# Patient Record
Sex: MALE | Race: WHITE | Employment: OTHER | URBAN - NONMETROPOLITAN AREA
[De-identification: names, ages, dates, MRNs, and addresses within clinical notes are randomized per-mention and may not be internally consistent; named-entity substitution may affect disease eponyms.]

---

## 2017-01-03 ENCOUNTER — TELEPHONE (OUTPATIENT)
Dept: NEUROLOGY | Age: 67
End: 2017-01-03

## 2017-01-05 ENCOUNTER — OFFICE VISIT (OUTPATIENT)
Dept: FAMILY MEDICINE CLINIC | Age: 67
End: 2017-01-05
Payer: COMMERCIAL

## 2017-01-05 VITALS
HEART RATE: 66 BPM | BODY MASS INDEX: 35.75 KG/M2 | OXYGEN SATURATION: 98 % | SYSTOLIC BLOOD PRESSURE: 134 MMHG | WEIGHT: 286 LBS | DIASTOLIC BLOOD PRESSURE: 80 MMHG | TEMPERATURE: 98.1 F

## 2017-01-05 DIAGNOSIS — G60.9 IDIOPATHIC PERIPHERAL NEUROPATHY: ICD-10-CM

## 2017-01-05 DIAGNOSIS — B35.3 TINEA PEDIS OF LEFT FOOT: ICD-10-CM

## 2017-01-05 DIAGNOSIS — M10.9 GOUT, UNSPECIFIED CAUSE, UNSPECIFIED CHRONICITY, UNSPECIFIED SITE: ICD-10-CM

## 2017-01-05 DIAGNOSIS — N40.0 BENIGN NODULAR PROSTATIC HYPERPLASIA WITHOUT LOWER URINARY TRACT SYMPTOMS: ICD-10-CM

## 2017-01-05 DIAGNOSIS — L97.512 CHRONIC ULCER OF RIGHT GREAT TOE, WITH FAT LAYER EXPOSED (HCC): ICD-10-CM

## 2017-01-05 DIAGNOSIS — L97.519: ICD-10-CM

## 2017-01-05 DIAGNOSIS — I73.9 PERIPHERAL VASCULAR DISEASE (HCC): ICD-10-CM

## 2017-01-05 DIAGNOSIS — B35.1 ONYCHOMYCOSIS: ICD-10-CM

## 2017-01-05 PROCEDURE — 99214 OFFICE O/P EST MOD 30 MIN: CPT | Performed by: FAMILY MEDICINE

## 2017-01-05 RX ORDER — MINOCYCLINE HYDROCHLORIDE 100 MG/1
100 TABLET ORAL 2 TIMES DAILY
Qty: 60 TABLET | Refills: 0 | Status: SHIPPED | OUTPATIENT
Start: 2017-01-05 | End: 2017-02-04

## 2017-01-05 ASSESSMENT — ENCOUNTER SYMPTOMS
WHEEZING: 0
SHORTNESS OF BREATH: 0
CONSTIPATION: 0
DIARRHEA: 0
ABDOMINAL PAIN: 0
BLOOD IN STOOL: 0
VOMITING: 0
NAUSEA: 0
CHEST TIGHTNESS: 0

## 2017-02-06 ENCOUNTER — OFFICE VISIT (OUTPATIENT)
Dept: FAMILY MEDICINE CLINIC | Age: 67
End: 2017-02-06
Payer: COMMERCIAL

## 2017-02-06 VITALS
TEMPERATURE: 98.6 F | RESPIRATION RATE: 20 BRPM | DIASTOLIC BLOOD PRESSURE: 86 MMHG | OXYGEN SATURATION: 98 % | SYSTOLIC BLOOD PRESSURE: 138 MMHG | WEIGHT: 288 LBS | HEART RATE: 72 BPM | HEIGHT: 75 IN | BODY MASS INDEX: 35.81 KG/M2

## 2017-02-06 DIAGNOSIS — M10.9 GOUT, UNSPECIFIED CAUSE, UNSPECIFIED CHRONICITY, UNSPECIFIED SITE: ICD-10-CM

## 2017-02-06 DIAGNOSIS — L97.512 CHRONIC ULCER OF RIGHT GREAT TOE, WITH FAT LAYER EXPOSED (HCC): ICD-10-CM

## 2017-02-06 DIAGNOSIS — B35.3 TINEA PEDIS OF LEFT FOOT: ICD-10-CM

## 2017-02-06 DIAGNOSIS — G60.9 IDIOPATHIC PERIPHERAL NEUROPATHY: ICD-10-CM

## 2017-02-06 DIAGNOSIS — G62.9 NEUROPATHY: ICD-10-CM

## 2017-02-06 DIAGNOSIS — I73.9 PERIPHERAL VASCULAR DISEASE (HCC): ICD-10-CM

## 2017-02-06 PROCEDURE — 99214 OFFICE O/P EST MOD 30 MIN: CPT | Performed by: FAMILY MEDICINE

## 2017-02-06 ASSESSMENT — ENCOUNTER SYMPTOMS
DIARRHEA: 0
CONSTIPATION: 0
EYES NEGATIVE: 1
ALLERGIC/IMMUNOLOGIC NEGATIVE: 1
VOMITING: 0
CHEST TIGHTNESS: 0
BLOOD IN STOOL: 0
NAUSEA: 0
SHORTNESS OF BREATH: 0
WHEEZING: 0

## 2017-03-30 ENCOUNTER — OFFICE VISIT (OUTPATIENT)
Dept: FAMILY MEDICINE CLINIC | Age: 67
End: 2017-03-30
Payer: COMMERCIAL

## 2017-03-30 VITALS
HEART RATE: 104 BPM | DIASTOLIC BLOOD PRESSURE: 74 MMHG | BODY MASS INDEX: 34.82 KG/M2 | RESPIRATION RATE: 16 BRPM | WEIGHT: 280 LBS | SYSTOLIC BLOOD PRESSURE: 130 MMHG | HEIGHT: 75 IN | TEMPERATURE: 98.1 F | OXYGEN SATURATION: 98 %

## 2017-03-30 DIAGNOSIS — M10.9 GOUT, UNSPECIFIED CAUSE, UNSPECIFIED CHRONICITY, UNSPECIFIED SITE: ICD-10-CM

## 2017-03-30 DIAGNOSIS — G60.9 IDIOPATHIC PERIPHERAL NEUROPATHY: ICD-10-CM

## 2017-03-30 DIAGNOSIS — B35.1 ONYCHOMYCOSIS: ICD-10-CM

## 2017-03-30 DIAGNOSIS — L03.031 CELLULITIS OF TOE OF RIGHT FOOT: ICD-10-CM

## 2017-03-30 DIAGNOSIS — G62.9 NEUROPATHY: ICD-10-CM

## 2017-03-30 DIAGNOSIS — I73.9 PERIPHERAL VASCULAR DISEASE (HCC): ICD-10-CM

## 2017-03-30 DIAGNOSIS — F41.9 ANXIETY: ICD-10-CM

## 2017-03-30 DIAGNOSIS — L97.512 CHRONIC ULCER OF RIGHT GREAT TOE, WITH FAT LAYER EXPOSED (HCC): ICD-10-CM

## 2017-03-30 DIAGNOSIS — E78.2 MIXED HYPERLIPIDEMIA: ICD-10-CM

## 2017-03-30 DIAGNOSIS — E29.1 ANDROGEN DEFICIENCY: ICD-10-CM

## 2017-03-30 PROCEDURE — 99214 OFFICE O/P EST MOD 30 MIN: CPT | Performed by: FAMILY MEDICINE

## 2017-03-30 PROCEDURE — 96372 THER/PROPH/DIAG INJ SC/IM: CPT | Performed by: FAMILY MEDICINE

## 2017-03-30 RX ORDER — DOXYCYCLINE HYCLATE 100 MG/1
100 CAPSULE ORAL 2 TIMES DAILY
Qty: 60 CAPSULE | Refills: 0 | Status: SHIPPED | OUTPATIENT
Start: 2017-03-30 | End: 2017-04-14

## 2017-03-30 RX ORDER — PENTOXIFYLLINE 400 MG/1
400 TABLET, EXTENDED RELEASE ORAL
Qty: 90 TABLET | Refills: 3 | Status: SHIPPED | OUTPATIENT
Start: 2017-03-30 | End: 2017-06-02 | Stop reason: SDUPTHER

## 2017-03-30 RX ORDER — SULFAMETHOXAZOLE AND TRIMETHOPRIM 800; 160 MG/1; MG/1
1 TABLET ORAL 2 TIMES DAILY
Qty: 60 TABLET | Refills: 0 | Status: SHIPPED | OUTPATIENT
Start: 2017-03-30 | End: 2017-04-14

## 2017-03-30 RX ORDER — CEFTRIAXONE 500 MG/1
1000 INJECTION, POWDER, FOR SOLUTION INTRAMUSCULAR; INTRAVENOUS ONCE
Status: COMPLETED | OUTPATIENT
Start: 2017-03-30 | End: 2017-03-30

## 2017-03-30 RX ADMIN — CEFTRIAXONE 1000 MG: 500 INJECTION, POWDER, FOR SOLUTION INTRAMUSCULAR; INTRAVENOUS at 11:33

## 2017-03-30 ASSESSMENT — ENCOUNTER SYMPTOMS
CHEST TIGHTNESS: 0
BLOOD IN STOOL: 0
DIARRHEA: 0
SHORTNESS OF BREATH: 0
NAUSEA: 0
CONSTIPATION: 0
VOMITING: 0
WHEEZING: 0

## 2017-04-17 DIAGNOSIS — F43.10 PTSD (POST-TRAUMATIC STRESS DISORDER): ICD-10-CM

## 2017-04-17 RX ORDER — DULOXETIN HYDROCHLORIDE 60 MG/1
60 CAPSULE, DELAYED RELEASE ORAL DAILY
Qty: 90 CAPSULE | Refills: 3 | Status: SHIPPED | OUTPATIENT
Start: 2017-04-17 | End: 2018-04-16 | Stop reason: SDUPTHER

## 2017-04-20 ENCOUNTER — OFFICE VISIT (OUTPATIENT)
Dept: FAMILY MEDICINE CLINIC | Age: 67
End: 2017-04-20
Payer: COMMERCIAL

## 2017-04-20 VITALS
HEIGHT: 75 IN | SYSTOLIC BLOOD PRESSURE: 122 MMHG | RESPIRATION RATE: 16 BRPM | BODY MASS INDEX: 34.32 KG/M2 | TEMPERATURE: 98.8 F | DIASTOLIC BLOOD PRESSURE: 80 MMHG | HEART RATE: 72 BPM | WEIGHT: 276 LBS | OXYGEN SATURATION: 98 %

## 2017-04-20 DIAGNOSIS — F41.9 ANXIETY: ICD-10-CM

## 2017-04-20 DIAGNOSIS — E78.2 MIXED HYPERLIPIDEMIA: ICD-10-CM

## 2017-04-20 DIAGNOSIS — I73.9 PERIPHERAL VASCULAR DISEASE (HCC): ICD-10-CM

## 2017-04-20 DIAGNOSIS — L03.031 CELLULITIS OF TOE OF RIGHT FOOT: ICD-10-CM

## 2017-04-20 DIAGNOSIS — G60.9 IDIOPATHIC PERIPHERAL NEUROPATHY: ICD-10-CM

## 2017-04-20 DIAGNOSIS — G62.9 NEUROPATHY: ICD-10-CM

## 2017-04-20 DIAGNOSIS — F32.89 OTHER DEPRESSION: ICD-10-CM

## 2017-04-20 DIAGNOSIS — B35.3 TINEA PEDIS OF LEFT FOOT: ICD-10-CM

## 2017-04-20 PROCEDURE — 99214 OFFICE O/P EST MOD 30 MIN: CPT | Performed by: FAMILY MEDICINE

## 2017-04-20 ASSESSMENT — ENCOUNTER SYMPTOMS
EYES NEGATIVE: 1
DIARRHEA: 0
BLOOD IN STOOL: 0
SHORTNESS OF BREATH: 0
VOMITING: 0
CONSTIPATION: 0
CHEST TIGHTNESS: 0
WHEEZING: 0
NAUSEA: 0
ALLERGIC/IMMUNOLOGIC NEGATIVE: 1

## 2017-06-02 RX ORDER — PENTOXIFYLLINE 400 MG/1
400 TABLET, EXTENDED RELEASE ORAL
Qty: 270 TABLET | Refills: 3 | Status: SHIPPED | OUTPATIENT
Start: 2017-06-02 | End: 2018-06-01 | Stop reason: SDUPTHER

## 2017-08-29 DIAGNOSIS — E34.9 TESTOSTERONE DEFICIENCY: ICD-10-CM

## 2017-08-31 RX ORDER — TESTOSTERONE CYPIONATE 200 MG/ML
INJECTION INTRAMUSCULAR
Qty: 10 ML | Refills: 0 | Status: SHIPPED | OUTPATIENT
Start: 2017-08-31 | End: 2019-02-21

## 2017-10-03 ENCOUNTER — OFFICE VISIT (OUTPATIENT)
Dept: FAMILY MEDICINE CLINIC | Age: 67
End: 2017-10-03
Payer: COMMERCIAL

## 2017-10-03 VITALS
WEIGHT: 275 LBS | SYSTOLIC BLOOD PRESSURE: 138 MMHG | HEART RATE: 87 BPM | OXYGEN SATURATION: 97 % | BODY MASS INDEX: 34.19 KG/M2 | DIASTOLIC BLOOD PRESSURE: 86 MMHG | HEIGHT: 75 IN | RESPIRATION RATE: 14 BRPM | TEMPERATURE: 99.1 F

## 2017-10-03 DIAGNOSIS — Z00.00 ANNUAL PHYSICAL EXAM: ICD-10-CM

## 2017-10-03 DIAGNOSIS — L97.519: ICD-10-CM

## 2017-10-03 DIAGNOSIS — I73.9 PERIPHERAL VASCULAR DISEASE (HCC): ICD-10-CM

## 2017-10-03 DIAGNOSIS — E34.9 TESTOSTERONE DEFICIENCY: ICD-10-CM

## 2017-10-03 DIAGNOSIS — E78.2 MIXED HYPERLIPIDEMIA: ICD-10-CM

## 2017-10-03 DIAGNOSIS — G60.9 IDIOPATHIC PERIPHERAL NEUROPATHY: ICD-10-CM

## 2017-10-03 DIAGNOSIS — Z97.4 HEARING AID WORN: ICD-10-CM

## 2017-10-03 LAB
ALBUMIN SERPL-MCNC: 4.4 G/DL (ref 3.5–5.2)
ALP BLD-CCNC: 63 U/L (ref 40–130)
ALT SERPL-CCNC: 22 U/L (ref 5–41)
ANION GAP SERPL CALCULATED.3IONS-SCNC: 12 MMOL/L (ref 7–19)
AST SERPL-CCNC: 16 U/L (ref 5–40)
BILIRUB SERPL-MCNC: 0.6 MG/DL (ref 0.2–1.2)
BILIRUBIN DIRECT: 0.1 MG/DL (ref 0–0.3)
BILIRUBIN, INDIRECT: 0.5 MG/DL (ref 0.1–1)
BUN BLDV-MCNC: 16 MG/DL (ref 8–23)
CALCIUM SERPL-MCNC: 9.4 MG/DL (ref 8.8–10.2)
CHLORIDE BLD-SCNC: 107 MMOL/L (ref 98–111)
CO2: 25 MMOL/L (ref 22–29)
CREAT SERPL-MCNC: 0.9 MG/DL (ref 0.5–1.2)
GFR NON-AFRICAN AMERICAN: >60
GLUCOSE BLD-MCNC: 115 MG/DL (ref 74–109)
HCT VFR BLD CALC: 40.9 % (ref 42–52)
HEMOGLOBIN: 14 G/DL (ref 14–18)
MCH RBC QN AUTO: 32.9 PG (ref 27–31)
MCHC RBC AUTO-ENTMCNC: 34.2 G/DL (ref 33–37)
MCV RBC AUTO: 96.2 FL (ref 80–94)
PDW BLD-RTO: 13.8 % (ref 11.5–14.5)
PLATELET # BLD: 181 K/UL (ref 130–400)
PMV BLD AUTO: 10.2 FL (ref 9.4–12.4)
POTASSIUM SERPL-SCNC: 4.4 MMOL/L (ref 3.5–5)
RBC # BLD: 4.25 M/UL (ref 4.7–6.1)
SODIUM BLD-SCNC: 144 MMOL/L (ref 136–145)
T4 FREE: 1 NG/DL (ref 0.9–1.7)
TOTAL PROTEIN: 7.3 G/DL (ref 6.6–8.7)
TSH SERPL DL<=0.05 MIU/L-ACNC: 2.06 UIU/ML (ref 0.27–4.2)
WBC # BLD: 7.3 K/UL (ref 4.8–10.8)

## 2017-10-03 PROCEDURE — 80305 DRUG TEST PRSMV DIR OPT OBS: CPT | Performed by: FAMILY MEDICINE

## 2017-10-03 PROCEDURE — G0439 PPPS, SUBSEQ VISIT: HCPCS | Performed by: FAMILY MEDICINE

## 2017-10-03 PROCEDURE — 90662 IIV NO PRSV INCREASED AG IM: CPT | Performed by: FAMILY MEDICINE

## 2017-10-03 PROCEDURE — 90471 IMMUNIZATION ADMIN: CPT | Performed by: FAMILY MEDICINE

## 2017-10-03 RX ORDER — GINKGO BILOBA 60 MG
TABLET ORAL
COMMUNITY
End: 2019-05-18

## 2017-10-03 ASSESSMENT — ENCOUNTER SYMPTOMS
EYES NEGATIVE: 1
VOMITING: 0
CHEST TIGHTNESS: 0
DIARRHEA: 0
SHORTNESS OF BREATH: 0
CONSTIPATION: 0
NAUSEA: 0
BLOOD IN STOOL: 0
ALLERGIC/IMMUNOLOGIC NEGATIVE: 1
WHEEZING: 0

## 2017-10-03 NOTE — PROGRESS NOTES
Subjective:      Patient ID: July Irbahim is a 69 y.o. male. Chief Complaint   Patient presents with    Annual Exam     papers from [facility]       HPIPatient is here today for his Annual Physical. Patient also has papers to be fill out for the [facility]. Patient is fasting today. 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 He is having a physical exam today and in addition we're filling out his [employer] for him. Currently, he is denying any new problems. He does have a history of testosterone deficiency and currently is on Depakote testosterone cypionate 200 mg/mL in utilizes 1 mL IM monthly. He has some depression issues and does take Cymbalta 60 mg by mouth daily which does remain helpful. When he has various arthritic issues, he does utilize ibuprofen 800 mg up to 3 times daily with food. Renzo did have a wound on his right foot that became quite infected in 2016 and required wound care treatment and being off of duty for a period of time. We did put him on nitrate L4 100 mg by mouth 3 times a day to improve circulation. The patient relates that the [place] was very helpful in promoting wound healing and also did help with some peripheral neuropathy that was developing as well. He states he now has no issues whatsoever. Past medical history: The patient does utilize a hearing aid in the left. He does have a history of having a left inguinal hernia repair over 20 years ago. He has had no problems since that time. The patient served his country in [facility]. He did contract malaria while doing his tour of duty there. He also sustained an injury to his left knee at which time he had shrapnel propelled into the leg. At the shrapnel remains in his lower extremity no other serious injuries or illnesses have been reported by the patient.     Social history: The patient quit smoking 25 years ago and has remained off of Abdominal: Soft. Normal appearance. Genitourinary: Rectum normal, prostate normal and penis normal. Rectal exam shows guaiac negative stool. No penile tenderness. Genitourinary Comments: Normal male genitalia visualized. No scrotal masses palpated no evidence of hernia on having the patient strained down. On visualization rectum was normal. Rectal sphincter tone is good. No rectal mucosal lesions are palpated on digital examination. Prostate is smooth and normal in consistency with no evidence of masses palpated. Hemoccult of stool sample was performed and this was negative. Musculoskeletal: Normal range of motion. Neurological: He is alert and oriented to person, place, and time. He has normal strength and normal reflexes. Skin: Skin is warm, dry and intact. Psychiatric: He has a normal mood and affect. His speech is normal and behavior is normal. Judgment and thought content normal. Cognition and memory are normal.   Nursing note and vitals reviewed. Assessment:      1. Annual physical exam  CBC    Basic Metabolic Panel    Lipoprotein NMR    Hepatic Function Panel    T4, Free    TSH without Reflex    Urinalysis    POCT Rapid Drug Screen   2. Peripheral vascular disease ([address]     3. Chronic ulcer of right great toe, with unspecified severity      Now healed, without issues. 4. Mixed hyperlipidemia      Fasting labs being obtained today. 5. Idiopathic peripheral neuropathy     6. Hearing aid worn      left   7. Testosterone deficiency             Plan:      I am having Mr. Cody Johnson maintain his :   Outpatient Prescriptions Marked as Taking for the 10/3/17 encounter (Office Visit) with Keren Bashir MD   Medication Sig Dispense Refill    Probiotic Product (PROBIOTIC DAILY PO) Take by mouth      Ginkgo 60 MG TABS Take by mouth     ,Patient states they are no longer utilizing these medication:   Medications Discontinued During This Encounter   Medication Reason    clotrimazole-betamethasone

## 2017-10-03 NOTE — MR AVS SNAPSHOT
After Visit Summary             Tremaine Queen   10/3/2017 7:00 AM   Office Visit    Description:  Male : 1950   Provider:  Myron Boyce MD   Department:  [address] and Future Appointments         Below is a list of your follow-up and future appointments. This may not be a complete list as you may have made appointments directly with providers that we are not aware of or your providers may have made some for you. Please call your providers to confirm appointments. It is important to keep your appointments. Please bring your current insurance card, photo ID, co-pay, and all medication bottles to your appointment. If self-pay, payment is expected at the time of service. Your To-Do List     Future Orders Complete By Expires    Basic Metabolic Panel [OTU61 Custom]  10/3/2017 10/3/2018    CBC [PHY619 Custom]  10/3/2017 10/3/2018    Hepatic Function Panel [LAB20 Custom]  10/3/2017 10/3/2018    Lipoprotein NMR [TFF9947 Custom]  10/3/2017 10/3/2018    T4, Free [JAK915 Custom]  10/3/2017 10/3/2018    TSH without Reflex [KGL473 Custom]  10/3/2017 10/3/2018    Urinalysis [QEU333 Custom]  10/3/2017 10/3/2018         Information from Your Visit        Department     Name Address Phone Fax    [address] [facility] [address] [address] [phone]      You Were Seen for:         Comments    Annual physical exam   [[id number]]         Vital Signs     Blood Pressure Pulse Temperature Respirations Height Weight    138/86 87 99.1 °F (37.3 °C) (Oral) 14 6' 3\" (1.905 m) 275 lb (124.7 kg)    Oxygen Saturation Body Mass Index Smoking Status             97% 34.37 kg/m2 Former Smoker         Additional Information about your Body Mass Index (BMI)           Your BMI as listed above is considered obese (30 or more). BMI is an estimate of body fat, calculated from your height and weight.   The higher your BMI, the greater your risk of heart disease, high blood pressure, type 2 diabetes, stroke, gallstones, arthritis, sleep apnea, and certain cancers. BMI is not perfect. It may overestimate body fat in athletes and people who are more muscular. Even a small weight loss (between 5 and 10 percent of your current weight) by decreasing your calorie intake and becoming more physically active will help lower your risk of developing or worsening diseases associated with obesity. Learn more at: [email or web address]             Today's Medication Changes          These changes are accurate as of: 10/3/17  8:14 AM.  If you have any questions, ask your nurse or doctor. STOP taking these medications           clotrimazole-betamethasone 1-0.05 % cream   Commonly known as:  LOTRISONE   Stopped by: Damon Gill MD               Your Current Medications Are              Probiotic Product (PROBIOTIC DAILY PO) Take by mouth    Ginkgo 60 MG TABS Take by mouth    testosterone cypionate (DEPOTESTOTERONE CYPIONATE) 200 MG/ML injection INJECT 1 ML EVERY MONTH    pentoxifylline (TRENTAL) 400 MG extended release tablet Take 1 tablet by mouth 3 times daily (with meals)    DULoxetine (CYMBALTA) 60 MG extended release capsule Take 1 capsule by mouth daily    Needles & Syringes MISC 1 each by Does not apply route every 30 days    aspirin 81 MG tablet Take 81 mg by mouth daily    Omega-3 Fatty Acids (FISH OIL PO) Take by mouth daily    Multiple Vitamins-Minerals (ONE DAILY MULTIVITAMIN MEN PO) Take by mouth daily    ibuprofen (ADVIL;MOTRIN) 800 MG tablet Take 800 mg by mouth every 6 hours as needed for Pain.       Allergies              Penicillins     Unknown what it does      We Ordered/Performed the following           INFLUENZA, HIGH DOSE, 65 YRS +, IM, PF, PREFILL SYR, 0.5ML (FLUZONE HD)     POCT Rapid Drug Screen          Additional Information        Basic Information Date Of Birth Sex Race Ethnicity Preferred Language Preferred Written Language    [birth year] Male White Non-/Non  English English      Problem List as of 10/3/2017  Date Reviewed: 10/3/2017                Idiopathic peripheral neuropathy    Benign nodular prostatic hyperplasia without lower urinary tract symptoms    Peripheral vascular disease (HCC)    Chronic ulcer of right great toe (HCC)    Tinea pedis of left foot    Neuropathy ([address]    Gout    Anxiety    Mixed hyperlipidemia      Immunizations as of 10/3/2017     Name Date    Influenza, High Dose 12/1/2015    Pneumococcal 13-valent Conjugate (Philippe Chopra) 12/1/2015      Preventive Care        Date Due    One-time abdominal aortic aneurism (AAA) screening is recommended for all men between the age of 73-68 who have ever smoked 1950    Hepatitis C screening is recommended for all adults regardless of risk factors born between [facility][1] at least once (lifetime) who have never been tested. 1950    Tetanus Combination Vaccine (1 - Tdap) 4/15/1967    Pneumococcal Vaccines (two) for all adults aged 72 and over (2 of 2 - PPSV23) 12/1/2016    Yearly Flu Vaccine (1) 9/1/2017    Colon Cancer Stool Test 12/28/2017    Diabetes Screening 12/23/2019    Cholesterol Screening 12/12/2021            [employer] Signup           Our records indicate that you have an active [employer] account. You can view your After Visit Summary by going to [email or web address] and logging in with your [employer] username and password. If you don't have a [employer] username and password but a parent or guardian has access to your record, the parent or guardian should login with their own [employer] username and password and access your record to view the After Visit Summary. Additional Information  If you have questions, please contact the physician practice where you receive care. Remember, [employer] is NOT to be used for urgent needs.  For

## 2017-10-03 NOTE — LETTER
disease. Overdose or dangerous interactions with alcohol and other medications may occur, leading to death. Hyperalgesia may develop, in which patients receiving opioids for the treatment of pain may actually become more sensitive to certain painful stimuli, and in some cases, experience pain from ordinarily non-painful stimuli. Women between the ages of 14-53 who could become pregnant should carefully weigh the risks and benefits of opioids with their physicians, as these medications increase the risk of pregnancy complications, including miscarriage,  delivery and stillbirth. It is also possible for babies to be born addicted to opioids. Opioid dependence withdrawal symptoms may include; feelings of uneasiness, increased pain, irritability, belly pain, diarrhea, sweats and goose-flesh. Benzodiazepines and non-benzodiazepine sleep medications: These medications can lead to problems such as addiction/dependence, sedation, fatigue, lightheadedness, dizziness, incoordination, falls, depression, hallucinations, and impaired judgment, memory and concentration. The ability to drive and operate machinery may also be affected. Abnormal sleep-related behaviors have been reported, including sleep walking, driving, making telephone calls, eating, or having sex while not fully awake. These medications can suppress breathing and worsen sleep apnea, particularly when combined with alcohol or other sedating medications, potentially leading to death. Dependence withdrawal symptoms may include tremors, anxiety, hallucinations and seizures. Stimulants:  Common adverse effects include addiction/dependence, increased blood pressure and heart rate, decreased appetite, nausea, involuntary weight loss, insomnia, irritability, and headaches.   These risks may increase when these medications are combined with other stimulants, such as caffeine pills or energy drinks, certain weight loss supplements and oral decongestants. Dependence withdrawal symptoms may include depressed mood, loss of interest, suicidal thoughts, anxiety, fatigue, appetite changes and agitation. Testosterone replacement therapy:  Potential side effects include increased risk of stroke and heart attack, blood clots, increased blood pressure, increased cholesterol, enlarged prostate, sleep apnea, irritability/aggression and other mood disorders, and decreased fertility. Other:     1. I understand that I have the following responsibilities:  · I will take medications at the dose and frequency prescribed. · I will not increase or change how I take my medications without the approval of the health care provider who signs this Medication Agreement. · I will arrange for refills at the prescribed interval ONLY during regular office hours. I will not ask for refills earlier than agreed, after-hours, on holidays or on weekends. · I will obtain all refills for these medications at  ·  ____________________________________  pharmacy (phone number  ·  ________________________), with full consent for my provider and pharmacist to exchange information in writing or verbally. · I will not request any pain medications or controlled substances from other providers and will inform this provider of all other medications I am taking. · I will inform my other health care providers that I am taking these medications and of the existence of this [id number]. In the event of an emergency, I will provide the same information to the emergency department providers. · I will protect my prescriptions and medications. I understand that lost or misplaced prescriptions will not be replaced. · I will keep medications only for my own use and will not share them with others. I will keep all medications away from children. · I agree to participate in any medical, psychological or psychiatric assessments recommended by my provider. · I will actively participate in any program designed to improve function, including social, physical, psychological and daily or work activities. 2. I will not use illegal or street drugs or another person's prescription. If I have an addiction problem with drugs or alcohol and my provider asks me to enter a program to address this issue, I agree to follow through. Such programs may include:  · 12-Step program and securing a sponsor  · Individual counseling   · Inpatient or outpatient treatment  · Other:_____________________________________________________________________________________________________________________________________________    If in treatment, I will request that a copy of the programs initial evaluation and treatment recommendations be sent to this provider and will not expect refills until that is received. I will also request written monthly updates be sent to this provider to verify my continuing treatment. 3. I will consent to drug screening upon my providers request to assure I am only taking the prescribed drugs, described in this MEDICATION AGREEMENT. I understand that a drug screen is a laboratory test in which a sample of my urine, blood or saliva is checked to see what drugs I have been taking. 4. I agree that I will treat the providers and staff at this office with respect at all times. I will keep all of my scheduled appointments, but if I need to cancel my appointment, I will do so a minimum of 24 hours before it is scheduled. 5. I understand that this provider may stop prescribing the medications listed if:  · I do not show any improvement in pain, or my activity has not improved. · I develop rapid tolerance or loss of improvement, as described in my treatment plan. · I develop significant side effects from the medication.   · My behavior is inconsistent with the responsibilities outlined above,

## 2017-10-09 ENCOUNTER — TELEPHONE (OUTPATIENT)
Dept: FAMILY MEDICINE CLINIC | Age: 67
End: 2017-10-09

## 2017-10-09 LAB
CHOLESTEROL, TOTAL: 191 MG/DL
EER LIPOPROFILE BY NMR: ABNORMAL
HDL PARTICLE NO, NMR: 30.2 UMOL/L
HDL SIZE: 8.3 NM
HDLC SERPL-MCNC: 39 MG/DL (ref 40–59)
LARGE HDL PARTICLE, NMR: <2.8 UMOL/L
LARGE VLDL PARTICLE, NMR: 5.2 NMOL/L
LDL CHOLESTEROL: 115 MG/DL
LDL PARTICLE NUMBER, NMR: 1687 NMOL/L
LDL PARTICLE SIZE: 20.3 NM
SMALL LDL PARTICLE, NMR: 949 NMOL/L
TRIGL SERPL-MCNC: 185 MG/DL (ref 30–149)
VLDL SIZE: 51 NM

## 2017-10-18 ENCOUNTER — TELEPHONE (OUTPATIENT)
Dept: FAMILY MEDICINE CLINIC | Age: 67
End: 2017-10-18

## 2018-04-16 DIAGNOSIS — F43.10 PTSD (POST-TRAUMATIC STRESS DISORDER): ICD-10-CM

## 2018-04-18 RX ORDER — DULOXETIN HYDROCHLORIDE 60 MG/1
60 CAPSULE, DELAYED RELEASE ORAL DAILY
Qty: 90 CAPSULE | Refills: 3 | Status: SHIPPED | OUTPATIENT
Start: 2018-04-18 | End: 2019-02-18 | Stop reason: SDUPTHER

## 2018-06-01 RX ORDER — PENTOXIFYLLINE 400 MG/1
400 TABLET, EXTENDED RELEASE ORAL
Qty: 270 TABLET | Refills: 1 | Status: SHIPPED | OUTPATIENT
Start: 2018-06-01 | End: 2019-08-05 | Stop reason: SDUPTHER

## 2019-01-16 RX ORDER — SILDENAFIL 100 MG/1
100 TABLET, FILM COATED ORAL PRN
Qty: 6 TABLET | Refills: 5 | Status: SHIPPED | OUTPATIENT
Start: 2019-01-16 | End: 2019-12-11

## 2019-02-18 DIAGNOSIS — F43.10 PTSD (POST-TRAUMATIC STRESS DISORDER): ICD-10-CM

## 2019-02-18 RX ORDER — DULOXETIN HYDROCHLORIDE 60 MG/1
60 CAPSULE, DELAYED RELEASE ORAL DAILY
Qty: 90 CAPSULE | Refills: 3 | Status: SHIPPED | OUTPATIENT
Start: 2019-02-18 | End: 2020-02-25

## 2019-02-21 ENCOUNTER — OFFICE VISIT (OUTPATIENT)
Dept: FAMILY MEDICINE CLINIC | Age: 69
End: 2019-02-21
Payer: COMMERCIAL

## 2019-02-21 ENCOUNTER — HOSPITAL ENCOUNTER (OUTPATIENT)
Dept: GENERAL RADIOLOGY | Age: 69
Discharge: HOME | End: 2019-02-21
Payer: COMMERCIAL

## 2019-02-21 ENCOUNTER — TELEPHONE (OUTPATIENT)
Dept: FAMILY MEDICINE CLINIC | Age: 69
End: 2019-02-21

## 2019-02-21 VITALS
TEMPERATURE: 98 F | DIASTOLIC BLOOD PRESSURE: 70 MMHG | WEIGHT: 263.6 LBS | HEART RATE: 85 BPM | SYSTOLIC BLOOD PRESSURE: 138 MMHG | HEIGHT: 75 IN | OXYGEN SATURATION: 98 % | RESPIRATION RATE: 16 BRPM | BODY MASS INDEX: 32.78 KG/M2

## 2019-02-21 DIAGNOSIS — I73.9 PERIPHERAL VASCULAR DISEASE (HCC): ICD-10-CM

## 2019-02-21 DIAGNOSIS — N40.0 BENIGN NODULAR PROSTATIC HYPERPLASIA WITHOUT LOWER URINARY TRACT SYMPTOMS: ICD-10-CM

## 2019-02-21 DIAGNOSIS — E34.9 TESTOSTERONE DEFICIENCY: ICD-10-CM

## 2019-02-21 DIAGNOSIS — E78.2 MIXED HYPERLIPIDEMIA: ICD-10-CM

## 2019-02-21 DIAGNOSIS — M25.511 ACUTE PAIN OF RIGHT SHOULDER: ICD-10-CM

## 2019-02-21 DIAGNOSIS — Z97.4 HEARING AID WORN: ICD-10-CM

## 2019-02-21 DIAGNOSIS — S42.009A CLOSED DISPLACED FRACTURE OF CLAVICLE, UNSPECIFIED LATERALITY, UNSPECIFIED PART OF CLAVICLE, INITIAL ENCOUNTER: Primary | ICD-10-CM

## 2019-02-21 DIAGNOSIS — G62.9 NEUROPATHY: ICD-10-CM

## 2019-02-21 LAB
ALBUMIN SERPL-MCNC: 4.4 G/DL (ref 3.5–5.2)
ALP BLD-CCNC: 68 U/L (ref 40–130)
ALT SERPL-CCNC: 17 U/L (ref 5–41)
ANION GAP SERPL CALCULATED.3IONS-SCNC: 14 MMOL/L (ref 7–19)
AST SERPL-CCNC: 16 U/L (ref 5–40)
BILIRUB SERPL-MCNC: 0.5 MG/DL (ref 0.2–1.2)
BILIRUBIN DIRECT: 0.1 MG/DL (ref 0–0.3)
BILIRUBIN URINE: NEGATIVE
BILIRUBIN, INDIRECT: 0.4 MG/DL (ref 0.1–1)
BLOOD, URINE: NEGATIVE
BUN BLDV-MCNC: 17 MG/DL (ref 8–23)
CALCIUM SERPL-MCNC: 8.9 MG/DL (ref 8.8–10.2)
CHLORIDE BLD-SCNC: 108 MMOL/L (ref 98–111)
CHOLESTEROL, TOTAL: 195 MG/DL (ref 160–199)
CLARITY: CLEAR
CO2: 23 MMOL/L (ref 22–29)
COLOR: YELLOW
CREAT SERPL-MCNC: 0.9 MG/DL (ref 0.5–1.2)
GFR NON-AFRICAN AMERICAN: >60
GLUCOSE BLD-MCNC: 102 MG/DL (ref 74–109)
GLUCOSE URINE: NEGATIVE MG/DL
HCT VFR BLD CALC: 41 % (ref 42–52)
HDLC SERPL-MCNC: 44 MG/DL (ref 55–121)
HEMOGLOBIN: 13.5 G/DL (ref 14–18)
KETONES, URINE: NEGATIVE MG/DL
LDL CHOLESTEROL CALCULATED: 121 MG/DL
LEUKOCYTE ESTERASE, URINE: NEGATIVE
MCH RBC QN AUTO: 31.8 PG (ref 27–31)
MCHC RBC AUTO-ENTMCNC: 32.9 G/DL (ref 33–37)
MCV RBC AUTO: 96.5 FL (ref 80–94)
NITRITE, URINE: NEGATIVE
PDW BLD-RTO: 13.4 % (ref 11.5–14.5)
PH UA: 6
PLATELET # BLD: 162 K/UL (ref 130–400)
PMV BLD AUTO: 10.6 FL (ref 9.4–12.4)
POTASSIUM SERPL-SCNC: 4.8 MMOL/L (ref 3.5–5)
PROTEIN UA: ABNORMAL MG/DL
RBC # BLD: 4.25 M/UL (ref 4.7–6.1)
SODIUM BLD-SCNC: 145 MMOL/L (ref 136–145)
SPECIFIC GRAVITY UA: 1.02
T4 FREE: 1 NG/DL (ref 0.9–1.7)
TOTAL PROTEIN: 7 G/DL (ref 6.6–8.7)
TRIGL SERPL-MCNC: 149 MG/DL (ref 0–149)
TSH SERPL DL<=0.05 MIU/L-ACNC: 2.62 UIU/ML (ref 0.27–4.2)
UROBILINOGEN, URINE: 0.2 E.U./DL
WBC # BLD: 5.5 K/UL (ref 4.8–10.8)

## 2019-02-21 PROCEDURE — 73030 X-RAY EXAM OF SHOULDER: CPT

## 2019-02-21 PROCEDURE — 99214 OFFICE O/P EST MOD 30 MIN: CPT | Performed by: FAMILY MEDICINE

## 2019-02-21 PROCEDURE — 96372 THER/PROPH/DIAG INJ SC/IM: CPT | Performed by: FAMILY MEDICINE

## 2019-02-21 RX ORDER — MELOXICAM 15 MG/1
15 TABLET ORAL DAILY
Qty: 30 TABLET | Refills: 3 | Status: SHIPPED | OUTPATIENT
Start: 2019-02-21 | End: 2019-05-14 | Stop reason: SDUPTHER

## 2019-02-21 RX ORDER — TRIAMCINOLONE ACETONIDE 40 MG/ML
80 INJECTION, SUSPENSION INTRA-ARTICULAR; INTRAMUSCULAR ONCE
Status: COMPLETED | OUTPATIENT
Start: 2019-02-21 | End: 2019-02-21

## 2019-02-21 RX ADMIN — TRIAMCINOLONE ACETONIDE 80 MG: 40 INJECTION, SUSPENSION INTRA-ARTICULAR; INTRAMUSCULAR at 08:37

## 2019-02-21 ASSESSMENT — PATIENT HEALTH QUESTIONNAIRE - PHQ9
2. FEELING DOWN, DEPRESSED OR HOPELESS: 0
SUM OF ALL RESPONSES TO PHQ9 QUESTIONS 1 & 2: 0
1. LITTLE INTEREST OR PLEASURE IN DOING THINGS: 0
SUM OF ALL RESPONSES TO PHQ QUESTIONS 1-9: 0
SUM OF ALL RESPONSES TO PHQ QUESTIONS 1-9: 0

## 2019-02-21 ASSESSMENT — ENCOUNTER SYMPTOMS
CHEST TIGHTNESS: 0
DIARRHEA: 0
SHORTNESS OF BREATH: 0
CONSTIPATION: 0
VOMITING: 0
NAUSEA: 0
WHEEZING: 0
BLOOD IN STOOL: 0
EYES NEGATIVE: 1
COUGH: 0

## 2019-02-24 LAB
PROSTATE SPECIFIC ANTIGEN FREE: 0.4 NG/ML
PROSTATE SPECIFIC ANTIGEN PERCENT FREE: 67 %
PROSTATE SPECIFIC ANTIGEN: 0.6 NG/ML (ref 0–4)

## 2019-02-27 LAB
SEX HORMONE BINDING GLOBULIN: 13 NMOL/L (ref 11–80)
TESTOSTERONE FREE-NONMALE: 28.9 PG/ML (ref 47–244)
TESTOSTERONE TOTAL: 102 NG/DL (ref 220–1000)

## 2019-03-01 ENCOUNTER — TELEPHONE (OUTPATIENT)
Dept: FAMILY MEDICINE CLINIC | Age: 69
End: 2019-03-01

## 2019-03-01 DIAGNOSIS — E34.9 TESTOSTERONE DEFICIENCY: Primary | ICD-10-CM

## 2019-03-01 RX ORDER — TESTOSTERONE CYPIONATE 200 MG/ML
200 INJECTION INTRAMUSCULAR
Qty: 10 ML | Refills: 0 | Status: ON HOLD | OUTPATIENT
Start: 2019-03-01 | End: 2019-05-21 | Stop reason: HOSPADM

## 2019-03-11 ENCOUNTER — TELEPHONE (OUTPATIENT)
Dept: FAMILY MEDICINE CLINIC | Age: 69
End: 2019-03-11

## 2019-05-14 DIAGNOSIS — M25.511 ACUTE PAIN OF RIGHT SHOULDER: ICD-10-CM

## 2019-05-14 RX ORDER — MELOXICAM 15 MG/1
TABLET ORAL
Qty: 30 TABLET | Refills: 2 | Status: SHIPPED | OUTPATIENT
Start: 2019-05-14 | End: 2019-05-16 | Stop reason: ALTCHOICE

## 2019-05-16 ENCOUNTER — OFFICE VISIT (OUTPATIENT)
Dept: FAMILY MEDICINE CLINIC | Age: 69
End: 2019-05-16
Payer: COMMERCIAL

## 2019-05-16 VITALS
SYSTOLIC BLOOD PRESSURE: 122 MMHG | WEIGHT: 273 LBS | DIASTOLIC BLOOD PRESSURE: 78 MMHG | TEMPERATURE: 98.7 F | BODY MASS INDEX: 34.12 KG/M2 | HEART RATE: 74 BPM | OXYGEN SATURATION: 99 %

## 2019-05-16 DIAGNOSIS — J34.89 POSTERIOR RHINORRHEA: ICD-10-CM

## 2019-05-16 DIAGNOSIS — J20.9 ACUTE BRONCHITIS, UNSPECIFIED ORGANISM: Primary | ICD-10-CM

## 2019-05-16 DIAGNOSIS — J98.01 ACUTE BRONCHOSPASM: ICD-10-CM

## 2019-05-16 PROCEDURE — 99213 OFFICE O/P EST LOW 20 MIN: CPT | Performed by: FAMILY MEDICINE

## 2019-05-16 RX ORDER — PREDNISONE 20 MG/1
TABLET ORAL
Qty: 14 TABLET | Refills: 0 | Status: SHIPPED | OUTPATIENT
Start: 2019-05-16 | End: 2019-05-28 | Stop reason: ALTCHOICE

## 2019-05-16 RX ORDER — AZITHROMYCIN 250 MG/1
250 TABLET, FILM COATED ORAL SEE ADMIN INSTRUCTIONS
Qty: 6 TABLET | Refills: 0 | Status: SHIPPED | OUTPATIENT
Start: 2019-05-16 | End: 2019-05-21

## 2019-05-16 RX ORDER — ALBUTEROL SULFATE 90 UG/1
2 AEROSOL, METERED RESPIRATORY (INHALATION) EVERY 6 HOURS PRN
Qty: 1 INHALER | Refills: 3 | Status: SHIPPED | OUTPATIENT
Start: 2019-05-16 | End: 2019-12-11

## 2019-05-16 RX ORDER — FLUTICASONE PROPIONATE 50 MCG
2 SPRAY, SUSPENSION (ML) NASAL DAILY
Qty: 1 BOTTLE | Refills: 2 | Status: SHIPPED | OUTPATIENT
Start: 2019-05-16 | End: 2019-05-28 | Stop reason: SDUPTHER

## 2019-05-16 ASSESSMENT — ENCOUNTER SYMPTOMS
RHINORRHEA: 1
EYE PAIN: 0
ABDOMINAL PAIN: 0
SORE THROAT: 0
EYE DISCHARGE: 0
CONSTIPATION: 0
SHORTNESS OF BREATH: 1
COUGH: 1
NAUSEA: 0
DIARRHEA: 0
VOMITING: 0

## 2019-05-16 NOTE — PROGRESS NOTES
Xi King is a 68 y.o. male who presents today for   Chief Complaint   Patient presents with    Cough     has had a couple months, still spitting up mucus, shortness of breath       Chief Complaint: Cough    HPI  Patient reports that he has been having a cough that has been ongoing for a couple months. Initally he was coughing worse and spitting up a lot of stuff but all that has gotten better but now is having some shortness of breath with activities and bending over. He does report that he breaths better sitting up but is continuing to lay down flat at night to sleep and denies any issues. He reports that shortness of breath episodes have been going on for the past couple weeks and have been associated with a cough. He has not been taking anything for his symptoms and denies any specific aggravating or alleviating factors for symptoms. He does report a sick contact in his wife who was recently diagnosed with strep throat but otherwise denies any other known sick contacts. He denies any known fever. Review of Systems   Constitutional: Negative for activity change, appetite change and fever. HENT: Positive for congestion and rhinorrhea. Negative for ear discharge, ear pain and sore throat. Eyes: Negative for pain and discharge. Respiratory: Positive for cough and shortness of breath. Cardiovascular: Negative for chest pain and palpitations. Gastrointestinal: Negative for abdominal pain, constipation, diarrhea, nausea and vomiting. Endocrine: Negative for cold intolerance and heat intolerance. Genitourinary: Negative for dysuria and hematuria. Musculoskeletal: Negative for gait problem and neck pain. Skin: Negative for rash and wound. Neurological: Negative for syncope, weakness and numbness. Hematological: Negative for adenopathy. Does not bruise/bleed easily. Psychiatric/Behavioral: Negative for dysphoric mood. The patient is not nervous/anxious.         Past Medical History: Diagnosis Date    Gout        Current Outpatient Medications   Medication Sig Dispense Refill    azithromycin (ZITHROMAX) 250 MG tablet Take 1 tablet by mouth See Admin Instructions for 5 days 500mg on day 1 followed by 250mg on days 2 - 5 6 tablet 0    albuterol sulfate HFA (PROVENTIL HFA) 108 (90 Base) MCG/ACT inhaler Inhale 2 puffs into the lungs every 6 hours as needed for Wheezing 1 Inhaler 3    predniSONE (DELTASONE) 20 MG tablet Take 2 tablets by mouth daily for 4 days, THEN 1 tablet daily for 4 days, THEN 0.5 tablets daily for 4 days. 14 tablet 0    fluticasone (FLONASE) 50 MCG/ACT nasal spray 2 sprays by Each Nare route daily 1 Spray in each nostril 1 Bottle 2    SYRINGE-NEEDLE, DISP, 3 ML 22G X 1-1/2\" 3 ML MISC 1 each by Does not apply route every 14 days For testosterone injections 100 each 3    testosterone cypionate (DEPOTESTOTERONE CYPIONATE) 200 MG/ML injection Inject 1 mL into the muscle every 14 days for 10 doses. . 10 mL 0    DULoxetine (CYMBALTA) 60 MG extended release capsule Take 1 capsule by mouth daily 90 capsule 3    sildenafil (VIAGRA) 100 MG tablet Take 1 tablet by mouth as needed for Erectile Dysfunction 6 tablet 5    pentoxifylline (TRENTAL) 400 MG extended release tablet TAKE 1 TABLET BY MOUTH 3 TIMES DAILY (WITH MEALS) 270 tablet 1    Ginkgo 60 MG TABS Take by mouth      Omega-3 Fatty Acids (FISH OIL PO) Take by mouth daily      Multiple Vitamins-Minerals (ONE DAILY MULTIVITAMIN MEN PO) Take by mouth daily      ibuprofen (ADVIL;MOTRIN) 800 MG tablet Take 800 mg by mouth every 6 hours as needed for Pain. No current facility-administered medications for this visit.            Allergies   Allergen Reactions    Penicillins      Unknown what it does       Past Surgical History:   Procedure Laterality Date    HERNIA REPAIR         Social History     Tobacco Use    Smoking status: Former Smoker     Types: Cigarettes     Last attempt to quit: 5/23/1980     Years since quittin.0    Smokeless tobacco: Never Used   Substance Use Topics    Alcohol use: No    Drug use: No       No family history on file. /78 (Site: Left Upper Arm, Position: Sitting, Cuff Size: Large Adult)   Pulse 74   Temp 98.7 °F (37.1 °C) (Oral)   Wt 273 lb (123.8 kg)   SpO2 99%   BMI 34.12 kg/m²     Physical Exam   Constitutional: He is oriented to person, place, and time. He appears well-developed and well-nourished. HENT:   Head: Normocephalic and atraumatic. Right Ear: Hearing, tympanic membrane, external ear and ear canal normal.   Left Ear: Hearing, tympanic membrane, external ear and ear canal normal.   Mouth/Throat: No oropharyngeal exudate. Posterior drainage   Eyes: Pupils are equal, round, and reactive to light. Conjunctivae are normal. Right eye exhibits no discharge. Left eye exhibits no discharge. No scleral icterus. Neck: Neck supple. No tracheal deviation present. No thyromegaly present. Cardiovascular: Normal rate, regular rhythm, normal heart sounds and intact distal pulses. Exam reveals no gallop and no friction rub. No murmur heard. Pulmonary/Chest: Effort normal. No respiratory distress. He has wheezes. He has no rales. Upper airway coarse breath sounds. Abdominal: Soft. Bowel sounds are normal. He exhibits no distension. There is no tenderness. Musculoskeletal: He exhibits no edema (bilateral lower extremities) or deformity ( no gross deformities of upper or lower extremities bilaterally). Lymphadenopathy:     He has no cervical adenopathy. Neurological: He is alert and oriented to person, place, and time. No cranial nerve deficit. He exhibits normal muscle tone. Skin: Skin is warm and dry. No rash noted. No erythema. Psychiatric: His behavior is normal. Thought content normal.   Nursing note and vitals reviewed. Assessment:       ICD-10-CM    1.  Acute bronchitis, unspecified organism J20.9 azithromycin (ZITHROMAX) 250 MG tablet predniSONE (DELTASONE) 20 MG tablet   2. Acute bronchospasm J98.01 albuterol sulfate HFA (PROVENTIL HFA) 108 (90 Base) MCG/ACT inhaler     predniSONE (DELTASONE) 20 MG tablet   3. Posterior rhinorrhea J34.89 fluticasone (FLONASE) 50 MCG/ACT nasal spray       Plan:  Discussed diagnosis, expected course, and proper use of medication, including OTC medications if prescription is too expensive or insurance does not cover. Discussed signs and symptoms requiring medical attention. All questions were answered and patient voiced understanding and agreement with plan as discussed. No orders of the defined types were placed in this encounter. Orders Placed This Encounter   Medications    azithromycin (ZITHROMAX) 250 MG tablet     Sig: Take 1 tablet by mouth See Admin Instructions for 5 days 500mg on day 1 followed by 250mg on days 2 - 5     Dispense:  6 tablet     Refill:  0    albuterol sulfate HFA (PROVENTIL HFA) 108 (90 Base) MCG/ACT inhaler     Sig: Inhale 2 puffs into the lungs every 6 hours as needed for Wheezing     Dispense:  1 Inhaler     Refill:  3    predniSONE (DELTASONE) 20 MG tablet     Sig: Take 2 tablets by mouth daily for 4 days, THEN 1 tablet daily for 4 days, THEN 0.5 tablets daily for 4 days. Dispense:  14 tablet     Refill:  0    fluticasone (FLONASE) 50 MCG/ACT nasal spray     Si sprays by Each Nare route daily 1 Spray in each nostril     Dispense:  1 Bottle     Refill:  2     Medications Discontinued During This Encounter   Medication Reason    aspirin 81 MG tablet Therapy completed    meloxicam (MOBIC) 15 MG tablet Therapy completed     Patient Instructions   Patient given educational handouts and has had all questions answered. Patient voices understanding and agrees to plans along with risks and benefits of plan. Patient is instructed to continue prior meds,diet, and exercise plans as instructed. Patient agrees to follow up as instructed and sooner if needed.   Patient agrees to go to ER if condition becomes emergent. Return if symptoms worsen or fail to improve, for next scheduled follow up with PCP.

## 2019-05-16 NOTE — PATIENT INSTRUCTIONS
Patient Education        Bronchitis: Care Instructions  Your Care Instructions    Bronchitis is inflammation of the bronchial tubes, which carry air to the lungs. The tubes swell and produce mucus, or phlegm. The mucus and inflamed bronchial tubes make you cough. You may have trouble breathing. Most cases of bronchitis are caused by viruses like those that cause colds. Antibiotics usually do not help and they may be harmful. Bronchitis usually develops rapidly and lasts about 2 to 3 weeks in otherwise healthy people. Follow-up care is a key part of your treatment and safety. Be sure to make and go to all appointments, and call your doctor if you are having problems. It's also a good idea to know your test results and keep a list of the medicines you take. How can you care for yourself at home? · Take all medicines exactly as prescribed. Call your doctor if you think you are having a problem with your medicine. · Get some extra rest.  · Take an over-the-counter pain medicine, such as acetaminophen (Tylenol), ibuprofen (Advil, Motrin), or naproxen (Aleve) to reduce fever and relieve body aches. Read and follow all instructions on the label. · Do not take two or more pain medicines at the same time unless the doctor told you to. Many pain medicines have acetaminophen, which is Tylenol. Too much acetaminophen (Tylenol) can be harmful. · Take an over-the-counter cough medicine that contains dextromethorphan to help quiet a dry, hacking cough so that you can sleep. Avoid cough medicines that have more than one active ingredient. Read and follow all instructions on the label. · Breathe moist air from a humidifier, hot shower, or sink filled with hot water. The heat and moisture will thin mucus so you can cough it out. · Do not smoke. Smoking can make bronchitis worse. If you need help quitting, talk to your doctor about stop-smoking programs and medicines.  These can increase your chances of quitting for good.  When should you call for help? Call 911 anytime you think you may need emergency care. For example, call if:    · You have severe trouble breathing.    Call your doctor now or seek immediate medical care if:    · You have new or worse trouble breathing.     · You cough up dark brown or bloody mucus (sputum).     · You have a new or higher fever.     · You have a new rash.    Watch closely for changes in your health, and be sure to contact your doctor if:    · You cough more deeply or more often, especially if you notice more mucus or a change in the color of your mucus.     · You are not getting better as expected. Where can you learn more? Go to https://Wenjuan.com.ThisClicks. org and sign in to your [employer] account. Enter H333 in the [place] box to learn more about \"Bronchitis: Care Instructions. \"     If you do not have an account, please click on the \"Sign Up Now\" link. Current as of: September 5, 2018  Content Version: 12.0  © 2215-5822 Healthwise, Incorporated. Care instructions adapted under license by [facility] ([facility]). If you have questions about a medical condition or this instruction, always ask your healthcare professional. [place] [address] any warranty or liability for your use of this information.

## 2019-05-18 ENCOUNTER — HOSPITAL ENCOUNTER (INPATIENT)
Age: 69
LOS: 3 days | Discharge: HOME | DRG: 246 | End: 2019-05-21
Attending: EMERGENCY MEDICINE | Admitting: INTERNAL MEDICINE
Payer: MEDICARE

## 2019-05-18 ENCOUNTER — APPOINTMENT (OUTPATIENT)
Dept: CT IMAGING | Age: 69
DRG: 246 | End: 2019-05-18
Payer: MEDICARE

## 2019-05-18 ENCOUNTER — OFFICE VISIT (OUTPATIENT)
Dept: URGENT CARE | Age: 69
End: 2019-05-18

## 2019-05-18 VITALS
DIASTOLIC BLOOD PRESSURE: 110 MMHG | OXYGEN SATURATION: 96 % | WEIGHT: 270 LBS | HEART RATE: 105 BPM | BODY MASS INDEX: 33.75 KG/M2 | SYSTOLIC BLOOD PRESSURE: 160 MMHG | TEMPERATURE: 96 F

## 2019-05-18 DIAGNOSIS — I50.9 NEW ONSET OF CONGESTIVE HEART FAILURE (HCC): Primary | ICD-10-CM

## 2019-05-18 DIAGNOSIS — R07.89 CHEST DISCOMFORT: ICD-10-CM

## 2019-05-18 DIAGNOSIS — R77.8 ELEVATED TROPONIN: ICD-10-CM

## 2019-05-18 DIAGNOSIS — R07.89 OTHER CHEST PAIN: Primary | ICD-10-CM

## 2019-05-18 LAB
ALBUMIN SERPL-MCNC: 4.4 G/DL (ref 3.5–5.2)
ALP BLD-CCNC: 75 U/L (ref 40–130)
ALT SERPL-CCNC: 28 U/L (ref 5–41)
ANION GAP SERPL CALCULATED.3IONS-SCNC: 14 MMOL/L (ref 7–19)
AST SERPL-CCNC: 20 U/L (ref 5–40)
BASE EXCESS ARTERIAL: -2 MMOL/L (ref -2–2)
BASOPHILS ABSOLUTE: 0 K/UL (ref 0–0.2)
BASOPHILS RELATIVE PERCENT: 0.3 % (ref 0–1)
BILIRUB SERPL-MCNC: 0.6 MG/DL (ref 0.2–1.2)
BUN BLDV-MCNC: 27 MG/DL (ref 8–23)
CALCIUM SERPL-MCNC: 10 MG/DL (ref 8.8–10.2)
CARBOXYHEMOGLOBIN ARTERIAL: 2 % (ref 0–5)
CHLORIDE BLD-SCNC: 99 MMOL/L (ref 98–111)
CO2: 25 MMOL/L (ref 22–29)
CREAT SERPL-MCNC: 1.1 MG/DL (ref 0.5–1.2)
EOSINOPHILS ABSOLUTE: 0 K/UL (ref 0–0.6)
EOSINOPHILS RELATIVE PERCENT: 0.1 % (ref 0–5)
GFR NON-AFRICAN AMERICAN: >60
GLUCOSE BLD-MCNC: 189 MG/DL (ref 74–109)
HCO3 ARTERIAL: 22.3 MMOL/L (ref 22–26)
HCT VFR BLD CALC: 46.2 % (ref 42–52)
HEMOGLOBIN, ART, EXTENDED: 15 G/DL (ref 14–18)
HEMOGLOBIN: 15.4 G/DL (ref 14–18)
LYMPHOCYTES ABSOLUTE: 0.6 K/UL (ref 1.1–4.5)
LYMPHOCYTES RELATIVE PERCENT: 4.2 % (ref 20–40)
MCH RBC QN AUTO: 33 PG (ref 27–31)
MCHC RBC AUTO-ENTMCNC: 33.3 G/DL (ref 33–37)
MCV RBC AUTO: 98.9 FL (ref 80–94)
METHEMOGLOBIN ARTERIAL: 0.9 %
MONOCYTES ABSOLUTE: 0.2 K/UL (ref 0–0.9)
MONOCYTES RELATIVE PERCENT: 1.6 % (ref 0–10)
NEUTROPHILS ABSOLUTE: 12.3 K/UL (ref 1.5–7.5)
NEUTROPHILS RELATIVE PERCENT: 93 % (ref 50–65)
O2 CONTENT ARTERIAL: 19.2 ML/DL
O2 SAT, ARTERIAL: 91 %
O2 THERAPY: ABNORMAL
PCO2 ARTERIAL: 36 MMHG (ref 35–45)
PDW BLD-RTO: 14.9 % (ref 11.5–14.5)
PH ARTERIAL: 7.4 (ref 7.35–7.45)
PLATELET # BLD: 368 K/UL (ref 130–400)
PMV BLD AUTO: 10.3 FL (ref 9.4–12.4)
PO2 ARTERIAL: 61 MMHG (ref 80–100)
POTASSIUM REFLEX MAGNESIUM: 4.5 MMOL/L (ref 3.5–5)
POTASSIUM, WHOLE BLOOD: 4.1
PRO-BNP: 4493 PG/ML (ref 0–900)
RBC # BLD: 4.67 M/UL (ref 4.7–6.1)
SODIUM BLD-SCNC: 138 MMOL/L (ref 136–145)
TOTAL PROTEIN: 8.1 G/DL (ref 6.6–8.7)
TROPONIN: 0.07 NG/ML (ref 0–0.03)
WBC # BLD: 13.2 K/UL (ref 4.8–10.8)

## 2019-05-18 PROCEDURE — 82803 BLOOD GASES ANY COMBINATION: CPT

## 2019-05-18 PROCEDURE — 6360000004 HC RX CONTRAST MEDICATION: Performed by: EMERGENCY MEDICINE

## 2019-05-18 PROCEDURE — 2700000000 HC OXYGEN THERAPY PER DAY

## 2019-05-18 PROCEDURE — 83880 ASSAY OF NATRIURETIC PEPTIDE: CPT

## 2019-05-18 PROCEDURE — 80053 COMPREHEN METABOLIC PANEL: CPT

## 2019-05-18 PROCEDURE — 2500000003 HC RX 250 WO HCPCS: Performed by: EMERGENCY MEDICINE

## 2019-05-18 PROCEDURE — 2140000000 HC CCU INTERMEDIATE R&B

## 2019-05-18 PROCEDURE — 99223 1ST HOSP IP/OBS HIGH 75: CPT | Performed by: INTERNAL MEDICINE

## 2019-05-18 PROCEDURE — 85025 COMPLETE CBC W/AUTO DIFF WBC: CPT

## 2019-05-18 PROCEDURE — 93005 ELECTROCARDIOGRAM TRACING: CPT

## 2019-05-18 PROCEDURE — 6360000002 HC RX W HCPCS: Performed by: INTERNAL MEDICINE

## 2019-05-18 PROCEDURE — 96375 TX/PRO/DX INJ NEW DRUG ADDON: CPT

## 2019-05-18 PROCEDURE — 84132 ASSAY OF SERUM POTASSIUM: CPT

## 2019-05-18 PROCEDURE — 99285 EMERGENCY DEPT VISIT HI MDM: CPT

## 2019-05-18 PROCEDURE — 99285 EMERGENCY DEPT VISIT HI MDM: CPT | Performed by: EMERGENCY MEDICINE

## 2019-05-18 PROCEDURE — 6370000000 HC RX 637 (ALT 250 FOR IP): Performed by: EMERGENCY MEDICINE

## 2019-05-18 PROCEDURE — 71275 CT ANGIOGRAPHY CHEST: CPT

## 2019-05-18 PROCEDURE — 36600 WITHDRAWAL OF ARTERIAL BLOOD: CPT

## 2019-05-18 PROCEDURE — 94640 AIRWAY INHALATION TREATMENT: CPT

## 2019-05-18 PROCEDURE — 36415 COLL VENOUS BLD VENIPUNCTURE: CPT

## 2019-05-18 PROCEDURE — 96374 THER/PROPH/DIAG INJ IV PUSH: CPT

## 2019-05-18 PROCEDURE — 6370000000 HC RX 637 (ALT 250 FOR IP): Performed by: INTERNAL MEDICINE

## 2019-05-18 PROCEDURE — 84484 ASSAY OF TROPONIN QUANT: CPT

## 2019-05-18 PROCEDURE — 6360000002 HC RX W HCPCS: Performed by: EMERGENCY MEDICINE

## 2019-05-18 RX ORDER — HYDRALAZINE HYDROCHLORIDE 20 MG/ML
20 INJECTION INTRAMUSCULAR; INTRAVENOUS ONCE
Status: DISCONTINUED | OUTPATIENT
Start: 2019-05-18 | End: 2019-05-18

## 2019-05-18 RX ORDER — METOPROLOL TARTRATE 5 MG/5ML
5 INJECTION INTRAVENOUS ONCE
Status: COMPLETED | OUTPATIENT
Start: 2019-05-18 | End: 2019-05-18

## 2019-05-18 RX ORDER — LISINOPRIL 10 MG/1
10 TABLET ORAL DAILY
Status: DISCONTINUED | OUTPATIENT
Start: 2019-05-18 | End: 2019-05-21

## 2019-05-18 RX ORDER — NITROGLYCERIN 0.4 MG/1
0.4 TABLET SUBLINGUAL EVERY 5 MIN PRN
Status: DISCONTINUED | OUTPATIENT
Start: 2019-05-18 | End: 2019-05-19 | Stop reason: SDUPTHER

## 2019-05-18 RX ORDER — NITROGLYCERIN 0.4 MG/1
0.4 TABLET SUBLINGUAL EVERY 5 MIN PRN
Status: DISCONTINUED | OUTPATIENT
Start: 2019-05-18 | End: 2019-05-18 | Stop reason: SDUPTHER

## 2019-05-18 RX ORDER — PANTOPRAZOLE SODIUM 40 MG/1
40 TABLET, DELAYED RELEASE ORAL
Status: DISPENSED | OUTPATIENT
Start: 2019-05-18 | End: 2019-05-21

## 2019-05-18 RX ORDER — CARVEDILOL 3.12 MG/1
3.12 TABLET ORAL 2 TIMES DAILY WITH MEALS
Status: DISCONTINUED | OUTPATIENT
Start: 2019-05-18 | End: 2019-05-19

## 2019-05-18 RX ORDER — FUROSEMIDE 10 MG/ML
80 INJECTION INTRAMUSCULAR; INTRAVENOUS ONCE
Status: COMPLETED | OUTPATIENT
Start: 2019-05-18 | End: 2019-05-18

## 2019-05-18 RX ORDER — ASPIRIN 81 MG/1
324 TABLET, CHEWABLE ORAL ONCE
Status: COMPLETED | OUTPATIENT
Start: 2019-05-18 | End: 2019-05-18

## 2019-05-18 RX ORDER — SODIUM CHLORIDE 0.9 % (FLUSH) 0.9 %
10 SYRINGE (ML) INJECTION EVERY 12 HOURS SCHEDULED
Status: DISCONTINUED | OUTPATIENT
Start: 2019-05-18 | End: 2019-05-19 | Stop reason: SDUPTHER

## 2019-05-18 RX ORDER — SODIUM CHLORIDE 0.9 % (FLUSH) 0.9 %
10 SYRINGE (ML) INJECTION PRN
Status: DISCONTINUED | OUTPATIENT
Start: 2019-05-18 | End: 2019-05-19 | Stop reason: SDUPTHER

## 2019-05-18 RX ORDER — ONDANSETRON 2 MG/ML
4 INJECTION INTRAMUSCULAR; INTRAVENOUS EVERY 6 HOURS PRN
Status: DISCONTINUED | OUTPATIENT
Start: 2019-05-18 | End: 2019-05-19 | Stop reason: SDUPTHER

## 2019-05-18 RX ORDER — IPRATROPIUM BROMIDE AND ALBUTEROL SULFATE 2.5; .5 MG/3ML; MG/3ML
1 SOLUTION RESPIRATORY (INHALATION) ONCE
Status: DISCONTINUED | OUTPATIENT
Start: 2019-05-18 | End: 2019-05-21 | Stop reason: HOSPADM

## 2019-05-18 RX ADMIN — ENOXAPARIN SODIUM 40 MG: 40 INJECTION SUBCUTANEOUS at 21:53

## 2019-05-18 RX ADMIN — LISINOPRIL 10 MG: 10 TABLET ORAL at 23:31

## 2019-05-18 RX ADMIN — NITROGLYCERIN 0.4 MG: 0.4 TABLET, ORALLY DISINTEGRATING SUBLINGUAL at 18:08

## 2019-05-18 RX ADMIN — FUROSEMIDE 80 MG: 10 INJECTION, SOLUTION INTRAMUSCULAR; INTRAVENOUS at 18:58

## 2019-05-18 RX ADMIN — METOPROLOL TARTRATE 5 MG: 5 INJECTION, SOLUTION INTRAVENOUS at 18:08

## 2019-05-18 RX ADMIN — CARVEDILOL 3.12 MG: 3.12 TABLET, FILM COATED ORAL at 23:31

## 2019-05-18 RX ADMIN — IOPAMIDOL 90 ML: 755 INJECTION, SOLUTION INTRAVENOUS at 18:36

## 2019-05-18 RX ADMIN — ASPIRIN 81 MG 324 MG: 81 TABLET ORAL at 18:08

## 2019-05-18 ASSESSMENT — ENCOUNTER SYMPTOMS
ABDOMINAL PAIN: 0
DOUBLE VISION: 0
STRIDOR: 0
EYE PAIN: 0
VOMITING: 0
SINUS PAIN: 0
BACK PAIN: 0
DIARRHEA: 0
COUGH: 1
SPUTUM PRODUCTION: 0
SORE THROAT: 0
BLURRED VISION: 0
ORTHOPNEA: 0
NAUSEA: 0
SHORTNESS OF BREATH: 1
PHOTOPHOBIA: 0

## 2019-05-18 ASSESSMENT — PAIN SCALES - GENERAL: PAINLEVEL_OUTOF10: 0

## 2019-05-18 NOTE — LETTER
[facility]  Cardiac Rehab Department  [address] [place] [address] [address]  [phone]  Toll Free [phone]              May 22, 2019    Dear Greyson Puri,    In follow-up to your recent hospitalization, please find this informational packet that has been sent to you on heart disease and the guidelines you are to follow concerning your present cardiac condition and immediate recovery. Due to your cardiac diagnosis and intervention you now qualify for participation in a Phase II Outpatient Cardiac Rehab Program.  This elective service has been shown to significantly reduce cardiac mortality by 26-31% among patients such as yourself. A brochure has been included in this mailing for the purpose of providing you with a brief overview of program components. Simply call [facility] ([phone]) or [facility] ([phone]) to inquire about program specifics and the opportunity to enroll. Feel free to reach out to us now or at any other time and our staff will be more than pleased to assist you. Thank you.     To the betterment of your health,        [facility] Cardiac Rehab Staff    Ambika Blunt, KIARA, MA  Exercise Physiologist

## 2019-05-18 NOTE — ED PROVIDER NOTES
[facility] CARE  eMERGENCY dEPARTMENT eNCOUnter      Pt Name: Juarez Khan  MRN: [id number]  [place] 1950  Date of evaluation: 5/18/2019  Provider: Kaycee Reeder MD    [address] [place] [address]       Chief Complaint   Patient presents with    Chest Pain     c/o chest pain and SOA since Wednesday          HISTORY OF PRESENT ILLNESS   (Location/Symptom, Timing/Onset,Context/Setting, Quality, Duration, Modifying Factors, Severity)  Note limiting factors. Juarez Khan is a 68 y.o. male who presents to the emergency department      The history is provided by the patient. Chest Pain   Pain location:  Substernal area  Pain quality: aching    Pain radiates to:  Does not radiate  Pain severity:  Moderate  Onset quality:  Sudden  Duration: 1300. Timing:  Constant  Progression:  Unchanged  Chronicity:  New  Context comment:  \"Reflux\" earlier this week, this pain different. Dyspnea x 5 days. Relieved by:  Nothing  Worsened by:  Nothing  Ineffective treatments:  None tried  Associated symptoms: cough and shortness of breath    Associated symptoms: no lower extremity edema, no nausea, no palpitations and no vomiting    Risk factors: male sex and obesity    Risk factors: no diabetes mellitus, no hypertension and no smoking        NursingNotes were reviewed. REVIEW OF SYSTEMS    (2-9 systems for level 4, 10 or more for level 5)     Review of Systems   Respiratory: Positive for cough and shortness of breath. Cardiovascular: Positive for chest pain. Negative for palpitations. Gastrointestinal: Negative for nausea and vomiting. All other systems reviewed and are negative. Except as noted above the remainder of the review of systems was reviewed and negative.        PAST MEDICAL HISTORY     Past Medical History:   Diagnosis Date    Gout          SURGICALHISTORY       Past Surgical History:   Procedure Laterality Date    HERNIA REPAIR           CURRENT MEDICATIONS       Current Discharge Medication List 05/18/19 1720 05/18/19 1717 05/18/19 1717   (!) 174/122 98.7 °F (37.1 °C) Oral 123 21 90 % 6' 3\" (1.905 m) 270 lb (122.5 kg)       Physical Exam   Constitutional: He is oriented to person, place, and time. No distress. Obese, sweaty (chronic for years)   HENT:   Mouth/Throat: Oropharynx is clear and moist.   Eyes: Conjunctivae are normal.   Neck: Normal range of motion. Neck supple. Cardiovascular: Regular rhythm and normal heart sounds. tachy   Pulmonary/Chest: Effort normal. He has wheezes (few). He has rales (bibasilar). He exhibits no tenderness. Abdominal: Soft. There is no tenderness. Musculoskeletal: He exhibits no edema. Neurological: He is alert and oriented to person, place, and time. No cranial nerve deficit. Skin: Skin is warm and dry. Psychiatric: He has a normal mood and affect. Nursing note and vitals reviewed. DIAGNOSTIC RESULTS     EKG: All EKG's are interpreted by the Emergency Department Physician who either signs or Co-signsthis chart in the absence of a cardiologist.    EKG: sinus tach, , RBBB/LAFB, no comparison available    RADIOLOGY:   Non-plain filmimages such as CT, Ultrasound and MRI are read by the radiologist. Plain radiographic images are visualized and preliminarily interpreted by the emergency physician with the below findings:        Interpretation per the Radiologist below, if available at the time ofthis note:    CTA PULMONARY W CONTRAST   Final Result   1. #1 there is no evidence of embolic disease. 2. Patchy airspace filling infiltrates are noted bilaterally small   pleural effusions although this could be pneumonia most likely this is   congestive failure and pulmonary edema.    Signed by Dr Jesus Stanley on 5/18/2019 7:04 PM      VL Lower Extremity Arterial Segmental Pressures W Ppg    (Results Pending)         ED BEDSIDE ULTRASOUND:   Performed by ED Physician - none    LABS:  Labs Reviewed   CBC WITH AUTO DIFFERENTIAL - Abnormal; Notable for the following components:       Result Value    WBC 13.2 (*)     RBC 4.67 (*)     MCV 98.9 (*)     MCH 33.0 (*)     RDW 14.9 (*)     Neutrophils % 93.0 (*)     Lymphocytes % 4.2 (*)     Neutrophils # 12.3 (*)     Lymphocytes # 0.6 (*)     All other components within normal limits   COMPREHENSIVE METABOLIC PANEL W/ REFLEX TO MG FOR LOW K - Abnormal; Notable for the following components:    Glucose 189 (*)     BUN 27 (*)     All other components within normal limits   TROPONIN - Abnormal; Notable for the following components:    Troponin 0.07 (*)     All other components within normal limits   BLOOD GAS, ARTERIAL - Abnormal; Notable for the following components:    pO2, Arterial 61.0 (*)     All other components within normal limits   BRAIN NATRIURETIC PEPTIDE - Abnormal; Notable for the following components:    Pro-BNP 4,493 (*)     All other components within normal limits   BASIC METABOLIC PANEL - Abnormal; Notable for the following components:    Glucose 129 (*)     BUN 31 (*)     GFR Non- 60 (*)     All other components within normal limits   LIPID PANEL - Abnormal; Notable for the following components:    Cholesterol, Total 203 (*)     Triglycerides 162 (*)     HDL 46 (*)     All other components within normal limits    Narrative:     CALL  [facility] tel. ,  Chemistry results called to and read back by Obinna Mendez RN ON 7TH, 05/19/2019 03:00, by [facility]  Chemistry results called to and read back by Obinna Mendez RN ON 7TH, 05/19/2019 03:00, by [facility]   TROPONIN - Abnormal; Notable for the following components:    Troponin 0.33 (*)     All other components within normal limits    Narrative:     [facility] tel. ,  Chemistry results called to and read back by ALEXIS ZALDIVAR RN ON 7TH, 05/19/2019 00:43, by [facility]   TROPONIN - Abnormal; Notable for the following components:    Troponin 0.40 (*)     All other components within normal limits    Narrative:     [facility] [address] tel. ,  Chemistry results called to and read back by Lakeshia Kat RN ON 7TH,  05/19/2019 03:00, by [facility]  Chemistry results called to and read back by Lakeshia Kat RN ON 7TH, 05/19/2019 03:00, by [facility]   CBC - Abnormal; Notable for the following components:    WBC 15.0 (*)     RBC 4.49 (*)     MCV 99.3 (*)     MCH 32.7 (*)     RDW 14.9 (*)     All other components within normal limits   RESPIRATORY CULTURE    Narrative:     ORDER#: [id number]                          ORDERED BY: BELINDA MOSES  SOURCE: Sputum Expectorated                COLLECTED:  05/18/19 23:40  ANTIBIOTICS AT [facility].:                      RECEIVED :  05/19/19 00:04   POTASSIUM, WHOLE BLOOD   MAGNESIUM   HEMOGLOBIN A1C   TSH WITHOUT REFLEX   APTT   APTT   APTT       All other labs were within normal range or not returned as of this dictation. EMERGENCY DEPARTMENT COURSE and DIFFERENTIAL DIAGNOSIS/MDM:   Vitals:    Vitals:    05/19/19 0226 05/19/19 0422 05/19/19 0845 05/19/19 1023   BP: (!) 144/98  124/73 (!) 128/96   Pulse: 101  94 93   Resp: 18   16   Temp: 97.8 °F (36.6 °C)   97.6 °F (36.4 °C)   TempSrc: Temporal   Temporal   SpO2: 97%   96%   Weight:  268 lb 12.8 oz (121.9 kg)     Height:               MDM  Number of Diagnoses or Management Options  Chest discomfort:   Elevated troponin:   New onset of congestive heart failure [facility]):   Diagnosis management comments: Feels much better at admission. Discussed with Dr. Sharda Vale - admit. CRITICAL CARE TIME   Total Critical Care time was 20 minutes, excluding separately reportable procedures. There was a high probability of clinically significant/lifethreatening deterioration in the patient's condition which required my urgent intervention. CONSULTS:  IP CONSULT TO DIETITIAN  IP CONSULT TO CARDIOLOGY    PROCEDURES:  Unless otherwise noted below, none     Procedures    FINAL IMPRESSION      1. New onset of congestive heart failure (HCC)    2. Chest discomfort    3.  Elevated troponin DISPOSITION/PLAN   DISPOSITION        PATIENT REFERRED TO:  Jihan Mishra MD  [address] [place] [address]  [phone]            DISCHARGE MEDICATIONS:  Current Discharge Medication List             (Please note that portions of this note were completed with a voice recognition program.  Efforts were made to edit the dictations but occasionally words are mis-transcribed.)    Allan Muniz MD (electronically signed)  Attending Emergency Physician          Allan Muniz MD  05/19/19 5976

## 2019-05-18 NOTE — PROGRESS NOTES
[address]  [address] [place] [address]  Dept: [phone]  Loc: [phone]      Chey Queen is c/o of Shortness of Breath and Gastroesophageal Reflux        HPI:     Pt entered triage with SOA, burning chest discomfort. He was started on a high dose of Prednisone on Wednesday per his wife by Dr Vane Jacobson his PCP. I spoke with pt and wife and explained to them with the sx he was having and the elevated bp he needed to be seen in [facility]. They were in agreement but declined ambulance. Wife is driving him there. I advised ER of his sx. Patient presents with Shortness of Breath and Gastroesophageal Reflux      Past Medical History:   Diagnosis Date    Gout       Past Surgical History:   Procedure Laterality Date    HERNIA REPAIR         No family history on file. Social History     Tobacco Use    Smoking status: Former Smoker     Types: Cigarettes     Last attempt to quit: 1980     Years since quittin.0    Smokeless tobacco: Never Used   Substance Use Topics    Alcohol use: No      Current Outpatient Medications   Medication Sig Dispense Refill    azithromycin (ZITHROMAX) 250 MG tablet Take 1 tablet by mouth See Admin Instructions for 5 days 500mg on day 1 followed by 250mg on days 2 - 5 6 tablet 0    albuterol sulfate HFA (PROVENTIL HFA) 108 (90 Base) MCG/ACT inhaler Inhale 2 puffs into the lungs every 6 hours as needed for Wheezing 1 Inhaler 3    predniSONE (DELTASONE) 20 MG tablet Take 2 tablets by mouth daily for 4 days, THEN 1 tablet daily for 4 days, THEN 0.5 tablets daily for 4 days.  14 tablet 0    fluticasone (FLONASE) 50 MCG/ACT nasal spray 2 sprays by Each Nare route daily 1 Spray in each nostril 1 Bottle 2    SYRINGE-NEEDLE, DISP, 3 ML 22G X 1-1/2\" 3 ML MISC 1 each by Does not apply route every 14 days For testosterone injections 100 each 3    testosterone cypionate (DEPOTESTOTERONE CYPIONATE) 200 MG/ML injection Inject 1 mL into the muscle every 14 days for 10 doses. . 10 mL 0    DULoxetine (CYMBALTA) 60 MG extended release capsule Take 1 capsule by mouth daily 90 capsule 3    sildenafil (VIAGRA) 100 MG tablet Take 1 tablet by mouth as needed for Erectile Dysfunction 6 tablet 5    pentoxifylline (TRENTAL) 400 MG extended release tablet TAKE 1 TABLET BY MOUTH 3 TIMES DAILY (WITH MEALS) 270 tablet 1    Ginkgo 60 MG TABS Take by mouth      Omega-3 Fatty Acids (FISH OIL PO) Take by mouth daily      Multiple Vitamins-Minerals (ONE DAILY MULTIVITAMIN MEN PO) Take by mouth daily      ibuprofen (ADVIL;MOTRIN) 800 MG tablet Take 800 mg by mouth every 6 hours as needed for Pain. No current facility-administered medications for this visit. Allergies   Allergen Reactions    Penicillins      Unknown what it does       Health Maintenance   Topic Date Due    AAA screen  1950    Hepatitis C screen  1950    Shingles Vaccine (1 of 2) 04/15/1998    Pneumococcal 65+ years Vaccine (2 of 2 - PPSV23) 12/01/2016    Colon Cancer Screen FIT/FOBT  12/28/2017    Flu vaccine (Season Ended) 09/01/2019    Lipid screen  02/21/2024    DTaP/Tdap/Td vaccine (2 - Td) 12/15/2024       Subjective:      Review of Systems    Objective:     Physical Exam  BP (!) 160/110   Pulse 105   Temp 96 °F (35.6 °C) (Oral)   Wt 270 lb (122.5 kg)   SpO2 96%   BMI 33.75 kg/m²     Assessment/ Plan       Diagnosis Orders   1. Other chest pain         No orders of the defined types were placed in this encounter. Patient given educational materials - see patient instructions. Discussed use, benefit, and side effects of prescribed medications. All patient questions answered. Pt voiced understanding. Patient agreedwith treatment plan. Follow up as needed    There are no Patient Instructions on file for this visit.       Electronically signed by CHRIS Khan CNP on 5/18/2019 at 5:12 PM

## 2019-05-18 NOTE — PROGRESS NOTES
5/18/2019  5:55 PM - Larisa Wood Incoming Lab Results From [employer] Value Ref Range & Units Status Collected Lab   pH, Arterial 7.400  7.350 - 7.450 Final 05/18/2019  5:54 PM [facility] Lab   pCO2, Arterial 36.0  35.0 - 45.0 mmHg Final 05/18/2019  5:54 PM [facility] Lab   pO2, Arterial 61. 0Low   80.0 - 100.0 mmHg Final 05/18/2019  5:54 PM [address] [place] [address] Lab   HCO3, Arterial 22.3  22.0 - 26.0 mmol/L Final 05/18/2019  5:54 PM [facility] Excess, Arterial -2.0  -2.0 - 2.0 mmol/L Final 05/18/2019  5:54 PM [address] [place] [address] Lab   Hemoglobin, Art, Extended 15.0  14.0 - 18.0 g/dL Final 05/18/2019  5:54 PM [address] [place] [address] Lab   O2 Sat, Arterial 91.0  >92 % Final 05/18/2019  5:54 PM [facility] Lab   Carboxyhgb, Arterial 2.0  0.0 - 5.0 % Final 05/18/2019  5:54 PM [facility] Lab        0.0-1.5   (Smokers 1.5-5.0)    Methemoglobin, Arterial 0.9  <1.5 % Final 05/18/2019  5:54 PM [address] [place] [address] Lab   O2 Content, Arterial 19.2  Not Established mL/dL Final 05/18/2019  5:54 PM [address] [place] [address] Lab     Pt on 2 lpm nasal cannula  resp rate 20  Right radial puncture AT+

## 2019-05-19 PROBLEM — R77.8 ELEVATED TROPONIN: Status: ACTIVE | Noted: 2019-05-19

## 2019-05-19 PROBLEM — R79.89 ELEVATED TROPONIN: Status: ACTIVE | Noted: 2019-05-19

## 2019-05-19 LAB
ALBUMIN SERPL-MCNC: 3.8 G/DL (ref 3.5–5.2)
ALP BLD-CCNC: 68 U/L (ref 40–130)
ALT SERPL-CCNC: 50 U/L (ref 5–41)
ANION GAP SERPL CALCULATED.3IONS-SCNC: 10 MMOL/L (ref 7–19)
ANION GAP SERPL CALCULATED.3IONS-SCNC: 16 MMOL/L (ref 7–19)
APTT: 26.7 SEC (ref 26–36.2)
APTT: 31.7 SEC (ref 26–36.2)
APTT: 36.2 SEC (ref 26–36.2)
AST SERPL-CCNC: 99 U/L (ref 5–40)
BILIRUB SERPL-MCNC: 0.9 MG/DL (ref 0.2–1.2)
BUN BLDV-MCNC: 31 MG/DL (ref 8–23)
BUN BLDV-MCNC: 32 MG/DL (ref 8–23)
CALCIUM SERPL-MCNC: 10 MG/DL (ref 8.8–10.2)
CALCIUM SERPL-MCNC: 9.3 MG/DL (ref 8.8–10.2)
CHLORIDE BLD-SCNC: 98 MMOL/L (ref 98–111)
CHLORIDE BLD-SCNC: 99 MMOL/L (ref 98–111)
CHOLESTEROL, TOTAL: 165 MG/DL (ref 160–199)
CHOLESTEROL, TOTAL: 203 MG/DL (ref 160–199)
CO2: 24 MMOL/L (ref 22–29)
CO2: 29 MMOL/L (ref 22–29)
CREAT SERPL-MCNC: 1.2 MG/DL (ref 0.5–1.2)
CREAT SERPL-MCNC: 1.3 MG/DL (ref 0.5–1.2)
EKG P AXIS: 64 DEGREES
EKG P-R INTERVAL: 202 MS
EKG Q-T INTERVAL: 330 MS
EKG QRS DURATION: 152 MS
EKG QTC CALCULATION (BAZETT): 433 MS
EKG T AXIS: 71 DEGREES
GFR NON-AFRICAN AMERICAN: 54
GFR NON-AFRICAN AMERICAN: 60
GLUCOSE BLD-MCNC: 121 MG/DL (ref 74–109)
GLUCOSE BLD-MCNC: 129 MG/DL (ref 74–109)
HBA1C MFR BLD: 5 % (ref 4–6)
HCT VFR BLD CALC: 42 % (ref 42–52)
HCT VFR BLD CALC: 44.6 % (ref 42–52)
HDLC SERPL-MCNC: 42 MG/DL (ref 55–121)
HDLC SERPL-MCNC: 46 MG/DL (ref 55–121)
HEMOGLOBIN: 13.8 G/DL (ref 14–18)
HEMOGLOBIN: 14.7 G/DL (ref 14–18)
LDL CHOLESTEROL CALCULATED: 125 MG/DL
LDL CHOLESTEROL CALCULATED: 86 MG/DL
LV EF: 33 %
LVEF MODALITY: NORMAL
MAGNESIUM: 2.4 MG/DL (ref 1.6–2.4)
MCH RBC QN AUTO: 32.6 PG (ref 27–31)
MCH RBC QN AUTO: 32.7 PG (ref 27–31)
MCHC RBC AUTO-ENTMCNC: 32.9 G/DL (ref 33–37)
MCHC RBC AUTO-ENTMCNC: 33 G/DL (ref 33–37)
MCV RBC AUTO: 99.3 FL (ref 80–94)
MCV RBC AUTO: 99.3 FL (ref 80–94)
PDW BLD-RTO: 14.9 % (ref 11.5–14.5)
PDW BLD-RTO: 15.1 % (ref 11.5–14.5)
PLATELET # BLD: 292 K/UL (ref 130–400)
PLATELET # BLD: 311 K/UL (ref 130–400)
PMV BLD AUTO: 9.8 FL (ref 9.4–12.4)
PMV BLD AUTO: 9.9 FL (ref 9.4–12.4)
POTASSIUM SERPL-SCNC: 4.4 MMOL/L (ref 3.5–5)
POTASSIUM SERPL-SCNC: 4.5 MMOL/L (ref 3.5–5)
RBC # BLD: 4.23 M/UL (ref 4.7–6.1)
RBC # BLD: 4.49 M/UL (ref 4.7–6.1)
SODIUM BLD-SCNC: 137 MMOL/L (ref 136–145)
SODIUM BLD-SCNC: 139 MMOL/L (ref 136–145)
TOTAL PROTEIN: 7 G/DL (ref 6.6–8.7)
TRIGL SERPL-MCNC: 162 MG/DL (ref 0–149)
TRIGL SERPL-MCNC: 186 MG/DL (ref 0–149)
TROPONIN: 0.33 NG/ML (ref 0–0.03)
TROPONIN: 0.4 NG/ML (ref 0–0.03)
TSH SERPL DL<=0.05 MIU/L-ACNC: 2.3 UIU/ML (ref 0.27–4.2)
WBC # BLD: 11.8 K/UL (ref 4.8–10.8)
WBC # BLD: 15 K/UL (ref 4.8–10.8)

## 2019-05-19 PROCEDURE — 99232 SBSQ HOSP IP/OBS MODERATE 35: CPT | Performed by: INTERNAL MEDICINE

## 2019-05-19 PROCEDURE — 2140000000 HC CCU INTERMEDIATE R&B

## 2019-05-19 PROCEDURE — 99221 1ST HOSP IP/OBS SF/LOW 40: CPT | Performed by: INTERNAL MEDICINE

## 2019-05-19 PROCEDURE — 87077 CULTURE AEROBIC IDENTIFY: CPT

## 2019-05-19 PROCEDURE — 6370000000 HC RX 637 (ALT 250 FOR IP): Performed by: INTERNAL MEDICINE

## 2019-05-19 PROCEDURE — 93923 UPR/LXTR ART STDY 3+ LVLS: CPT

## 2019-05-19 PROCEDURE — 36415 COLL VENOUS BLD VENIPUNCTURE: CPT

## 2019-05-19 PROCEDURE — 83036 HEMOGLOBIN GLYCOSYLATED A1C: CPT

## 2019-05-19 PROCEDURE — 85730 THROMBOPLASTIN TIME PARTIAL: CPT

## 2019-05-19 PROCEDURE — 84443 ASSAY THYROID STIM HORMONE: CPT

## 2019-05-19 PROCEDURE — 85027 COMPLETE CBC AUTOMATED: CPT

## 2019-05-19 PROCEDURE — 84484 ASSAY OF TROPONIN QUANT: CPT

## 2019-05-19 PROCEDURE — 87070 CULTURE OTHR SPECIMN AEROBIC: CPT

## 2019-05-19 PROCEDURE — 80061 LIPID PANEL: CPT

## 2019-05-19 PROCEDURE — 80053 COMPREHEN METABOLIC PANEL: CPT

## 2019-05-19 PROCEDURE — 83735 ASSAY OF MAGNESIUM: CPT

## 2019-05-19 PROCEDURE — 93306 TTE W/DOPPLER COMPLETE: CPT

## 2019-05-19 PROCEDURE — 2580000003 HC RX 258: Performed by: INTERNAL MEDICINE

## 2019-05-19 PROCEDURE — 6360000002 HC RX W HCPCS: Performed by: INTERNAL MEDICINE

## 2019-05-19 PROCEDURE — 87205 SMEAR GRAM STAIN: CPT

## 2019-05-19 PROCEDURE — 2700000000 HC OXYGEN THERAPY PER DAY

## 2019-05-19 PROCEDURE — 87186 SC STD MICRODIL/AGAR DIL: CPT

## 2019-05-19 PROCEDURE — 80048 BASIC METABOLIC PNL TOTAL CA: CPT

## 2019-05-19 RX ORDER — ATORVASTATIN CALCIUM 40 MG/1
40 TABLET, FILM COATED ORAL NIGHTLY
Status: DISCONTINUED | OUTPATIENT
Start: 2019-05-19 | End: 2019-05-21 | Stop reason: HOSPADM

## 2019-05-19 RX ORDER — SODIUM CHLORIDE 9 MG/ML
INJECTION, SOLUTION INTRAVENOUS CONTINUOUS
Status: DISCONTINUED | OUTPATIENT
Start: 2019-05-19 | End: 2019-05-20 | Stop reason: SDUPTHER

## 2019-05-19 RX ORDER — HEPARIN SODIUM 1000 [USP'U]/ML
4000 INJECTION, SOLUTION INTRAVENOUS; SUBCUTANEOUS ONCE
Status: COMPLETED | OUTPATIENT
Start: 2019-05-19 | End: 2019-05-19

## 2019-05-19 RX ORDER — FLUTICASONE PROPIONATE 50 MCG
2 SPRAY, SUSPENSION (ML) NASAL DAILY
Status: DISCONTINUED | OUTPATIENT
Start: 2019-05-19 | End: 2019-05-21 | Stop reason: HOSPADM

## 2019-05-19 RX ORDER — ISOSORBIDE MONONITRATE 30 MG/1
30 TABLET, EXTENDED RELEASE ORAL DAILY
Status: DISCONTINUED | OUTPATIENT
Start: 2019-05-19 | End: 2019-05-21 | Stop reason: HOSPADM

## 2019-05-19 RX ORDER — NITROGLYCERIN 0.4 MG/1
0.4 TABLET SUBLINGUAL EVERY 5 MIN PRN
Status: DISCONTINUED | OUTPATIENT
Start: 2019-05-19 | End: 2019-05-21 | Stop reason: HOSPADM

## 2019-05-19 RX ORDER — CARVEDILOL 6.25 MG/1
6.25 TABLET ORAL 2 TIMES DAILY WITH MEALS
Status: DISCONTINUED | OUTPATIENT
Start: 2019-05-19 | End: 2019-05-21 | Stop reason: HOSPADM

## 2019-05-19 RX ORDER — PENTOXIFYLLINE 400 MG/1
400 TABLET, EXTENDED RELEASE ORAL
Status: DISCONTINUED | OUTPATIENT
Start: 2019-05-19 | End: 2019-05-21 | Stop reason: HOSPADM

## 2019-05-19 RX ORDER — ALBUTEROL SULFATE 90 UG/1
2 AEROSOL, METERED RESPIRATORY (INHALATION) EVERY 6 HOURS PRN
Status: DISCONTINUED | OUTPATIENT
Start: 2019-05-19 | End: 2019-05-21 | Stop reason: HOSPADM

## 2019-05-19 RX ORDER — SODIUM CHLORIDE 0.9 % (FLUSH) 0.9 %
10 SYRINGE (ML) INJECTION EVERY 12 HOURS SCHEDULED
Status: DISCONTINUED | OUTPATIENT
Start: 2019-05-19 | End: 2019-05-20 | Stop reason: SDUPTHER

## 2019-05-19 RX ORDER — HEPARIN SODIUM 1000 [USP'U]/ML
2000 INJECTION, SOLUTION INTRAVENOUS; SUBCUTANEOUS PRN
Status: DISCONTINUED | OUTPATIENT
Start: 2019-05-19 | End: 2019-05-21

## 2019-05-19 RX ORDER — SODIUM CHLORIDE 0.9 % (FLUSH) 0.9 %
10 SYRINGE (ML) INJECTION PRN
Status: DISCONTINUED | OUTPATIENT
Start: 2019-05-19 | End: 2019-05-20 | Stop reason: SDUPTHER

## 2019-05-19 RX ORDER — FUROSEMIDE 40 MG/1
40 TABLET ORAL 2 TIMES DAILY
Status: DISCONTINUED | OUTPATIENT
Start: 2019-05-19 | End: 2019-05-21 | Stop reason: HOSPADM

## 2019-05-19 RX ORDER — HEPARIN SODIUM 1000 [USP'U]/ML
4000 INJECTION, SOLUTION INTRAVENOUS; SUBCUTANEOUS PRN
Status: DISCONTINUED | OUTPATIENT
Start: 2019-05-19 | End: 2019-05-21

## 2019-05-19 RX ORDER — HEPARIN SODIUM 10000 [USP'U]/100ML
8.2 INJECTION, SOLUTION INTRAVENOUS CONTINUOUS
Status: DISCONTINUED | OUTPATIENT
Start: 2019-05-19 | End: 2019-05-21

## 2019-05-19 RX ORDER — ONDANSETRON 2 MG/ML
4 INJECTION INTRAMUSCULAR; INTRAVENOUS EVERY 6 HOURS PRN
Status: DISCONTINUED | OUTPATIENT
Start: 2019-05-19 | End: 2019-05-20 | Stop reason: SDUPTHER

## 2019-05-19 RX ORDER — DULOXETIN HYDROCHLORIDE 30 MG/1
60 CAPSULE, DELAYED RELEASE ORAL DAILY
Status: DISCONTINUED | OUTPATIENT
Start: 2019-05-19 | End: 2019-05-21 | Stop reason: HOSPADM

## 2019-05-19 RX ORDER — ALPRAZOLAM 0.5 MG/1
0.5 TABLET ORAL
Status: ACTIVE | OUTPATIENT
Start: 2019-05-20 | End: 2019-05-20

## 2019-05-19 RX ADMIN — FUROSEMIDE 40 MG: 40 TABLET ORAL at 17:12

## 2019-05-19 RX ADMIN — PENTOXIFYLLINE 400 MG: 400 TABLET, EXTENDED RELEASE ORAL at 13:13

## 2019-05-19 RX ADMIN — HEPARIN SODIUM 4000 UNITS: 1000 INJECTION, SOLUTION INTRAVENOUS; SUBCUTANEOUS at 17:10

## 2019-05-19 RX ADMIN — FLUTICASONE PROPIONATE 2 SPRAY: 50 SPRAY, METERED NASAL at 13:13

## 2019-05-19 RX ADMIN — ATORVASTATIN CALCIUM 40 MG: 40 TABLET, FILM COATED ORAL at 20:51

## 2019-05-19 RX ADMIN — HEPARIN SODIUM 8.2 UNITS/KG/HR: 10000 INJECTION, SOLUTION INTRAVENOUS at 10:20

## 2019-05-19 RX ADMIN — Medication 10 ML: at 09:07

## 2019-05-19 RX ADMIN — HEPARIN SODIUM 4000 UNITS: 1000 INJECTION, SOLUTION INTRAVENOUS; SUBCUTANEOUS at 23:02

## 2019-05-19 RX ADMIN — LISINOPRIL 10 MG: 10 TABLET ORAL at 09:07

## 2019-05-19 RX ADMIN — HEPARIN SODIUM 4000 UNITS: 1000 INJECTION, SOLUTION INTRAVENOUS; SUBCUTANEOUS at 10:19

## 2019-05-19 RX ADMIN — CARVEDILOL 6.25 MG: 6.25 TABLET, FILM COATED ORAL at 17:12

## 2019-05-19 RX ADMIN — PENTOXIFYLLINE 400 MG: 400 TABLET, EXTENDED RELEASE ORAL at 17:12

## 2019-05-19 RX ADMIN — ISOSORBIDE MONONITRATE 30 MG: 30 TABLET, EXTENDED RELEASE ORAL at 01:55

## 2019-05-19 RX ADMIN — CARVEDILOL 3.12 MG: 3.12 TABLET, FILM COATED ORAL at 09:07

## 2019-05-19 RX ADMIN — DULOXETINE HYDROCHLORIDE 60 MG: 30 CAPSULE, DELAYED RELEASE ORAL at 10:17

## 2019-05-19 RX ADMIN — PANTOPRAZOLE SODIUM 40 MG: 40 TABLET, DELAYED RELEASE ORAL at 09:07

## 2019-05-19 RX ADMIN — Medication 10 ML: at 20:51

## 2019-05-19 ASSESSMENT — ENCOUNTER SYMPTOMS
ABDOMINAL DISTENTION: 0
DIARRHEA: 0
CHEST TIGHTNESS: 1
BLOOD IN STOOL: 0
VOMITING: 0
BACK PAIN: 0
ABDOMINAL PAIN: 0
COUGH: 1
SHORTNESS OF BREATH: 1
WHEEZING: 0

## 2019-05-19 ASSESSMENT — PAIN SCALES - GENERAL: PAINLEVEL_OUTOF10: 0

## 2019-05-19 NOTE — PROGRESS NOTES
PRELIMINARY REPORT    HERNÁN'S  RT PTA= 1.36             LT PTA= 1.42        DPA= 1.28                  DPA= 1.26     DIGIT=  1.19               DIGIT= . 97     PENDING FINAL REPORT

## 2019-05-19 NOTE — H&P
Hospital Medicine    History & Physical      PCP: Priscilla Ferrari MD      CC:   Chief Complaint   Patient presents with    Chest Pain     c/o chest pain and SOA since Wednesday        History Obtained From:  patient, electronic medical record    HISTORY OF PRESENT ILLNESS:      The patient is a 68 y.o. male who presents: with several days history of sob, and chest pain today. Shortness of Breath   This is a new problem. The current episode started in the past 7 days. The problem occurs intermittently. The problem has been gradually improving. Associated symptoms include chest pain and claudication (been treated with pletal ). Pertinent negatives include no abdominal pain, fever, headaches, leg swelling, neck pain, orthopnea, rash, sore throat, sputum production or vomiting. The symptoms are aggravated by exercise. The patient has no known risk factors for DVT/PE. Treatments tried: in er lasix, lopressor, ntg  The treatment provided significant relief. Chest Pain    This is a new problem. The current episode started today. The onset quality is gradual. The problem occurs intermittently. The problem has been gradually improving. The pain is at a severity of 5/10. The pain is mild. The quality of the pain is described as squeezing. The pain does not radiate. Associated symptoms include claudication (been treated with pletal ), a cough, shortness of breath and weakness. Pertinent negatives include no abdominal pain, back pain, dizziness, fever, headaches, nausea, orthopnea, palpitations, sputum production or vomiting. The pain is aggravated by exertion and breathing. Treatments tried: in er lasix , nitro, lopressor. The treatment provided significant relief. Risk factors include male gender and being elderly.    the patient had been treated recently for bronchitis, with steroids, abx,       Past Medical History:        Diagnosis Date    Gout        Past Surgical History:        Procedure Laterality Date    HERNIA REPAIR         Medications Prior to Admission:    Medications Prior to Admission: azithromycin (ZITHROMAX) 250 MG tablet, Take 1 tablet by mouth See Admin Instructions for 5 days 500mg on day 1 followed by 250mg on days 2 - 5  albuterol sulfate HFA (PROVENTIL HFA) 108 (90 Base) MCG/ACT inhaler, Inhale 2 puffs into the lungs every 6 hours as needed for Wheezing  predniSONE (DELTASONE) 20 MG tablet, Take 2 tablets by mouth daily for 4 days, THEN 1 tablet daily for 4 days, THEN 0.5 tablets daily for 4 days. fluticasone (FLONASE) 50 MCG/ACT nasal spray, 2 sprays by Each Nare route daily 1 Spray in each nostril  testosterone cypionate (DEPOTESTOTERONE CYPIONATE) 200 MG/ML injection, Inject 1 mL into the muscle every 14 days for 10 doses. .  DULoxetine (CYMBALTA) 60 MG extended release capsule, Take 1 capsule by mouth daily  sildenafil (VIAGRA) 100 MG tablet, Take 1 tablet by mouth as needed for Erectile Dysfunction  pentoxifylline (TRENTAL) 400 MG extended release tablet, TAKE 1 TABLET BY MOUTH 3 TIMES DAILY (WITH MEALS)  Omega-3 Fatty Acids (FISH OIL PO), Take by mouth daily  Multiple Vitamins-Minerals (ONE DAILY MULTIVITAMIN MEN PO), Take by mouth daily  SYRINGE-NEEDLE, DISP, 3 ML 22G X 1-1/2\" 3 ML MISC, 1 each by Does not apply route every 14 days For testosterone injections  ibuprofen (ADVIL;MOTRIN) 800 MG tablet, Take 800 mg by mouth every 6 hours as needed for Pain. Allergies:  Penicillins    Social History:   TOBACCO:   reports that he quit smoking about 39 years ago. His smoking use included cigarettes. He has never used smokeless tobacco.  ETOH:   reports that he does not drink alcohol. Patient currently lives with family     Family History:   History reviewed. No pertinent family history. I have personally reviewed above histories  REVIEW OF SYSTEMS:  Review of Systems   Constitutional: Negative for chills, fever and weight loss. HENT: Positive for congestion.  Negative for hearing loss, sinus pain and sore throat. Eyes: Negative for blurred vision, double vision, photophobia and pain. Respiratory: Positive for cough and shortness of breath. Negative for sputum production and stridor. Cardiovascular: Positive for chest pain and claudication (been treated with pletal ). Negative for palpitations, orthopnea and leg swelling. Gastrointestinal: Negative for abdominal pain, diarrhea, nausea and vomiting. Genitourinary: Negative for dysuria and urgency. Musculoskeletal: Negative for back pain, myalgias and neck pain. Skin: Negative for itching and rash. Neurological: Positive for weakness. Negative for dizziness, tingling, tremors and headaches. Endo/Heme/Allergies: Does not bruise/bleed easily. Psychiatric/Behavioral: Negative for depression, substance abuse and suicidal ideas. PHYSICAL EXAM:  BP (!) 140/97   Pulse 103   Temp 96.2 °F (35.7 °C) (Temporal)   Resp 18   Ht 6' 3\" (1.905 m)   Wt 270 lb (122.5 kg)   SpO2 93%   BMI 33.75 kg/m²   Physical Exam   Constitutional: He is oriented to person, place, and time. He appears well-developed and well-nourished. He is cooperative. Non-toxic appearance. He does not appear ill. He appears distressed (mild respiratory distress). HENT:   Head: Normocephalic and atraumatic. Eyes: Pupils are equal, round, and reactive to light. Conjunctivae and lids are normal.   Neck: Neck supple. No JVD present. Carotid bruit is not present. No neck rigidity. No thyroid mass and no thyromegaly present. Cardiovascular: Regular rhythm. Tachycardia present. Pulses:       Carotid pulses are 2+ on the right side, and 2+ on the left side. Radial pulses are 2+ on the right side, and 2+ on the left side. Dorsalis pedis pulses are 1+ on the right side, and 1+ on the left side. Pulmonary/Chest: No accessory muscle usage. He is in respiratory distress. He has decreased breath sounds in the right lower field and the left lower field.  He has no wheezes. He has no rhonchi. He has no rales. Abdominal: Soft. Bowel sounds are normal. He exhibits no distension and no ascites. There is no hepatosplenomegaly. Musculoskeletal:        Right lower leg: He exhibits no swelling and no edema. Left lower leg: He exhibits no swelling and no edema. Neurological: He is alert and oriented to person, place, and time. No sensory deficit. GCS eye subscore is 4. GCS verbal subscore is 5. GCS motor subscore is 6. Skin: Skin is warm, dry and intact. No bruising noted. Psychiatric: He has a normal mood and affect. His speech is normal and behavior is normal.        DATA:  EKG:  I have reviewed EKG with the following interpretation:  Imaging:    CTA PULMONARY W CONTRAST   Final Result   1. #1 there is no evidence of embolic disease. 2. Patchy airspace filling infiltrates are noted bilaterally small   pleural effusions although this could be pneumonia most likely this is   congestive failure and pulmonary edema.    Signed by Dr Prema Burger on 5/18/2019 7:04 PM                 Labs Reviewed   CBC WITH AUTO DIFFERENTIAL - Abnormal; Notable for the following components:       Result Value    WBC 13.2 (*)     RBC 4.67 (*)     MCV 98.9 (*)     MCH 33.0 (*)     RDW 14.9 (*)     Neutrophils % 93.0 (*)     Lymphocytes % 4.2 (*)     Neutrophils # 12.3 (*)     Lymphocytes # 0.6 (*)     All other components within normal limits   COMPREHENSIVE METABOLIC PANEL W/ REFLEX TO MG FOR LOW K - Abnormal; Notable for the following components:    Glucose 189 (*)     BUN 27 (*)     All other components within normal limits   TROPONIN - Abnormal; Notable for the following components:    Troponin 0.07 (*)     All other components within normal limits   BLOOD GAS, ARTERIAL - Abnormal; Notable for the following components:    pO2, Arterial 61.0 (*)     All other components within normal limits   BRAIN NATRIURETIC PEPTIDE - Abnormal; Notable for the following components:    Pro-BNP 4,493 (*)     All other components within normal limits   RESPIRATORY CULTURE   POTASSIUM, WHOLE BLOOD          IMPRESSION:    1. SOB due to #2  2. Pulmonary vascular congestion Possible due to #3  3. CHF unknown if systolic or diastolic, or valvular heart disease. 4. Elevated BP  5. ? pvd   6. ?pna will monitor     PLAN:     1. Admit to PCU  2. Serial troponin  3. Echocardiogram  4. Cardiology consult  5. bp control  6. Daily weight   7. Monitor strict I/o  8. Cardiac diet /npo mn  9. Will check alison  10. Review home medications    Further orders per clinical course  CC:  MD Mary Epperson MD      Internal Medicine Hospitalist

## 2019-05-19 NOTE — CONSULTS
[facility]  Cardiology Consult      Requesting MD:  Travis Lovelace MD   Admit Status:  Inpatient [101]       History obtained from:   ? Patient  ? Other (specify):     PROBLEM LIST:    Patient Active Problem List    Diagnosis Date Noted    Elevated troponin 05/19/2019     Priority: Low    Acute decompensated heart failure ([facility] [address] 05/18/2019     Priority: Low    Acute pain of right shoulder 02/21/2019     Priority: Low    Hearing aid worn 10/03/2017     Priority: Low     Overview Note:     left      Idiopathic peripheral neuropathy 12/14/2016     Priority: Low    Benign nodular prostatic hyperplasia without lower urinary tract symptoms 12/09/2016     Priority: Low    Peripheral vascular disease ([facility] [address] 12/09/2016     Priority: Low     Overview Note:     Dorsalis pedis pulses slightly diminished on the right to 1+ where is 2+ on the left. Posterior tibial pulses are +2 and symmetrical bilaterally.  Chronic ulcer of right great toe ([facility] [address] 12/09/2016     Priority: Low    Tinea pedis of left foot 12/09/2016     Priority: Low     Overview Note:     4/5 interspace.  Neuropathy 03/15/2016     Priority: Low    Gout 09/17/2014     Priority: Low    Anxiety 09/17/2014     Priority: Low    Mixed hyperlipidemia 09/17/2014     Priority: Low     1. Non-STEMI presentation with heart failure  2. Chronic nonhealing right toe ulcer with likely peripheral arterial disease    PRESENTATION: Ame Campbell is a 68y.o. year old male who presents with one month of cough with exertional shortness of breath and burning reflux like symptoms of exertional quality in the midsternal area. He was seen locally by primary care with no significant improvement. He reported no leg swelling. This symptom has been progressive over the last month. He reports no lightheadedness or syncope. He has a right 3rd toe ulcer which has not healed over the last year. He has not seen vascular in this regard.   He does not use tobacco.    REVIEW OF SYSTEMS:  Review of Systems   Constitutional: Negative for activity change, diaphoresis and fatigue. HENT: Negative for hearing loss, nosebleeds and tinnitus. Eyes: Negative for visual disturbance. Respiratory: Positive for cough, chest tightness and shortness of breath. Negative for wheezing. Cardiovascular: Positive for chest pain. Negative for palpitations and leg swelling. Gastrointestinal: Negative for abdominal distention, abdominal pain, blood in stool, diarrhea and vomiting. Endocrine: Negative for cold intolerance, heat intolerance, polydipsia, polyphagia and polyuria. Genitourinary: Negative for difficulty urinating, flank pain and hematuria. Musculoskeletal: Negative for arthralgias, back pain, joint swelling and myalgias. Skin: Negative for pallor and rash. Neurological: Negative for dizziness, seizures, syncope and headaches. Psychiatric/Behavioral: Negative for behavioral problems and dysphoric mood. The patient is not nervous/anxious.         Past Medical History:      Diagnosis Date    Gout        Past Surgical History:      Procedure Laterality Date    HERNIA REPAIR         Allergies:  Penicillins    Past Social History:  Social History     Socioeconomic History    Marital status:      Spouse name: Not on file    Number of children: Not on file    Years of education: Not on file    Highest education level: Not on file   Occupational History    Not on file   Social Needs    Financial resource strain: Not on file    Food insecurity:     Worry: Not on file     Inability: Not on file    Transportation needs:     Medical: Not on file     Non-medical: Not on file   Tobacco Use    Smoking status: Former Smoker     Types: Cigarettes     Last attempt to quit: 1980     Years since quittin.0    Smokeless tobacco: Never Used   Substance and Sexual Activity    Alcohol use: No    Drug use: No    Sexual activity: Not on file Lifestyle    Physical activity:     Days per week: Not on file     Minutes per session: Not on file    Stress: Not on file   Relationships    Social connections:     Talks on phone: Not on file     Gets together: Not on file     Attends [employer] service: Not on file     Active member of club or organization: Not on file     Attends meetings of clubs or organizations: Not on file     Relationship status: Not on file    Intimate partner violence:     Fear of current or ex partner: Not on file     Emotionally abused: Not on file     Physically abused: Not on file     Forced sexual activity: Not on file   Other Topics Concern    Not on file   Social History Narrative    Not on file       Family History:   History reviewed. No pertinent family history. Home Meds:  Prior to Admission medications    Medication Sig Start Date End Date Taking? Authorizing Provider   azithromycin (ZITHROMAX) 250 MG tablet Take 1 tablet by mouth See Admin Instructions for 5 days 500mg on day 1 followed by 250mg on days 2 - 5 5/16/19 5/21/19 Yes Kishor Edwards MD   albuterol sulfate HFA (PROVENTIL HFA) 108 (90 Base) MCG/ACT inhaler Inhale 2 puffs into the lungs every 6 hours as needed for Wheezing 5/16/19  Yes Kishor Edwards MD   predniSONE (DELTASONE) 20 MG tablet Take 2 tablets by mouth daily for 4 days, THEN 1 tablet daily for 4 days, THEN 0.5 tablets daily for 4 days. 5/16/19 5/28/19 Yes Kishor Edwards MD   fluticasone [facility]) 50 MCG/ACT nasal spray 2 sprays by Each Nare route daily 1 Spray in each nostril 5/16/19  Yes Kishor Edwards MD   testosterone cypionate (DEPOTESTOTERONE CYPIONATE) 200 MG/ML injection Inject 1 mL into the muscle every 14 days for 10 doses. . 3/1/19 7/6/19 Yes Lonnie Baird MD   DULoxetine (CYMBALTA) 60 MG extended release capsule Take 1 capsule by mouth daily 2/18/19  Yes Lonnie Baird MD   sildenafil (VIAGRA) 100 MG tablet Take 1 tablet by mouth as needed for Erectile Dysfunction 1/16/19  Yes Lonnie Baird

## 2019-05-19 NOTE — PROGRESS NOTES
Hospitalist Progress Note  5/19/2019 9:44 AM  Subjective:   Admit Date: 5/18/2019  PCP: Sravanthi Fuentes MD    Chief Complaint: CP    Subjective: Patient seen and examined. States he feels significantly better. No CP or SOB currently    Cumulative Hospital History:   The patient is a 68 y.o. male who presents: with several days history of sob, and chest pain today.     Shortness of Breath   This is a new problem. The current episode started in the past 7 days. The problem occurs intermittently. The problem has been gradually improving. Associated symptoms include chest pain and claudication (been treated with pletal ). Pertinent negatives include no abdominal pain, fever, headaches, leg swelling, neck pain, orthopnea, rash, sore throat, sputum production or vomiting. The symptoms are aggravated by exercise. The patient has no known risk factors for DVT/PE. Treatments tried: in er lasix, lopressor, ntg  The treatment provided significant relief. Chest Pain    This is a new problem. The current episode started today. The onset quality is gradual. The problem occurs intermittently. The problem has been gradually improving. The pain is at a severity of 5/10. The pain is mild. The quality of the pain is described as squeezing. The pain does not radiate. Associated symptoms include claudication (been treated with pletal ), a cough, shortness of breath and weakness. Pertinent negatives include no abdominal pain, back pain, dizziness, fever, headaches, nausea, orthopnea, palpitations, sputum production or vomiting. The pain is aggravated by exertion and breathing. Treatments tried: in er lasix , nitro, lopressor. The treatment provided significant relief. Risk factors include male gender and being elderly. the patient had been treated recently for bronchitis, with steroids, abx,     05/19: Patient subjectively improved. Trop trending up, to start heparin gtt and plan for [facility] tomorrow per cardiology.   Continue to monitor. Continue to diurese with lasix BID, monitor IS and Os. TTE pending. Resume home meds. ROS: 14 point review of systems is negative except as specifically addressed above.     DIET CARDIAC; No Caffeine    Intake/Output Summary (Last 24 hours) at 5/19/2019 0944  Last data filed at 5/19/2019 0800  Gross per 24 hour   Intake 580 ml   Output 2450 ml   Net -1870 ml     Medications:   heparin (porcine)       Current Facility-Administered Medications   Medication Dose Route Frequency Provider Last Rate Last Dose    isosorbide mononitrate (IMDUR) extended release tablet 30 mg  30 mg Oral Daily Tariq Robertson MD   30 mg at 05/19/19 0155    carvedilol (COREG) tablet 6.25 mg  6.25 mg Oral BID  Tariq Robertson MD        heparin (porcine) injection 4,000 Units  4,000 Units Intravenous Once Tariq Robertson MD        heparin (porcine) injection 4,000 Units  4,000 Units Intravenous PRN Tariq Robertson MD        heparin (porcine) injection 2,000 Units  2,000 Units Intravenous PRN Tariq Robertson MD        heparin 25,000 units in dextrose 5% 250 mL infusion  8.2 Units/kg/hr Intravenous Continuous Tariq Robertson MD        atorvastatin (LIPITOR) tablet 40 mg  40 mg Oral Nightly Tariq Robertson MD        furosemide (LASIX) tablet 40 mg  40 mg Oral BID Tariq Robertson MD        albuterol sulfate  (90 Base) MCG/ACT inhaler 2 puff  2 puff Inhalation Q6H PRN Lynne Pagan MD        DULoxetine (CYMBALTA) extended release capsule 60 mg  60 mg Oral Daily Lynne Pagan MD        fluticasone [facility]) 50 MCG/ACT nasal spray 2 spray  2 spray Each Nare Daily Lynne Pagan MD        pentoxifylline (TRENTAL) extended release tablet 400 mg  400 mg Oral TID  Lynne Pagan MD        ipratropium-albuterol (DUONEB) nebulizer solution 1 ampule  1 ampule Inhalation Once Regina Lloyd MD        sodium chloride flush 0.9 % injection 10 mL  10 mL Intravenous 2 times per day Mainor Murcia MD   10 mL at 05/19/19 0907    sodium 580 ml   Output 2450 ml   Net -1870 ml     General appearance: alert and cooperative with exam  HEENT: atraumatic, eyes with clear conjunctiva and normal lids, pupils and irises normal, external ears and nose are normal, lips normal.  Neck without masses, lympadenopathy, bruit, thyroid normal  Lungs: no increased work of breathing, diminished breath sounds bilaterally  Heart: regular rate and rhythm and S1, S2 normal  Abdomen: soft, non-tender; bowel sounds normal; no masses,  no organomegaly  Extremities: extremities normal, atraumatic, no cyanosis or edema  Neurologic: No focal neurologic deficits, normal sensation, alert and oriented, affect and mood appropriate. Skin: no rashes, nodules. Assessment and Plan: Active Problems:    Acute decompensated heart failure (HCC)  Elevated trop  PVD  Resolved Problems:    * No resolved hospital problems. *    Patient subjectively improved. Trop trending up, to start heparin gtt and plan for [facility] tomorrow per cardiology. Continue to monitor. Continue to diurese with lasix BID, monitor IS and Os. TTE pending. Resume home meds.     Advance Directive: Full Code    DVT prophylaxis: heparin    Discharge planning: TBD      Zay Marie MD  [facility] Hospitalist

## 2019-05-19 NOTE — PROGRESS NOTES
4 Eyes Skin Assessment    Renzo Grant is being assessed upon: Admission    I agree that I, [facility], along with *** (either 2 RN's or 1 LPN and 1 RN) have performed a thorough Head to Toe Skin Assessment on the patient. ALL assessment sites listed below have been assessed. Areas assessed by both nurses:     [x]   Head, Face, and Ears   [x]   Shoulders, Back, and Chest  [x]   Arms, Elbows, and Hands   [x]   Coccyx, Sacrum, and Ischium  [x]   Legs, Feet, and Heels    Does the Patient have Skin Breakdown? Yes, wound(s) noted upon assessment. It is the responsibility of the Primary Nurse to assure that the following documentation, preventions, orders, and consults are complete on the above noted wound(s): Wound LDA initiated. LDA Flowsheet Documentation includes the Vicenta-wound, Wound Assessment, Measurements, Dressing Treatment, Drainage, and Color.  Pt has ulcer like wound on left foot second great toe     Won Prevention initiated: Yes  Wound Care Orders initiated: Yes    [id number]  nurse consulted for Pressure Injury (Stage 3,4, Unstageable, DTI, NWPT, and Complex wounds) and New or Established Ostomies: Yes        Primary Nurse eSignature: [facility], RN on 5/18/2019 at 10:15 PM      Co-Signer eSignature: {Esignature:[id number]}

## 2019-05-19 NOTE — PROGRESS NOTES
Pt's troponin elevated, pt's blood pressure elevated, pt is resting in bed with no complaints of chest pain or diaphoresis. Pt is warm and dry and comfortable. Ekg performed. Dr. Nish Harris notified of lab values and BP. Orders placed for Imdur 30mg once daily. Will continue to monitor.

## 2019-05-20 PROBLEM — I21.4 NSTEMI (NON-ST ELEVATED MYOCARDIAL INFARCTION) (HCC): Status: ACTIVE | Noted: 2019-05-20

## 2019-05-20 PROBLEM — I50.23 ACUTE ON CHRONIC SYSTOLIC CHF (CONGESTIVE HEART FAILURE) (HCC): Status: ACTIVE | Noted: 2019-05-20

## 2019-05-20 LAB
ANION GAP SERPL CALCULATED.3IONS-SCNC: 9 MMOL/L (ref 7–19)
APTT: 36.5 SEC (ref 26–36.2)
APTT: 45.1 SEC (ref 26–36.2)
APTT: 89.4 SEC (ref 26–36.2)
BASOPHILS ABSOLUTE: 0.1 K/UL (ref 0–0.2)
BASOPHILS RELATIVE PERCENT: 0.8 % (ref 0–1)
BUN BLDV-MCNC: 28 MG/DL (ref 8–23)
CALCIUM SERPL-MCNC: 9 MG/DL (ref 8.8–10.2)
CHLORIDE BLD-SCNC: 98 MMOL/L (ref 98–111)
CO2: 28 MMOL/L (ref 22–29)
CREAT SERPL-MCNC: 1 MG/DL (ref 0.5–1.2)
EOSINOPHILS ABSOLUTE: 0.1 K/UL (ref 0–0.6)
EOSINOPHILS RELATIVE PERCENT: 1.3 % (ref 0–5)
GFR NON-AFRICAN AMERICAN: >60
GLUCOSE BLD-MCNC: 108 MG/DL (ref 74–109)
HCT VFR BLD CALC: 41.5 % (ref 42–52)
HEMOGLOBIN: 13.6 G/DL (ref 14–18)
LV EF: 35 %
LVEF MODALITY: NORMAL
LYMPHOCYTES ABSOLUTE: 1.6 K/UL (ref 1.1–4.5)
LYMPHOCYTES RELATIVE PERCENT: 14.7 % (ref 20–40)
MAGNESIUM: 2 MG/DL (ref 1.6–2.4)
MCH RBC QN AUTO: 32.5 PG (ref 27–31)
MCHC RBC AUTO-ENTMCNC: 32.8 G/DL (ref 33–37)
MCV RBC AUTO: 99.3 FL (ref 80–94)
MONOCYTES ABSOLUTE: 1.3 K/UL (ref 0–0.9)
MONOCYTES RELATIVE PERCENT: 11.4 % (ref 0–10)
NEUTROPHILS ABSOLUTE: 7.8 K/UL (ref 1.5–7.5)
NEUTROPHILS RELATIVE PERCENT: 71.1 % (ref 50–65)
PDW BLD-RTO: 15.1 % (ref 11.5–14.5)
PLATELET # BLD: 242 K/UL (ref 130–400)
PMV BLD AUTO: 11.2 FL (ref 9.4–12.4)
POTASSIUM SERPL-SCNC: 4.3 MMOL/L (ref 3.5–5)
PRO-BNP: 4026 PG/ML (ref 0–900)
RBC # BLD: 4.18 M/UL (ref 4.7–6.1)
SODIUM BLD-SCNC: 135 MMOL/L (ref 136–145)
TROPONIN: 3.31 NG/ML (ref 0–0.03)
TROPONIN: 4.44 NG/ML (ref 0–0.03)
TROPONIN: 4.6 NG/ML (ref 0–0.03)
WBC # BLD: 11 K/UL (ref 4.8–10.8)

## 2019-05-20 PROCEDURE — 92928 PRQ TCAT PLMT NTRAC ST 1 LES: CPT

## 2019-05-20 PROCEDURE — 36415 COLL VENOUS BLD VENIPUNCTURE: CPT

## 2019-05-20 PROCEDURE — 80048 BASIC METABOLIC PNL TOTAL CA: CPT

## 2019-05-20 PROCEDURE — C1887 CATHETER, GUIDING: HCPCS

## 2019-05-20 PROCEDURE — B2151ZZ FLUOROSCOPY OF LEFT HEART USING LOW OSMOLAR CONTRAST: ICD-10-PCS | Performed by: INTERNAL MEDICINE

## 2019-05-20 PROCEDURE — 85025 COMPLETE CBC W/AUTO DIFF WBC: CPT

## 2019-05-20 PROCEDURE — 92928 PRQ TCAT PLMT NTRAC ST 1 LES: CPT | Performed by: INTERNAL MEDICINE

## 2019-05-20 PROCEDURE — B2111ZZ FLUOROSCOPY OF MULTIPLE CORONARY ARTERIES USING LOW OSMOLAR CONTRAST: ICD-10-PCS | Performed by: INTERNAL MEDICINE

## 2019-05-20 PROCEDURE — 6370000000 HC RX 637 (ALT 250 FOR IP)

## 2019-05-20 PROCEDURE — 84484 ASSAY OF TROPONIN QUANT: CPT

## 2019-05-20 PROCEDURE — 6370000000 HC RX 637 (ALT 250 FOR IP): Performed by: INTERNAL MEDICINE

## 2019-05-20 PROCEDURE — 027036Z DILATION OF CORONARY ARTERY, ONE ARTERY WITH THREE DRUG-ELUTING INTRALUMINAL DEVICES, PERCUTANEOUS APPROACH: ICD-10-PCS | Performed by: INTERNAL MEDICINE

## 2019-05-20 PROCEDURE — 2580000003 HC RX 258: Performed by: INTERNAL MEDICINE

## 2019-05-20 PROCEDURE — 99152 MOD SED SAME PHYS/QHP 5/>YRS: CPT

## 2019-05-20 PROCEDURE — 6360000002 HC RX W HCPCS

## 2019-05-20 PROCEDURE — C1874 STENT, COATED/COV W/DEL SYS: HCPCS

## 2019-05-20 PROCEDURE — 2500000003 HC RX 250 WO HCPCS

## 2019-05-20 PROCEDURE — 4A023N7 MEASUREMENT OF CARDIAC SAMPLING AND PRESSURE, LEFT HEART, PERCUTANEOUS APPROACH: ICD-10-PCS | Performed by: INTERNAL MEDICINE

## 2019-05-20 PROCEDURE — C1725 CATH, TRANSLUMIN NON-LASER: HCPCS

## 2019-05-20 PROCEDURE — 99152 MOD SED SAME PHYS/QHP 5/>YRS: CPT | Performed by: INTERNAL MEDICINE

## 2019-05-20 PROCEDURE — 83735 ASSAY OF MAGNESIUM: CPT

## 2019-05-20 PROCEDURE — 2709999900 HC NON-CHARGEABLE SUPPLY

## 2019-05-20 PROCEDURE — 99232 SBSQ HOSP IP/OBS MODERATE 35: CPT | Performed by: INTERNAL MEDICINE

## 2019-05-20 PROCEDURE — 6360000002 HC RX W HCPCS: Performed by: INTERNAL MEDICINE

## 2019-05-20 PROCEDURE — 93458 L HRT ARTERY/VENTRICLE ANGIO: CPT | Performed by: INTERNAL MEDICINE

## 2019-05-20 PROCEDURE — C1894 INTRO/SHEATH, NON-LASER: HCPCS

## 2019-05-20 PROCEDURE — 83880 ASSAY OF NATRIURETIC PEPTIDE: CPT

## 2019-05-20 PROCEDURE — 85730 THROMBOPLASTIN TIME PARTIAL: CPT

## 2019-05-20 PROCEDURE — C1769 GUIDE WIRE: HCPCS

## 2019-05-20 PROCEDURE — 99153 MOD SED SAME PHYS/QHP EA: CPT

## 2019-05-20 PROCEDURE — 2140000000 HC CCU INTERMEDIATE R&B

## 2019-05-20 PROCEDURE — 2700000000 HC OXYGEN THERAPY PER DAY

## 2019-05-20 PROCEDURE — 6360000004 HC RX CONTRAST MEDICATION: Performed by: INTERNAL MEDICINE

## 2019-05-20 PROCEDURE — 93458 L HRT ARTERY/VENTRICLE ANGIO: CPT

## 2019-05-20 RX ORDER — ASPIRIN 81 MG/1
81 TABLET, CHEWABLE ORAL DAILY
Status: DISCONTINUED | OUTPATIENT
Start: 2019-05-20 | End: 2019-05-21 | Stop reason: HOSPADM

## 2019-05-20 RX ORDER — SODIUM CHLORIDE 9 MG/ML
INJECTION, SOLUTION INTRAVENOUS CONTINUOUS
Status: DISCONTINUED | OUTPATIENT
Start: 2019-05-20 | End: 2019-05-21

## 2019-05-20 RX ORDER — CLOPIDOGREL BISULFATE 75 MG/1
75 TABLET ORAL DAILY
Status: DISCONTINUED | OUTPATIENT
Start: 2019-05-21 | End: 2019-05-21 | Stop reason: HOSPADM

## 2019-05-20 RX ORDER — ONDANSETRON 2 MG/ML
4 INJECTION INTRAMUSCULAR; INTRAVENOUS EVERY 6 HOURS PRN
Status: DISCONTINUED | OUTPATIENT
Start: 2019-05-20 | End: 2019-05-21 | Stop reason: HOSPADM

## 2019-05-20 RX ORDER — ACETAMINOPHEN 325 MG/1
650 TABLET ORAL EVERY 4 HOURS PRN
Status: DISCONTINUED | OUTPATIENT
Start: 2019-05-20 | End: 2019-05-21 | Stop reason: HOSPADM

## 2019-05-20 RX ORDER — SODIUM CHLORIDE 0.9 % (FLUSH) 0.9 %
10 SYRINGE (ML) INJECTION EVERY 12 HOURS SCHEDULED
Status: DISCONTINUED | OUTPATIENT
Start: 2019-05-20 | End: 2019-05-21 | Stop reason: HOSPADM

## 2019-05-20 RX ORDER — SODIUM CHLORIDE 0.9 % (FLUSH) 0.9 %
10 SYRINGE (ML) INJECTION PRN
Status: DISCONTINUED | OUTPATIENT
Start: 2019-05-20 | End: 2019-05-21 | Stop reason: HOSPADM

## 2019-05-20 RX ADMIN — DULOXETINE HYDROCHLORIDE 60 MG: 30 CAPSULE, DELAYED RELEASE ORAL at 10:57

## 2019-05-20 RX ADMIN — PENTOXIFYLLINE 400 MG: 400 TABLET, EXTENDED RELEASE ORAL at 16:28

## 2019-05-20 RX ADMIN — PENTOXIFYLLINE 400 MG: 400 TABLET, EXTENDED RELEASE ORAL at 10:57

## 2019-05-20 RX ADMIN — ISOSORBIDE MONONITRATE 30 MG: 30 TABLET, EXTENDED RELEASE ORAL at 10:57

## 2019-05-20 RX ADMIN — PANTOPRAZOLE SODIUM 40 MG: 40 TABLET, DELAYED RELEASE ORAL at 05:42

## 2019-05-20 RX ADMIN — ASPIRIN 81 MG 81 MG: 81 TABLET ORAL at 16:28

## 2019-05-20 RX ADMIN — SODIUM CHLORIDE: 9 INJECTION, SOLUTION INTRAVENOUS at 16:28

## 2019-05-20 RX ADMIN — FLUTICASONE PROPIONATE 2 SPRAY: 50 SPRAY, METERED NASAL at 10:58

## 2019-05-20 RX ADMIN — Medication 10 ML: at 20:14

## 2019-05-20 RX ADMIN — SODIUM CHLORIDE: 9 INJECTION, SOLUTION INTRAVENOUS at 03:46

## 2019-05-20 RX ADMIN — HEPARIN SODIUM 12.22 UNITS/KG/HR: 10000 INJECTION, SOLUTION INTRAVENOUS at 03:45

## 2019-05-20 RX ADMIN — FUROSEMIDE 40 MG: 40 TABLET ORAL at 16:28

## 2019-05-20 RX ADMIN — ATORVASTATIN CALCIUM 40 MG: 40 TABLET, FILM COATED ORAL at 20:14

## 2019-05-20 RX ADMIN — LISINOPRIL 10 MG: 10 TABLET ORAL at 10:57

## 2019-05-20 RX ADMIN — CARVEDILOL 6.25 MG: 6.25 TABLET, FILM COATED ORAL at 10:57

## 2019-05-20 RX ADMIN — FUROSEMIDE 40 MG: 40 TABLET ORAL at 10:57

## 2019-05-20 RX ADMIN — IOPAMIDOL 240 ML: 612 INJECTION, SOLUTION INTRAVENOUS at 10:34

## 2019-05-20 ASSESSMENT — PAIN SCALES - GENERAL
PAINLEVEL_OUTOF10: 0

## 2019-05-20 NOTE — PROGRESS NOTES
Pt back from cath lab. A/o x4 bedrest x 4 hours. Right radial tr band, pulses palpable, no blood noted. Sight soft non bruised.

## 2019-05-20 NOTE — PROGRESS NOTES
Subjective :      Christos Rodriguez is a 68 y.o. male referral for wound on right great toe. Patient has a Other: callus wound which is located on the Rt. foot 2nd toe. Objective:        Wound:  Right 2nd toe with callus on tip, noted open area 0.4cm x 0.6cm x 0.2cm, wound bed is red, no odor, no drainage. Wound Description: see above  This was originally measured on 5/19/19  Measurements shown are from today's visit:5/20/10      Assessment :      Other: callus right 2nd toe  Patient wife stated patient sees Dr. Temo Sandoval and APR in [place], but has not been in awhile. Stated patient uses an antibiotic ointment to wound bed. Discussed with patient and wife to follow up with Dr. Temo Sandoval as the callus is thick and will probably need to be trimmed back to help the wound to close. They verbalized understanding and stated would follow up. Plan :      Plan for wound:Cover with dry gauze dressing and secure with medipore tape. Dress per physician order:Perfect serve to Dr. Sanjuanita Ramirez re: recommendations soap and water wash and Bactroban ointment. Pressure Ulcer Prevention Protocol and wound care order set reviewed and updated. Call placed to [facility] for bed extender.       Vin Martin RN 5/20/2019

## 2019-05-20 NOTE — PROGRESS NOTES
Nutrition Assessment    Type and Reason for Visit: Initial, Consult    Nutrition Recommendations: follow for CHF ed    Nutrition Assessment: Received consult for CHF eduation. At time of visit, pt was NPO for heart cath. Malnutrition Assessment:  · Malnutrition Status: No malnutrition  · Context: Acute illness or injury  · Findings of the 6 clinical characteristics of malnutrition (Minimum of 2 out of 6 clinical characteristics is required to make the diagnosis of moderate or severe Protein Calorie Malnutrition based on AND/ASPEN Guidelines):  1. Energy Intake- ,      2. Weight Loss-No significant weight loss,    3. Fat Loss-No significant subcutaneous fat loss,    4. Muscle Loss-No significant muscle mass loss,    5. Fluid Accumulation-No significant fluid accumulation,    6.  Strength-     Nutrition Risk Level: Low    Nutrition Diagnosis:   · Problem: Overweight/Obese, Altered nutrition-related lab values  · Etiology: related to Cardiac dysfunction(excess calorie intake for activity level)     Signs and symptoms:  as evidenced by BMI, Lab values    Objective Information:  · Nutrition-Focused Physical Findings:    · Wound Type: Venous Stasis  · Current Nutrition Therapies:  · Oral Diet Orders: NPO   · Oral Diet intake: NPO  · Oral Nutrition Supplement (ONS) Orders: None  · ONS intake: NPO     · Anthropometric Measures:  · Ht: 6' 3\" (190.5 cm)   · Current Body Wt: 270 lb 1 oz (122.5 kg)  · Admission Body Wt: 270 lb (122.5 kg)  · Usual Body Wt: 263 lb 9 oz (119.6 kg)(2/2019)  · % Weight Change:  ,  2.3% increse in 3 months  · Ideal Body Wt: 196 lb (88.9 kg), % Ideal Body 137.8%  · BMI Classification: BMI 30.0 - 34.9 Obese Class I    Nutrition Interventions:   Start oral diet  Continued Inpatient Monitoring    Nutrition Evaluation:   · Evaluation: Goals set   · Goals: po intake 50% or greater. Weight stable.   Able to verbalize 4 foods high in sodium    · Monitoring: Meal Intake, Diet Tolerance, Skin Integrity, Weight, Pertinent Labs, Patient/Family Education      Electronically signed by Dandre Ashley, MS, RD, LD on 5/20/19 at 2:09 PM    Contact Number: [phone]

## 2019-05-20 NOTE — PROGRESS NOTES
Hospitalist Progress Note  5/20/2019 4:33 PM  Subjective:   Admit Date: 5/18/2019  PCP: Reyes Guerrero MD    Chief Complaint: CP    Subjective: Patient seen and examined. No acute complaints. Denies SOB or CP. No acute events per nursing. Cumulative Hospital History:   The patient is a 68 y.o. male who presents: with several days history of sob, and chest pain today.     Shortness of Breath   This is a new problem. The current episode started in the past 7 days. The problem occurs intermittently. The problem has been gradually improving. Associated symptoms include chest pain and claudication (been treated with pletal ). Pertinent negatives include no abdominal pain, fever, headaches, leg swelling, neck pain, orthopnea, rash, sore throat, sputum production or vomiting. The symptoms are aggravated by exercise. The patient has no known risk factors for DVT/PE. Treatments tried: in er lasix, lopressor, ntg  The treatment provided significant relief. Chest Pain    This is a new problem. The current episode started today. The onset quality is gradual. The problem occurs intermittently. The problem has been gradually improving. The pain is at a severity of 5/10. The pain is mild. The quality of the pain is described as squeezing. The pain does not radiate. Associated symptoms include claudication (been treated with pletal ), a cough, shortness of breath and weakness. Pertinent negatives include no abdominal pain, back pain, dizziness, fever, headaches, nausea, orthopnea, palpitations, sputum production or vomiting. The pain is aggravated by exertion and breathing. Treatments tried: in er lasix , nitro, lopressor. The treatment provided significant relief. Risk factors include male gender and being elderly. the patient had been treated recently for bronchitis, with steroids, abx,     05/19: Patient subjectively improved. Trop trending up, to start heparin gtt and plan for [facility] tomorrow per cardiology.   Continue to monitor. Continue to diurese with lasix BID, monitor IS and Os. TTE pending. Resume home meds. 05/20: Patient with PCI, SUSANNE. Will monitor overnight. Continue medical management. ASA/plavix for DAPT. ROS: 14 point review of systems is negative except as specifically addressed above.     DIET CARDIAC; No Caffeine    Intake/Output Summary (Last 24 hours) at 5/20/2019 1633  Last data filed at 5/20/2019 1618  Gross per 24 hour   Intake 1930.08 ml   Output 1250 ml   Net 680.08 ml     Medications:   sodium chloride 125 mL/hr at 05/20/19 1628    heparin (porcine) 11.2 Units/kg/hr (05/20/19 0449)     Current Facility-Administered Medications   Medication Dose Route Frequency Provider Last Rate Last Dose    0.9 % sodium chloride infusion   Intravenous Continuous Alexandra Tilley  mL/hr at 05/20/19 1628      sodium chloride flush 0.9 % injection 10 mL  10 mL Intravenous 2 times per day Alexandra Tilley MD        sodium chloride flush 0.9 % injection 10 mL  10 mL Intravenous PRN Alexandra Tilley MD        acetaminophen (TYLENOL) tablet 650 mg  650 mg Oral Q4H PRN Alexandra Tilley MD        ondansetron [facility]) injection 4 mg  4 mg Intravenous Q6H PRN Alexandra Tilley MD        [START ON 5/21/2019] clopidogrel (PLAVIX) tablet 75 mg  75 mg Oral Daily Alexandra Tilley MD        aspirin chewable tablet 81 mg  81 mg Oral Daily Tana Quijano MD   81 mg at 05/20/19 1628    isosorbide mononitrate (IMDUR) extended release tablet 30 mg  30 mg Oral Daily Alexandra Tilley MD   30 mg at 05/20/19 1057    carvedilol (COREG) tablet 6.25 mg  6.25 mg Oral BID WC Alexandra Tilley MD   6.25 mg at 05/20/19 1057    heparin (porcine) injection 4,000 Units  4,000 Units Intravenous PRN Alxeandra Tilley MD   4,000 Units at 05/19/19 2302    heparin (porcine) injection 2,000 Units  2,000 Units Intravenous PRHASEEB Tilley MD        heparin 25,000 units in dextrose 5% 250 mL infusion  8.2 Units/kg/hr Intravenous Continuous Alexandra Tilley MD 13.7 mL/hr at 05/20/19 0449 11.2 MWF6IRS 36.0   PO2ART 61.0*   NBM2LGB 22.3   BEART -2.0   HGBAE 15.0   M9CPNDMG 91.0   CARBOXHGBART 2.0   02THERAPY Unknown     HgBA1c:   Recent Labs     05/18/19 2329   LABA1C 5.0     FLP:    Lab Results   Component Value Date    TRIG 186 05/19/2019    TRIG 216 05/23/2014    HDL 42 05/19/2019    LDLCALC 86 05/19/2019     TSH:    Lab Results   Component Value Date    TSH 2.300 05/18/2019     Troponin T:   Recent Labs     05/18/19  2329 05/19/19  0153 05/20/19  1053   TROPONINI 0.33* 0.40* 4.60*     INR: No results for input(s): INR in the last 72 hours. Objective:   Vitals: BP 92/60   Pulse 86   Temp 96.3 °F (35.7 °C) (Temporal)   Resp 16   Ht 6' 3\" (1.905 m)   Wt 270 lb 1.6 oz (122.5 kg)   SpO2 99%   BMI 33.76 kg/m²   24HR INTAKE/OUTPUT:      Intake/Output Summary (Last 24 hours) at 5/20/2019 1633  Last data filed at 5/20/2019 1618  Gross per 24 hour   Intake 1930.08 ml   Output 1250 ml   Net 680.08 ml     General appearance: alert and cooperative with exam  HEENT: atraumatic, eyes with clear conjunctiva and normal lids  Lungs: no increased work of breathing, diminished breath sounds bilaterally  Heart: regular rate and rhythm and S1, S2 normal  Abdomen: soft, non-tender; bowel sounds normal; no masses,  no organomegaly  Extremities: extremities normal, atraumatic, no cyanosis or edema  Neurologic: No focal neurologic deficits, normal sensation, alert and oriented, affect and mood appropriate. Skin: no rashes, nodules. Assessment and Plan: Active Problems:    Acute on chronic decompensated systolic heart failure (HCC)  Elevated trop  PVD  Resolved Problems:    * No resolved hospital problems. *    Patient with PCI, SUSANNE. Will monitor overnight. Continue medical management. ASA/plavix for DAPT, statin, ACEi, BB.       Remains on lasix BID, continue to monitor Is and Os    Advance Directive: Full Code    DVT prophylaxis: heparin    Discharge planning: TBBEBETO Jolly MD  Rounding

## 2019-05-20 NOTE — PROGRESS NOTES
Occasional Productive cough of scant amount of blood tinge sputum. Continue to monitor for further sputum.   Electronically signed by Haylee Orozco RN on 5/20/2019 at 2:15 AM

## 2019-05-21 VITALS
HEART RATE: 84 BPM | SYSTOLIC BLOOD PRESSURE: 85 MMHG | WEIGHT: 265.4 LBS | TEMPERATURE: 96.8 F | BODY MASS INDEX: 33 KG/M2 | HEIGHT: 75 IN | DIASTOLIC BLOOD PRESSURE: 55 MMHG | RESPIRATION RATE: 18 BRPM | OXYGEN SATURATION: 97 %

## 2019-05-21 LAB
ANION GAP SERPL CALCULATED.3IONS-SCNC: 12 MMOL/L (ref 7–19)
APTT: 29.7 SEC (ref 26–36.2)
APTT: 32.3 SEC (ref 26–36.2)
BASOPHILS ABSOLUTE: 0.1 K/UL (ref 0–0.2)
BASOPHILS RELATIVE PERCENT: 0.6 % (ref 0–1)
BUN BLDV-MCNC: 26 MG/DL (ref 8–23)
CALCIUM SERPL-MCNC: 8.6 MG/DL (ref 8.8–10.2)
CHLORIDE BLD-SCNC: 101 MMOL/L (ref 98–111)
CO2: 24 MMOL/L (ref 22–29)
CREAT SERPL-MCNC: 1.3 MG/DL (ref 0.5–1.2)
EKG P AXIS: 65 DEGREES
EKG P-R INTERVAL: 174 MS
EKG Q-T INTERVAL: 380 MS
EKG QRS DURATION: 150 MS
EKG QTC CALCULATION (BAZETT): 432 MS
EKG T AXIS: 45 DEGREES
EOSINOPHILS ABSOLUTE: 0.2 K/UL (ref 0–0.6)
EOSINOPHILS RELATIVE PERCENT: 1.7 % (ref 0–5)
GFR NON-AFRICAN AMERICAN: 54
GLUCOSE BLD-MCNC: 90 MG/DL (ref 74–109)
HCT VFR BLD CALC: 38.5 % (ref 42–52)
HEMOGLOBIN: 12.8 G/DL (ref 14–18)
LYMPHOCYTES ABSOLUTE: 1.3 K/UL (ref 1.1–4.5)
LYMPHOCYTES RELATIVE PERCENT: 11.9 % (ref 20–40)
MAGNESIUM: 2.1 MG/DL (ref 1.6–2.4)
MCH RBC QN AUTO: 32 PG (ref 27–31)
MCHC RBC AUTO-ENTMCNC: 33.2 G/DL (ref 33–37)
MCV RBC AUTO: 96.3 FL (ref 80–94)
MONOCYTES ABSOLUTE: 1.2 K/UL (ref 0–0.9)
MONOCYTES RELATIVE PERCENT: 10.5 % (ref 0–10)
NEUTROPHILS ABSOLUTE: 8.2 K/UL (ref 1.5–7.5)
NEUTROPHILS RELATIVE PERCENT: 74.5 % (ref 50–65)
PDW BLD-RTO: 14.7 % (ref 11.5–14.5)
PLATELET # BLD: 215 K/UL (ref 130–400)
PMV BLD AUTO: 10 FL (ref 9.4–12.4)
POTASSIUM SERPL-SCNC: 4.4 MMOL/L (ref 3.5–5)
RBC # BLD: 4 M/UL (ref 4.7–6.1)
SODIUM BLD-SCNC: 137 MMOL/L (ref 136–145)
WBC # BLD: 11 K/UL (ref 4.8–10.8)

## 2019-05-21 PROCEDURE — 6370000000 HC RX 637 (ALT 250 FOR IP): Performed by: INTERNAL MEDICINE

## 2019-05-21 PROCEDURE — 93005 ELECTROCARDIOGRAM TRACING: CPT

## 2019-05-21 PROCEDURE — 99232 SBSQ HOSP IP/OBS MODERATE 35: CPT | Performed by: INTERNAL MEDICINE

## 2019-05-21 PROCEDURE — 85730 THROMBOPLASTIN TIME PARTIAL: CPT

## 2019-05-21 PROCEDURE — 99239 HOSP IP/OBS DSCHRG MGMT >30: CPT | Performed by: HOSPITALIST

## 2019-05-21 PROCEDURE — 2700000000 HC OXYGEN THERAPY PER DAY

## 2019-05-21 PROCEDURE — 80048 BASIC METABOLIC PNL TOTAL CA: CPT

## 2019-05-21 PROCEDURE — 85025 COMPLETE CBC W/AUTO DIFF WBC: CPT

## 2019-05-21 PROCEDURE — 2580000003 HC RX 258: Performed by: INTERNAL MEDICINE

## 2019-05-21 PROCEDURE — 36415 COLL VENOUS BLD VENIPUNCTURE: CPT

## 2019-05-21 PROCEDURE — 83735 ASSAY OF MAGNESIUM: CPT

## 2019-05-21 RX ORDER — CARVEDILOL 6.25 MG/1
6.25 TABLET ORAL 2 TIMES DAILY WITH MEALS
Qty: 60 TABLET | Refills: 3 | Status: SHIPPED | OUTPATIENT
Start: 2019-05-21 | End: 2019-06-11 | Stop reason: DRUGHIGH

## 2019-05-21 RX ORDER — NITROGLYCERIN 0.4 MG/1
TABLET SUBLINGUAL
Qty: 25 TABLET | Refills: 3 | Status: SHIPPED | OUTPATIENT
Start: 2019-05-21 | End: 2020-06-16

## 2019-05-21 RX ORDER — FUROSEMIDE 40 MG/1
40 TABLET ORAL 2 TIMES DAILY
Qty: 60 TABLET | Refills: 3 | Status: SHIPPED | OUTPATIENT
Start: 2019-05-21 | End: 2019-07-02

## 2019-05-21 RX ORDER — ISOSORBIDE MONONITRATE 30 MG/1
30 TABLET, EXTENDED RELEASE ORAL DAILY
Qty: 30 TABLET | Refills: 3 | Status: SHIPPED | OUTPATIENT
Start: 2019-05-22 | End: 2019-09-04 | Stop reason: SDUPTHER

## 2019-05-21 RX ORDER — ASPIRIN 81 MG/1
81 TABLET, CHEWABLE ORAL DAILY
Qty: 30 TABLET | Refills: 3 | Status: SHIPPED | OUTPATIENT
Start: 2019-05-22 | End: 2019-08-01 | Stop reason: SDUPTHER

## 2019-05-21 RX ORDER — FLUTICASONE PROPIONATE 50 MCG
2 SPRAY, SUSPENSION (ML) NASAL DAILY
Qty: 1 BOTTLE | Refills: 3 | Status: SHIPPED | OUTPATIENT
Start: 2019-05-22 | End: 2019-12-11

## 2019-05-21 RX ORDER — CLOPIDOGREL BISULFATE 75 MG/1
75 TABLET ORAL DAILY
Qty: 30 TABLET | Refills: 3 | Status: SHIPPED | OUTPATIENT
Start: 2019-05-22 | End: 2019-09-04 | Stop reason: SDUPTHER

## 2019-05-21 RX ORDER — ATORVASTATIN CALCIUM 40 MG/1
40 TABLET, FILM COATED ORAL NIGHTLY
Qty: 30 TABLET | Refills: 2 | Status: SHIPPED | OUTPATIENT
Start: 2019-05-21 | End: 2019-08-01 | Stop reason: SDUPTHER

## 2019-05-21 RX ADMIN — ISOSORBIDE MONONITRATE 30 MG: 30 TABLET, EXTENDED RELEASE ORAL at 08:44

## 2019-05-21 RX ADMIN — SACUBITRIL AND VALSARTAN 1 TABLET: 49; 51 TABLET, FILM COATED ORAL at 09:40

## 2019-05-21 RX ADMIN — ASPIRIN 81 MG 81 MG: 81 TABLET ORAL at 08:44

## 2019-05-21 RX ADMIN — CLOPIDOGREL BISULFATE 75 MG: 75 TABLET ORAL at 08:44

## 2019-05-21 RX ADMIN — Medication 10 ML: at 08:44

## 2019-05-21 RX ADMIN — PENTOXIFYLLINE 400 MG: 400 TABLET, EXTENDED RELEASE ORAL at 08:44

## 2019-05-21 RX ADMIN — PENTOXIFYLLINE 400 MG: 400 TABLET, EXTENDED RELEASE ORAL at 12:42

## 2019-05-21 RX ADMIN — CARVEDILOL 6.25 MG: 6.25 TABLET, FILM COATED ORAL at 08:44

## 2019-05-21 RX ADMIN — DULOXETINE HYDROCHLORIDE 60 MG: 30 CAPSULE, DELAYED RELEASE ORAL at 08:44

## 2019-05-21 RX ADMIN — FLUTICASONE PROPIONATE 2 SPRAY: 50 SPRAY, METERED NASAL at 08:44

## 2019-05-21 RX ADMIN — FUROSEMIDE 40 MG: 40 TABLET ORAL at 08:44

## 2019-05-21 ASSESSMENT — PAIN SCALES - GENERAL
PAINLEVEL_OUTOF10: 0

## 2019-05-21 NOTE — PROGRESS NOTES
Cardiology Progress Note Kimberley Pearce MD      Patient:  Marcelino Gautam  [id number]    Patient Active Problem List    Diagnosis Date Noted    NSTEMI (non-ST elevated myocardial infarction) ([facility] 05/20/2019     Priority: Low    Acute on chronic systolic CHF (congestive heart failure) ([facility] 05/20/2019     Priority: Low    Elevated troponin 05/19/2019     Priority: Low    Acute decompensated heart failure ([facility] 05/18/2019     Priority: Low    Acute pain of right shoulder 02/21/2019     Priority: Low    Hearing aid worn 10/03/2017     Priority: Low     Overview Note:     left      Idiopathic peripheral neuropathy 12/14/2016     Priority: Low    Benign nodular prostatic hyperplasia without lower urinary tract symptoms 12/09/2016     Priority: Low    Peripheral vascular disease ([facility] 12/09/2016     Priority: Low     Overview Note:     Dorsalis pedis pulses slightly diminished on the right to 1+ where is 2+ on the left. Posterior tibial pulses are +2 and symmetrical bilaterally.  Chronic ulcer of right great toe ([facility] 12/09/2016     Priority: Low    Tinea pedis of left foot 12/09/2016     Priority: Low     Overview Note:     4/5 interspace.       Neuropathy 03/15/2016     Priority: Low    Gout 09/17/2014     Priority: Low    Anxiety 09/17/2014     Priority: Low    Mixed hyperlipidemia 09/17/2014     Priority: Low       Admit Date:  5/18/2019    Admission Problem List: Present on Admission:   Acute decompensated heart failure ([facility]   Peripheral vascular disease ([facility]   Elevated troponin   NSTEMI (non-ST elevated myocardial infarction) ([facility]   Acute on chronic systolic CHF (congestive heart failure) ([facility]      Cardiac Specific Data:  Specialty Problems        Cardiology Problems    Peripheral vascular disease ([facility] [facility]        * (Principal) Acute decompensated heart failure (HCC)        Acute on chronic systolic CHF (congestive heart failure) ([address]        NSTEMI (non-ST elevated myocardial infarction) ([address] capsule by mouth daily 2/18/19  Yes Benjamin Hedrick MD   sildenafil (VIAGRA) 100 MG tablet Take 1 tablet by mouth as needed for Erectile Dysfunction 1/16/19  Yes Benjamin Hedrick MD   pentoxifylline (TRENTAL) 400 MG extended release tablet TAKE 1 TABLET BY MOUTH 3 TIMES DAILY (WITH MEALS) 6/1/18  Yes Benjamin Hedrick MD   Omega-3 Fatty Acids (FISH OIL PO) Take by mouth daily   Yes Historical Provider, MD   Multiple Vitamins-Minerals (ONE DAILY MULTIVITAMIN MEN PO) Take by mouth daily   Yes Historical Provider, MD   ibuprofen (ADVIL;MOTRIN) 800 MG tablet Take 800 mg by mouth every 6 hours as needed for Pain. Yes Historical Provider, MD   SYRINGE-NEEDLE, DISP, 3 ML 22G X 1-1/2\" 3 ML MISC 1 each by Does not apply route every 14 days For testosterone injections 3/18/19   Benjamin Hedrick MD        sacubitril-valsartan  1 tablet Oral BID    sodium chloride flush  10 mL Intravenous 2 times per day    clopidogrel  75 mg Oral Daily    aspirin  81 mg Oral Daily    isosorbide mononitrate  30 mg Oral Daily    carvedilol  6.25 mg Oral BID WC    atorvastatin  40 mg Oral Nightly    furosemide  40 mg Oral BID    DULoxetine  60 mg Oral Daily    fluticasone  2 spray Each Nare Daily    pentoxifylline  400 mg Oral TID WC    ipratropium-albuterol  1 ampule Inhalation Once       TELEMETRY: Sinus     Physical Exam:      Physical Exam    No JVD  No edema  No carotid bruits  S1 and S2 of normal intensity, no significant systolic or diastolic murmurs appreciated.  No gallop noted  Good air entry bilaterally with no crepitations or wheezes  Abdomen is soft and nontender with no palpable organomegaly, no abnormal pulsations are felt, no bruits are audible  Neurological status is intact  No deformities        Lab Data:  CBC:   Recent Labs     05/19/19  2235 05/20/19  0357 05/21/19  0427   WBC 11.8* 11.0* 11.0*   HGB 13.8* 13.6* 12.8*   HCT 42.0 41.5* 38.5*   MCV 99.3* 99.3* 96.3*    242 215     BMP:   Recent Labs tissues: The osseous structures of the thorax and surrounding soft tissues demonstrate no acute process. Upper abdomen: The imaged portion of the upper abdomen demonstrates no acute process. 1. #1 there is no evidence of embolic disease. 2. Patchy airspace filling infiltrates are noted bilaterally small pleural effusions although this could be pneumonia most likely this is congestive failure and pulmonary edema. Signed by Dr Jesus Stanley on 5/18/2019 7:04 PM    Vl Lower Extremity Arterial Segmental Pressures W Ppg    Result Date: 5/19/2019  Vascular Lower Arterial Plethysmography Procedure  Demographics   Patient Name     Zoe Dietrich  Age                    68   Patient Number   [id number]      Gender                 Male   Visit Number     [id number]   Interpreting Physician Frederick Roberto MD   Date of Birth    1950  Referring Physician    Karla Covarrubias MD   Accession Number [id number]   [address], RVT  Procedure Type of Study:   Extremities Arteries:Lower Arterial Plethysmography, VASC LOWER EXTREMITY  ART SEGMENTAL PRESSURES W PPG. Indications for Study:PVD. Risk Factors   - The patient's risk factor(s) include: dyslipidemia. - The patient has a former tobacco history. Impression   Based on ankle-brachial indices and doppler waveforms, the patient has  relatively normal flow to the bilateral lower extremity arterial system at  rest.   Signature   ----------------------------------------------------------------  Electronically signed by Frederick Roberto MD(Interpreting  physician) on 05/19/2019 12:53 PM  ----------------------------------------------------------------  Velocities are measured in cm/s ; Diameters are measured in mm Pressures +--------------------------------------++--------+-----+----+--------+-----+ ! ! !Right   ! ! Left!        !     ! +--------------------------------------++--------+-----+----+--------+-----+ ! Location !!Pressure! Ratio! !Pressure! Ratio! +--------------------------------------++--------+-----+----+--------+-----+ ! Upper Thigh                           !!172     !1.58 !    !168     !1.54 ! +--------------------------------------++--------+-----+----+--------+-----+ ! Lower Thigh                           !!170     !1.56 !    !169     !1.55 ! +--------------------------------------++--------+-----+----+--------+-----+ ! Calf                                  !!151     !1.39 !    !157     ! 1.44 ! +--------------------------------------++--------+-----+----+--------+-----+ ! Ankle PT                              !!148     !1.36 !    !155     !1.42 ! +--------------------------------------++--------+-----+----+--------+-----+ ! DP                                    !!139     !1.28 !    !137     !1.26 ! +--------------------------------------++--------+-----+----+--------+-----+ ! Great Toe                             !!130     !1.19 !    !106     !0.97 ! +--------------------------------------++--------+-----+----+--------+-----+   - Brachial Pressure:Right: 109. - HERNÁN:Right: 1.36. Left: 1.42. Plethysmographic Digit Evaluation +---------++--------+-----+---------------++--------+-----+----------------+ ! ! !Right   ! ! Left           !!        !     !                ! +---------++--------+-----+---------------++--------+-----+----------------+ ! Location ! !Pressure! Ratio! PPG Wave Form  ! !Pressure! Ratio! PPG Wave Form   ! +---------++--------+-----+---------------++--------+-----+----------------+ ! Great Toe!!130     !1.19 !               !!106     !0.97 !                ! +---------++--------+-----+---------------++--------+-----+----------------+        Assessment and Plan:       This is a 68y.o. year old male with past medical history of nonhealing ulcer on the right toe presenting with one month of progressive exertional shortness of breath and chest discomfort with cough found to be in decompensated heart failure improving with diuresis ruling in for non-STEMI, finding of severe ischemic cardiomyopathy, ejection fraction 30-35%, cardiac catheterization with occluded thrombotic proximal circumflex and multiple lesions in LAD all treated with drug-eluting stents 5/20/2019.    1. He is doing significantly better postintervention. I have switched his lisinopril to Entresto 49/51 mg twice daily. His blood pressures remain on the lower side though he is asymptomatic. At this time I would continue the same unchanged. 2. If he reports no significant lightheadedness or symptoms this afternoon he may be discharged with follow-up. 3. He will need an echo in 3 months with reevaluation with ejection fraction. If his EF remains below 35% he will benefit from ICD placement. 4. Plavix to remain uninterrupted for 1 year and would continue Plavix lifelong.         Sebastián Hall MD 5/21/2019 11:54 AM

## 2019-05-21 NOTE — PROGRESS NOTES
Pt received medication and is resting. No complaints at this time. Will continue to monitor.  Electronically signed by Sada Bolivar RN on 5/20/2019 at 8:14 PM

## 2019-05-21 NOTE — DISCHARGE SUMMARY
Hospitalist   Discharge Summary    Milo Casas  :    MRN:  [id number]    Admit date:  2019  Discharge date:  2019    Admitting Physician:  Lynne Pagan MD    Advance Directive: Full Code    Consults:cardioloy     Primary Care Physician:  Félix Ocasio MD    Discharge Diagnoses:  Principal Problem:    Acute decompensated heart failure [facility])  Active Problems:    Peripheral vascular disease ([facility]    Elevated troponin    NSTEMI (non-ST elevated myocardial infarction) ([facility]    Acute on chronic systolic CHF (congestive heart failure) ([facility]  Resolved Problems:    * No resolved hospital problems. *      Significant Diagnostic Studies:   Cta Pulmonary W Contrast    Result Date: 2019  EXAMINATION: CTA PULMONARY W CONTRAST 2019 7:01 PM HISTORY: CTA PULMONARY W CONTRAST 2019 4:45 PM HISTORY: Hypoxia COMPARISON: None. DLP: 807 mGy cm TECHNIQUE: Helical tomographic images of the chest were obtained after the administration of intravenous contrast following angiogram protocol. Additionally, 3D MIP reconstructions in the coronal and sagittal planes were provided. FINDINGS:  Pulmonary arteries: There is adequate enhancement of the pulmonary arteries to evaluate for central and segmental pulmonary emboli. There are no filling defects within the main, lobar, segmental or visualized subsegmental pulmonary arteries. The pulmonary vessels are within normal limits for size. Aorta and great vessels: The aorta is not opacified. . The great vessels are normal in appearance. Coronary artery calcifications are visualized. Neck base: The imaged portion of the base of the neck appears unremarkable. Lungs: Bilateral airspace filling infiltrates and bilateral pleural effusions are present. This may be either pulmonary edema or possibly pneumonia. . The trachea and bronchial tree are patent. Heart: Cardiac silhouettes mildly enlarged. There is no pericardial effusion.  Lymph nodes: No pathologically enlarged mediastinal, hilar, or axillary lymph nodes are present. Bones and soft tissues: The osseous structures of the thorax and surrounding soft tissues demonstrate no acute process. Upper abdomen: The imaged portion of the upper abdomen demonstrates no acute process. 1. #1 there is no evidence of embolic disease. 2. Patchy airspace filling infiltrates are noted bilaterally small pleural effusions although this could be pneumonia most likely this is congestive failure and pulmonary edema. Signed by Dr Nikolay Bagley on 5/18/2019 7:04 PM    Vl Lower Extremity Arterial Segmental Pressures W Ppg    Result Date: 5/19/2019  Vascular Lower Arterial Plethysmography Procedure  Demographics   Patient Name     Pj Montanez  Age                    68   Patient Number   [id number]      Gender                 Male   Visit Number     [id number]   Interpreting Physician Real Terrazas MD   Date of Birth    1950  Referring Physician    Garcia Yee MD   Accession Number [id number]   [address] RT, RVT  Procedure Type of Study:   Extremities Arteries:Lower Arterial Plethysmography, VASC LOWER EXTREMITY  ART SEGMENTAL PRESSURES W PPG. Indications for Study:PVD. Risk Factors   - The patient's risk factor(s) include: dyslipidemia. - The patient has a former tobacco history. Impression   Based on ankle-brachial indices and doppler waveforms, the patient has  relatively normal flow to the bilateral lower extremity arterial system at  rest.   Signature   ----------------------------------------------------------------  Electronically signed by Real Terrazas MD(Interpreting  physician) on 05/19/2019 12:53 PM  ----------------------------------------------------------------  Velocities are measured in cm/s ; Diameters are measured in mm Pressures +--------------------------------------++--------+-----+----+--------+-----+ ! ! !Right   ! ! Left!        !     ! +--------------------------------------++--------+-----+----+--------+-----+ ! Location                              ! !Pressure! Ratio! !Pressure! Ratio! +--------------------------------------++--------+-----+----+--------+-----+ ! Upper Thigh                           !!172     !1.58 !    !168     !1.54 ! +--------------------------------------++--------+-----+----+--------+-----+ ! Lower Thigh                           !!170     !1.56 !    !169     !1.55 ! +--------------------------------------++--------+-----+----+--------+-----+ ! Calf                                  !!151     !1.39 !    !157     ! 1.44 ! +--------------------------------------++--------+-----+----+--------+-----+ ! Ankle PT                              !!148     !1.36 !    !155     !1.42 ! +--------------------------------------++--------+-----+----+--------+-----+ ! DP                                    !!139     !1.28 !    !137     !1.26 ! +--------------------------------------++--------+-----+----+--------+-----+ ! Great Toe                             !!130     !1.19 !    !106     !0.97 ! +--------------------------------------++--------+-----+----+--------+-----+   - Brachial Pressure:Right: 109. - HERNÁN:Right: 1.36. Left: 1.42. Plethysmographic Digit Evaluation +---------++--------+-----+---------------++--------+-----+----------------+ ! ! !Right   ! ! Left           !!        !     !                ! +---------++--------+-----+---------------++--------+-----+----------------+ ! Location ! !Pressure! Ratio! PPG Wave Form  ! !Pressure! Ratio! PPG Wave Form   ! +---------++--------+-----+---------------++--------+-----+----------------+ ! Great Toe!!130     !1.19 !               !!106     !0.97 !                ! +---------++--------+-----+---------------++--------+-----+----------------+      Labs:    CBC:   Recent Labs     05/19/19  2235 05/20/19  0357 05/21/19  0427   WBC 11.8* 11.0* 11.0*   HGB 13.8* 13.6* 12.8*    242 215 BMP:    Recent Labs     05/19/19  2235 05/20/19  0357 05/21/19  0427    135* 137   K 4.4 4.3 4.4   CL 98 98 101   CO2 29 28 24   BUN 32* 28* 26*   CREATININE 1.3* 1.0 1.3*   GLUCOSE 121* 108 90     Hepatic:   Recent Labs     05/18/19  1725 05/19/19  2235   AST 20 99*   ALT 28 50*   BILITOT 0.6 0.9   ALKPHOS 75 68     Troponin:   Recent Labs     05/20/19  1053 05/20/19  1633 05/20/19  2250   TROPONINI 4.60* 4.44* 3.31*     BNP: No results for input(s): BNP in the last 72 hours. Lipids:   Recent Labs     05/19/19  2235   CHOL 165   HDL 42*     INR: No results for input(s): INR in the last 72 hours. ABG:   Recent Labs     05/18/19  1754   PHART 7.400   WCS8SOU 36.0   PO2ART 61.0*   NGG7WZD 22.3   P4OIWUGK 91.0   BEART -2.0       30 Day lookback of cultures:    Blood Culture Recent: No results for input(s): BC in the last 720 hours. Gram Stain Recent:   Recent Labs     05/18/19  2340   LABGRAM Few Epithelial Cells  Moderate WBC's (Polymorphonuclear)  Moderate Mixed bacterial morphotypes suggestive of  Normal respiratory yobani       Resp Culture Recent:   Recent Labs     05/18/19  2340   CULTRESP Light growth normal respiratory yobani with*  Light growth  ID and sensitivity to follow       Body Fluid Recent : No results for input(s): BFCX in the last 720 hours. MRSA Recent : No results for input(s): [address] in the last 720 hours. Urine Culture Recent : No results for input(s): LABURIN in the last 720 hours. Organism Recent :   Recent Labs     05/18/19  2340   ORG Gram negative [facility] Course:  Admitted for cp elevated troponin, had cath and PCI 1. Non-STEMI presentation with heart failure, severe ischemic cardiomyopathy, ejection fraction 30-35% with cardiac catheterization 5/20/2019 showing occluded thrombotic proximal circumflex with severe multiple LAD lesions status post PCI to proximal circumflex, proximal, mid and distal LAD.   2. Chronic nonhealing right toe ulcer with likely peripheral arterial disease        Physical Exam:    Vitals: BP (!) 85/55   Pulse 84   Temp 96.8 °F (36 °C) (Temporal)   Resp 18   Ht 6' 3\" (1.905 m)   Wt 265 lb 6.4 oz (120.4 kg)   SpO2 97%   BMI 33.17 kg/m²    General appearance: alert and cooperative with exam  HEENT: atraumatic, eyes with clear conjunctiva and normal lids  Lungs: no increased work of breathing, diminished breath sounds bilaterally  Heart: S1, S2 normal  Abdomen: soft, non-tender; bowel sounds normal; no masses,  no organomegaly  Extremities:atraumatic, no cyanosis no edema no calf tenderness   Neurologic:non focal    Skin: no rashes, nodules.     Discharge Medications:       Oleg [address] [place] [address] Medication Instructions [facility]:[id number]    Printed on:05/21/19 2239   Medication Information                      albuterol sulfate HFA (PROVENTIL HFA) 108 (90 Base) MCG/ACT inhaler  Inhale 2 puffs into the lungs every 6 hours as needed for Wheezing             aspirin 81 MG chewable tablet  Take 1 tablet by mouth daily             atorvastatin (LIPITOR) 40 MG tablet  Take 1 tablet by mouth nightly             azithromycin (ZITHROMAX) 250 MG tablet  Take 1 tablet by mouth See Admin Instructions for 5 days 500mg on day 1 followed by 250mg on days 2 - 5             carvedilol (COREG) 6.25 MG tablet  Take 1 tablet by mouth 2 times daily (with meals)             clopidogrel (PLAVIX) 75 MG tablet  Take 1 tablet by mouth daily             DULoxetine (CYMBALTA) 60 MG extended release capsule  Take 1 capsule by mouth daily             fluticasone (FLONASE) 50 MCG/ACT nasal spray  2 sprays by Each Nare route daily 1 Spray in each nostril             fluticasone (FLONASE) 50 MCG/ACT nasal spray  2 sprays by Each Nare route daily             furosemide (LASIX) 40 MG tablet  Take 1 tablet by mouth 2 times daily             isosorbide mononitrate (IMDUR) 30 MG extended release tablet  Take 1 tablet by mouth daily             Multiple Vitamins-Minerals (ONE DAILY MULTIVITAMIN MEN PO)  Take by mouth daily             nitroGLYCERIN (NITROSTAT) 0.4 MG SL tablet  up to max of 3 total doses. If no relief after 1 dose, call 911. Omega-3 Fatty Acids (FISH OIL PO)  Take by mouth daily             pentoxifylline (TRENTAL) 400 MG extended release tablet  TAKE 1 TABLET BY MOUTH 3 TIMES DAILY (WITH MEALS)             predniSONE (DELTASONE) 20 MG tablet  Take 2 tablets by mouth daily for 4 days, THEN 1 tablet daily for 4 days, THEN 0.5 tablets daily for 4 days. sacubitril-valsartan (ENTRESTO) 49-51 MG per tablet  Take 1 tablet by mouth 2 times daily             sildenafil (VIAGRA) 100 MG tablet  Take 1 tablet by mouth as needed for Erectile Dysfunction                 Discharge Instructions: Follow up with Sravanthi Fuentes MD in 7 days. Take medications as directed. Resume activity as tolerated. Diet: DIET CARDIAC; No Caffeine     Disposition: Patient is medically stable and will be discharged home    Time spent on discharge 38minutes. Signed:   Zelalem Lang MD  Hospitalist service  5/21/2019  1:32 PM

## 2019-05-22 ENCOUNTER — TELEPHONE (OUTPATIENT)
Dept: PRIMARY CARE CLINIC | Age: 69
End: 2019-05-22

## 2019-05-22 LAB
CULTURE, RESPIRATORY: ABNORMAL
CULTURE, RESPIRATORY: ABNORMAL
GRAM STAIN RESULT: ABNORMAL
ORGANISM: ABNORMAL

## 2019-05-22 NOTE — CONSULTS
Patient was discharged prior to MI/PTCA/STENT TEACHING being conducted. Cardiac Rehab education packet was sent to the patient's address on record. Handouts included were titled; \"Home Instructions Following a Cardiac Event\", \"Cardiac Home Exercise Program - Phase I\", \"Risk Factors for Heart Disease and Stroke\" and \"Cardiac Diet/Low Cholesterol\". Patient was instructed to call [facility] to enroll in Phase 2 Outpatient Cardiac Rehab.

## 2019-05-22 NOTE — TELEPHONE ENCOUNTER
[facility] Transitions Initial Follow Up Call    Outreach made within 2 business days of discharge: Yes Voicemail full    Patient: Milo Casas Patient : 1950   MRN: [id number]  Reason for Admission: CHF  Admit date 2019  Discharge Date: 19       Spoke with: No one    Discharge department/facility: [facility]      Attempted to call patient for second time, voicemail box is full.       Scheduled appointment with PCP within 7-14 days    Follow Up  Future Appointments   Date Time Provider Denise Betancur   2019 11:00 AM Félix Ocasio MD [facility] [address] [place]   2019  1:15 PM CHRIS Arreola LPS Cardio [facility] LPN

## 2019-05-28 ENCOUNTER — OFFICE VISIT (OUTPATIENT)
Dept: FAMILY MEDICINE CLINIC | Age: 69
End: 2019-05-28
Payer: COMMERCIAL

## 2019-05-28 VITALS
RESPIRATION RATE: 16 BRPM | BODY MASS INDEX: 32.5 KG/M2 | DIASTOLIC BLOOD PRESSURE: 56 MMHG | HEART RATE: 79 BPM | SYSTOLIC BLOOD PRESSURE: 84 MMHG | WEIGHT: 260 LBS | OXYGEN SATURATION: 98 % | TEMPERATURE: 97.9 F

## 2019-05-28 DIAGNOSIS — I95.9 HYPOTENSION, UNSPECIFIED HYPOTENSION TYPE: ICD-10-CM

## 2019-05-28 DIAGNOSIS — Z86.79 HISTORY OF ACUTE CONGESTIVE HEART FAILURE: Primary | ICD-10-CM

## 2019-05-28 DIAGNOSIS — Z79.899 MEDICATION MANAGEMENT: ICD-10-CM

## 2019-05-28 LAB
ANION GAP SERPL CALCULATED.3IONS-SCNC: 13 MMOL/L (ref 7–19)
BUN BLDV-MCNC: 45 MG/DL (ref 8–23)
CALCIUM SERPL-MCNC: 9.4 MG/DL (ref 8.8–10.2)
CHLORIDE BLD-SCNC: 98 MMOL/L (ref 98–111)
CO2: 25 MMOL/L (ref 22–29)
CREAT SERPL-MCNC: 2 MG/DL (ref 0.5–1.2)
GFR NON-AFRICAN AMERICAN: 33
GLUCOSE BLD-MCNC: 107 MG/DL (ref 74–109)
HCT VFR BLD CALC: 37.5 % (ref 42–52)
HEMOGLOBIN: 12.3 G/DL (ref 14–18)
MCH RBC QN AUTO: 32.7 PG (ref 27–31)
MCHC RBC AUTO-ENTMCNC: 32.8 G/DL (ref 33–37)
MCV RBC AUTO: 99.7 FL (ref 80–94)
PDW BLD-RTO: 14.2 % (ref 11.5–14.5)
PLATELET # BLD: 280 K/UL (ref 130–400)
PMV BLD AUTO: 11.1 FL (ref 9.4–12.4)
POTASSIUM SERPL-SCNC: 5.9 MMOL/L (ref 3.5–5)
RBC # BLD: 3.76 M/UL (ref 4.7–6.1)
SODIUM BLD-SCNC: 136 MMOL/L (ref 136–145)
WBC # BLD: 11.1 K/UL (ref 4.8–10.8)

## 2019-05-28 PROCEDURE — 1111F DSCHRG MED/CURRENT MED MERGE: CPT | Performed by: FAMILY MEDICINE

## 2019-05-28 PROCEDURE — 93000 ELECTROCARDIOGRAM COMPLETE: CPT | Performed by: FAMILY MEDICINE

## 2019-05-28 PROCEDURE — 99496 TRANSJ CARE MGMT HIGH F2F 7D: CPT | Performed by: FAMILY MEDICINE

## 2019-05-28 ASSESSMENT — ENCOUNTER SYMPTOMS
DIARRHEA: 0
NAUSEA: 0
EYES NEGATIVE: 1
CONSTIPATION: 0
WHEEZING: 0
SHORTNESS OF BREATH: 1
VOMITING: 0
COUGH: 1
CHEST TIGHTNESS: 0
BLOOD IN STOOL: 0

## 2019-05-28 NOTE — PROGRESS NOTES
Post-Discharge Transitional Care Management Services or Hospital Follow Up  This patient was seen here in [facility] outpatient clinic on 5/16/19 by Dr. Joseluis Hawley. He had had some cough and some shortness of breath. The patient was felt to have acute bronchitis of an unspecified organism and bronchospasm. He was put on Z-Len and prednisone Dosepak. Also he was given Proventil HFA inhaler. Flonase nasal spray was used as well. When the shortness of breath and the coughing persisted, the patient was then taken to the emergency room at Hospital. He was felt to have evidence of congestive heart failure. He did undergo CTA which showed no evidence of embolic disease. Patchy airspace filling infiltrates were noted bilaterally and small pleural effusions with the diagnosis most likely felt to be congestive failure and pulmonary edema. The patient was recommended to be hospitalized for further evaluation and management. He was seen in consultation by Dr. Jim Friend and did undergo coronary angiogram. He was found to have occlusive disease. It is my understanding that he did have 4 stents placed, one in the left circumflex, a stent in the distal LAD, the mid LAD, and another elevated the patient then did have another medication added to his regimen. This included Imdur 30 mg by mouth daily, he was placed on Coreg 6.25 one by mouth twice twice a day, and Entresto 49/51  at  a frequency of twice a day was added. The patient was to remain on Lasix 40 mg twice daily and he was on Plavix 75 mg by mouth daily. Ventolin HFA HandiHaler was recommended at 2 puffs every 6 hours. The patient presents today in follow-up of his hospitalization. He is breathing better though he states he still coughs some. His blood pressure was noted to be quite low today and 84/56. He does have symptoms of orthostasis as he notes position changes causes pressure to bottom. His wife who is a LPN does monitor his pressure for him at home.  I am going to have him cut back on Coreg from 6.25-1/2 of that or 3.125 by mouth twice a day. Other meds will remain unchanged at this time. Aaron Lee, his wife, will make a blood pressure journal for us and we will check him back in 2 weeks' time. He will remain on an 81 mg aspirin daily. He does have nitroglycerin available at home though he denies having any chest pain at this time. Also as noted above he will remain on Plavix 75 mg by mouth daily. The patient is on Trental 400 mg 3 times a day for peripheral vascular disease and he takes fish oil daily as well. He does have a history of depression. He is on Cymbalta 60 mg by mouth daily which will be continued. For his history of hyperlipidemia, he is on Lipitor 40 mg by mouth daily at bedtime and as noted above he is on an 81 mg aspirin daily as well. Carmen Villsaenor   YOB: 1950    Date of Office Visit:  5/28/2019  Date of Hospital Admission: 5/18/19  Date of Hospital Discharge: 5/21/19  Readmission Risk Score(high >=14%.  Medium >=10%):Readmission Risk Score: 12      Care management risk score Rising risk (score 2-5) and Complex Care (Scores >=6): 1     Non face to face  following discharge, date last encounter closed (first attempt may have been earlier): 5/22/2019 11:48 AM 5/22/2019 11:48 AM    Call initiated 2 business days of discharge: Yes     Patient Active Problem List   Diagnosis    Gout    Anxiety    Mixed hyperlipidemia    Neuropathy    Benign nodular prostatic hyperplasia without lower urinary tract symptoms    Peripheral vascular disease ([address]    Chronic ulcer of right great toe ([address]    Tinea pedis of left foot    Idiopathic peripheral neuropathy    Hearing aid worn    Acute pain of right shoulder    New onset of congestive heart failure (HCC)    Elevated troponin    NSTEMI (non-ST elevated myocardial infarction) ([address]    Acute on chronic systolic CHF (congestive heart failure) ([address]    History of acute congestive heart failure Allergies   Allergen Reactions    Penicillins      Unknown what it does       Medications listed as ordered at the time of discharge from [facility] Medication Instructions JEISON:    Printed on:05/28/19 1143   Medication Information                      albuterol sulfate HFA (PROVENTIL HFA) 108 (90 Base) MCG/ACT inhaler  Inhale 2 puffs into the lungs every 6 hours as needed for Wheezing             aspirin 81 MG chewable tablet  Take 1 tablet by mouth daily             atorvastatin (LIPITOR) 40 MG tablet  Take 1 tablet by mouth nightly             carvedilol (COREG) 6.25 MG tablet  Take 1 tablet by mouth 2 times daily (with meals)             clopidogrel (PLAVIX) 75 MG tablet  Take 1 tablet by mouth daily             DULoxetine (CYMBALTA) 60 MG extended release capsule  Take 1 capsule by mouth daily             fluticasone (FLONASE) 50 MCG/ACT nasal spray  2 sprays by Each Nare route daily             furosemide (LASIX) 40 MG tablet  Take 1 tablet by mouth 2 times daily             isosorbide mononitrate (IMDUR) 30 MG extended release tablet  Take 1 tablet by mouth daily             Multiple Vitamins-Minerals (ONE DAILY MULTIVITAMIN MEN PO)  Take by mouth daily             nitroGLYCERIN (NITROSTAT) 0.4 MG SL tablet  up to max of 3 total doses. If no relief after 1 dose, call 911.              Omega-3 Fatty Acids (FISH OIL PO)  Take by mouth daily             pentoxifylline (TRENTAL) 400 MG extended release tablet  TAKE 1 TABLET BY MOUTH 3 TIMES DAILY (WITH MEALS)             sacubitril-valsartan (ENTRESTO) 49-51 MG per tablet  Take 1 tablet by mouth 2 times daily             sildenafil (VIAGRA) 100 MG tablet  Take 1 tablet by mouth as needed for Erectile Dysfunction                   Medications marked \"taking\" at this time  Outpatient Medications Marked as Taking for the 5/28/19 encounter (Office Visit) with Darshana Rocha MD   Medication Sig Dispense Refill    aspirin 81 MG chewable tablet Take 1 tablet by mouth daily 30 tablet 3    isosorbide mononitrate (IMDUR) 30 MG extended release tablet Take 1 tablet by mouth daily 30 tablet 3    nitroGLYCERIN (NITROSTAT) 0.4 MG SL tablet up to max of 3 total doses. If no relief after 1 dose, call 911. 25 tablet 3    atorvastatin (LIPITOR) 40 MG tablet Take 1 tablet by mouth nightly 30 tablet 2    carvedilol (COREG) 6.25 MG tablet Take 1 tablet by mouth 2 times daily (with meals) 60 tablet 3    sacubitril-valsartan (ENTRESTO) 49-51 MG per tablet Take 1 tablet by mouth 2 times daily 60 tablet 0    fluticasone (FLONASE) 50 MCG/ACT nasal spray 2 sprays by Each Nare route daily 1 Bottle 3    furosemide (LASIX) 40 MG tablet Take 1 tablet by mouth 2 times daily 60 tablet 3    clopidogrel (PLAVIX) 75 MG tablet Take 1 tablet by mouth daily 30 tablet 3    albuterol sulfate HFA (PROVENTIL HFA) 108 (90 Base) MCG/ACT inhaler Inhale 2 puffs into the lungs every 6 hours as needed for Wheezing 1 Inhaler 3    DULoxetine (CYMBALTA) 60 MG extended release capsule Take 1 capsule by mouth daily 90 capsule 3    pentoxifylline (TRENTAL) 400 MG extended release tablet TAKE 1 TABLET BY MOUTH 3 TIMES DAILY (WITH MEALS) 270 tablet 1    Omega-3 Fatty Acids (FISH OIL PO) Take by mouth daily      Multiple Vitamins-Minerals (ONE DAILY MULTIVITAMIN MEN PO) Take by mouth daily          Medications patient taking as of now reconciled against medications ordered at time of hospital discharge: Yes    Chief Complaint   Patient presents with    Follow-Up from Hospital       HPI    Inpatient course: Discharge summary reviewed- see chart. Interval history/Current status: See above    Review of Systems   Constitutional: Negative. HENT: Negative. Eyes: Negative. Respiratory: Positive for cough (patient has had some dry hacking cough.  He is on a medication Entresto that could contribute to this but he was having some even before his diagnosis of congestive heart failure was tenderness. There is no rebound and no guarding. Musculoskeletal: Normal range of motion. He exhibits no edema, tenderness or deformity. Neurological: He is alert and oriented to person, place, and time. He has normal strength and normal reflexes. Skin: Skin is warm, dry and intact. Psychiatric: He has a normal mood and affect. His speech is normal and behavior is normal. Judgment and thought content normal. Cognition and memory are normal.   Nursing note and vitals reviewed.           Assessment/Plan:        Medical Decision Making: high complexity

## 2019-05-30 ENCOUNTER — TELEPHONE (OUTPATIENT)
Dept: FAMILY MEDICINE CLINIC | Age: 69
End: 2019-05-30

## 2019-05-30 DIAGNOSIS — E87.5 HYPERKALEMIA: Primary | ICD-10-CM

## 2019-05-30 DIAGNOSIS — N28.9 RENAL INSUFFICIENCY: ICD-10-CM

## 2019-06-11 ENCOUNTER — HOSPITAL ENCOUNTER (OUTPATIENT)
Dept: GENERAL RADIOLOGY | Age: 69
Discharge: HOME | End: 2019-06-11
Payer: COMMERCIAL

## 2019-06-11 ENCOUNTER — OFFICE VISIT (OUTPATIENT)
Dept: FAMILY MEDICINE CLINIC | Age: 69
End: 2019-06-11
Payer: COMMERCIAL

## 2019-06-11 VITALS
TEMPERATURE: 97.9 F | BODY MASS INDEX: 31.75 KG/M2 | SYSTOLIC BLOOD PRESSURE: 100 MMHG | DIASTOLIC BLOOD PRESSURE: 62 MMHG | RESPIRATION RATE: 14 BRPM | WEIGHT: 254 LBS | OXYGEN SATURATION: 98 % | HEART RATE: 67 BPM

## 2019-06-11 DIAGNOSIS — I95.9 HYPOTENSION, UNSPECIFIED HYPOTENSION TYPE: ICD-10-CM

## 2019-06-11 DIAGNOSIS — Z95.5 STATUS POST CORONARY ARTERY STENT PLACEMENT: ICD-10-CM

## 2019-06-11 DIAGNOSIS — N28.9 RENAL INSUFFICIENCY: ICD-10-CM

## 2019-06-11 DIAGNOSIS — R42 LIGHTHEADED: ICD-10-CM

## 2019-06-11 DIAGNOSIS — G62.9 NEUROPATHY: ICD-10-CM

## 2019-06-11 DIAGNOSIS — G60.9 IDIOPATHIC PERIPHERAL NEUROPATHY: ICD-10-CM

## 2019-06-11 DIAGNOSIS — Z86.79 HISTORY OF ACUTE CONGESTIVE HEART FAILURE: ICD-10-CM

## 2019-06-11 DIAGNOSIS — I73.9 PERIPHERAL VASCULAR DISEASE (HCC): ICD-10-CM

## 2019-06-11 DIAGNOSIS — E87.5 HYPERKALEMIA: ICD-10-CM

## 2019-06-11 DIAGNOSIS — F41.9 ANXIETY: ICD-10-CM

## 2019-06-11 DIAGNOSIS — E78.2 MIXED HYPERLIPIDEMIA: ICD-10-CM

## 2019-06-11 LAB
ANION GAP SERPL CALCULATED.3IONS-SCNC: 12 MMOL/L (ref 7–19)
BUN BLDV-MCNC: 40 MG/DL (ref 8–23)
CALCIUM SERPL-MCNC: 9.5 MG/DL (ref 8.8–10.2)
CHLORIDE BLD-SCNC: 102 MMOL/L (ref 98–111)
CO2: 24 MMOL/L (ref 22–29)
CREAT SERPL-MCNC: 1.3 MG/DL (ref 0.5–1.2)
GFR NON-AFRICAN AMERICAN: 54
GLUCOSE BLD-MCNC: 99 MG/DL (ref 74–109)
HCT VFR BLD CALC: 39.3 % (ref 42–52)
HEMOGLOBIN: 12.4 G/DL (ref 14–18)
MCH RBC QN AUTO: 31.6 PG (ref 27–31)
MCHC RBC AUTO-ENTMCNC: 31.6 G/DL (ref 33–37)
MCV RBC AUTO: 100 FL (ref 80–94)
PDW BLD-RTO: 14.3 % (ref 11.5–14.5)
PLATELET # BLD: 210 K/UL (ref 130–400)
PMV BLD AUTO: 11 FL (ref 9.4–12.4)
POTASSIUM SERPL-SCNC: 5.2 MMOL/L (ref 3.5–5)
RBC # BLD: 3.93 M/UL (ref 4.7–6.1)
SODIUM BLD-SCNC: 138 MMOL/L (ref 136–145)
WBC # BLD: 6.2 K/UL (ref 4.8–10.8)

## 2019-06-11 PROCEDURE — 76706 US ABDL AORTA SCREEN AAA: CPT

## 2019-06-11 PROCEDURE — 99214 OFFICE O/P EST MOD 30 MIN: CPT | Performed by: FAMILY MEDICINE

## 2019-06-11 RX ORDER — CARVEDILOL 3.12 MG/1
3.12 TABLET ORAL 2 TIMES DAILY WITH MEALS
Qty: 90 TABLET | Refills: 1 | Status: SHIPPED | OUTPATIENT
Start: 2019-06-11 | End: 2019-06-12 | Stop reason: SDUPTHER

## 2019-06-11 ASSESSMENT — ENCOUNTER SYMPTOMS
CHEST TIGHTNESS: 0
EYES NEGATIVE: 1
DIARRHEA: 0
VOMITING: 0
WHEEZING: 0
BLOOD IN STOOL: 0
SHORTNESS OF BREATH: 0
CONSTIPATION: 0
NAUSEA: 0
COUGH: 1

## 2019-06-11 NOTE — PROGRESS NOTES
Subjective:      Patient ID: Dick Shepard is a 68 y.o. male. HPI patient does report today in follow-up of exam that was done on 5/28/19 regarding transitional care management. He does state that presently he does notice feeling better somewhat each day. He still describes some episodes where he gets a bit lightheaded. The patient was here today with his wife Heron Mendoza. He did bring in him with him his journal of blood pressure readings. His blood pressures for the most part are too low. Often times his pressure runs 80/50. These are likely causing him to feel lightheaded. Occasionally his pressures are 90/50 or 60. Blood pressure today was noted to be 100/62. I am going to cut back on his medication just a bit. He is on multiple medicines that affect blood pressure. This would include Imdur 30 mg p.o. daily which I will not change. I have already cut back on Coreg 6.25 twice daily down to 3.125 p.o. twice daily which we are sending to his pharmacy for him today. I will not change that further at this time. He is on track Entresto 49/51 at 1 p.o. twice daily. We did examine the pill in the office here with him today and I am going to ask them to break this in half with her pill cutter and use one half of a pill 3 times daily. Hopefully I will be able to cut this back further to one half p.o. twice daily but I am going to do it and no gradual steps. He is maintained on Lasix at 40 mg p.o. twice daily. He does have a history of having stent placement made. He is on Plavix 75 mg p.o. daily which he tolerates well. For peripheral vascular disease he is on Trental 400 mg 3 times daily. The patient has not had screening for abdominal aortic ultrasound so we are going to do a ultrasound of his abdominal aorta for him. By making gradual decreases in his medication regimen in an effort to allow his pressure to normalize further, I am going to have him come back in 2 weeks to monitor the success thereof. They will make a blood pressure journal and bring that in with them when they come as well. It is my understanding that last year he did have screening for colon cancer by FIT. We will provide them with a stool kit today to do this test again. This patient was seen here in [facility] outpatient clinic on 5/16/19 by Dr. Quique Dasilva. He had had some cough and some shortness of breath. The patient was felt to have acute bronchitis of an unspecified organism and bronchospasm. He was put on Z-Len and prednisone Dosepak. Also he was given Proventil HFA inhaler. Flonase nasal spray was used as well. When the shortness of breath and the coughing persisted, the patient was then taken to the emergency room at Hospital. He was felt to have evidence of congestive heart failure. He did undergo CTA which showed no evidence of embolic disease. Patchy airspace filling infiltrates were noted bilaterally and small pleural effusions with the diagnosis most likely felt to be congestive failure and pulmonary edema. The patient was recommended to be hospitalized for further evaluation and management. He was seen in consultation by Dr. Shobha Minaya and did undergo coronary angiogram. He was found to have occlusive disease. It is my understanding that he did have 4 stents placed, one in the left circumflex, a stent in the distal LAD, the mid LAD, and another in the LAD as well and then did have another medication added to his regimen. This included Imdur 30 mg by mouth daily, he was placed on Coreg 6.25 one by mouth twice twice a day, and Entresto 49/51  at  a frequency of twice a day was added. As noted above, I have now cut back on both of these medications, and that I have cut Coreg to 3.125 p.o. twice daily and I have decreased Entresto to using one half of the size of the pill that he is on 3 times daily or 75% of the amount that he previously was taking.   Ventolin HFA HandiHaler was recommended at 2 puffs every 6 hours.   He will remain on an 81 mg aspirin daily. He does have nitroglycerin available at home though he denies having any chest pain at this time. Also as noted above he will remain on Plavix 75 mg by mouth daily. The patient is on Trental 400 mg 3 times a day for peripheral vascular disease and he takes fish oil daily as well.     He does have a history of depression. He is on Cymbalta 60 mg by mouth daily which will be continued.     For his history of hyperlipidemia, he is on Lipitor 40 mg by mouth daily at bedtime and as noted above he is on an 81 mg aspirin daily as well.       Review of Systems   Constitutional: Negative. HENT: Negative. Eyes: Negative. Respiratory: Positive for cough. Negative for chest tightness, shortness of breath and wheezing. He does report having some cough though it has improved considerably. Cardiovascular: Negative for chest pain, palpitations and leg swelling. No significant pedal edema is currently noted at this time. His exam negative   Gastrointestinal: Negative for blood in stool, constipation, diarrhea, nausea and vomiting. Genitourinary: Negative for hematuria. Skin: Negative. Neurological: Negative. Psychiatric/Behavioral: Negative. Objective:   Physical Exam   Constitutional: He is oriented to person, place, and time. He appears well-developed and well-nourished. Weight remains in excess of ideal at 254 with BMI noted at 31.75. HENT:   Head: Normocephalic and atraumatic. Right Ear: External ear normal.   Left Ear: External ear normal.   Nose: Nose normal.   Mouth/Throat: Oropharynx is clear and moist.   Eyes: Pupils are equal, round, and reactive to light. Conjunctivae and EOM are normal.   Neck: Normal range of motion. Neck supple. No thyromegaly present. Cardiovascular: Normal rate, regular rhythm, S1 normal, S2 normal, normal heart sounds, intact distal pulses and normal pulses. Exam reveals no gallop and no friction rub.    No murmur heard.  Pulmonary/Chest: Effort normal and breath sounds normal. No stridor. No apnea. No respiratory distress. He has no wheezes. He has no rales. Abdominal: Soft. Normal appearance. He exhibits no distension and no mass. There is no tenderness. There is no guarding. Musculoskeletal: Normal range of motion. He exhibits no edema, tenderness or deformity. Neurological: He is alert and oriented to person, place, and time. He has normal strength and normal reflexes. Skin: Skin is warm, dry and intact. Psychiatric: He has a normal mood and affect. His speech is normal and behavior is normal. Judgment and thought content normal. Cognition and memory are normal.   Nursing note and vitals reviewed. /62 (Site: Right Upper Arm, Position: Sitting, Cuff Size: Large Adult)   Pulse 67   Temp 97.9 °F (36.6 °C) (Temporal)   Resp 14   Wt 254 lb (115.2 kg)   SpO2 98%   BMI 31.75 kg/m²   Assessment:        Diagnosis Orders   1. Status post coronary artery stent placement     2. History of acute congestive heart failure  sacubitril-valsartan (ENTRESTO) 49-51 MG per tablet   3. Lightheaded     4. Hypotension, unspecified hypotension type  US SCREENING FOR AAA    carvedilol (COREG) 3.125 MG tablet    sacubitril-valsartan (ENTRESTO) 49-51 MG per tablet   5. Neuropathy     6. Idiopathic peripheral neuropathy  sacubitril-valsartan (ENTRESTO) 49-51 MG per tablet   7. Mixed hyperlipidemia     8. Anxiety     9. Peripheral vascular disease ([facility] [address]  US SCREENING FOR AAA    carvedilol (COREG) 3.125 MG tablet               Plan:       I am having Mr. Renzo Abdullahi maintain his :   Outpatient Medications Marked as Taking for the 6/11/19 encounter (Office Visit) with Robin Ernst MD   Medication Sig Dispense Refill    carvedilol (COREG) 3.125 MG tablet Take 1 tablet by mouth 2 times daily (with meals) 90 tablet 1    sacubitril-valsartan (ENTRESTO) 49-51 MG per tablet Take 1/2 tablet three times daily.  60 tablet 2    aspirin 81 MG chewable tablet Take 1 tablet by mouth daily 30 tablet 3    isosorbide mononitrate (IMDUR) 30 MG extended release tablet Take 1 tablet by mouth daily 30 tablet 3    nitroGLYCERIN (NITROSTAT) 0.4 MG SL tablet up to max of 3 total doses. If no relief after 1 dose, call 911. 25 tablet 3    atorvastatin (LIPITOR) 40 MG tablet Take 1 tablet by mouth nightly 30 tablet 2    fluticasone (FLONASE) 50 MCG/ACT nasal spray 2 sprays by Each Nare route daily 1 Bottle 3    furosemide (LASIX) 40 MG tablet Take 1 tablet by mouth 2 times daily 60 tablet 3    clopidogrel (PLAVIX) 75 MG tablet Take 1 tablet by mouth daily 30 tablet 3    albuterol sulfate HFA (PROVENTIL HFA) 108 (90 Base) MCG/ACT inhaler Inhale 2 puffs into the lungs every 6 hours as needed for Wheezing 1 Inhaler 3    DULoxetine (CYMBALTA) 60 MG extended release capsule Take 1 capsule by mouth daily 90 capsule 3    sildenafil (VIAGRA) 100 MG tablet Take 1 tablet by mouth as needed for Erectile Dysfunction 6 tablet 5    pentoxifylline (TRENTAL) 400 MG extended release tablet TAKE 1 TABLET BY MOUTH 3 TIMES DAILY (WITH MEALS) 270 tablet 1    Omega-3 Fatty Acids (FISH OIL PO) Take by mouth daily      Multiple Vitamins-Minerals (ONE DAILY MULTIVITAMIN MEN PO) Take by mouth daily     ,  Medications Discontinued During This Encounter   Medication Reason    carvedilol (COREG) 6.25 MG tablet DOSE ADJUSTMENT    sacubitril-valsartan (ENTRESTO) 49-51 MG per tablet DOSE ADJUSTMENT    I have refilled the following medication today:   Requested Prescriptions     Signed Prescriptions Disp Refills    carvedilol (COREG) 3.125 MG tablet 90 tablet 1     Sig: Take 1 tablet by mouth 2 times daily (with meals)    sacubitril-valsartan (ENTRESTO) 49-51 MG per tablet 60 tablet 2     Sig: Take 1/2 tablet three times daily.    .       Orders Placed This Encounter   Procedures    US SCREENING FOR AAA     Standing Status:   Future     Number of Occurrences:   1

## 2019-06-12 ENCOUNTER — TELEPHONE (OUTPATIENT)
Dept: FAMILY MEDICINE CLINIC | Age: 69
End: 2019-06-12

## 2019-06-12 DIAGNOSIS — I73.9 PERIPHERAL VASCULAR DISEASE (HCC): ICD-10-CM

## 2019-06-12 DIAGNOSIS — I95.9 HYPOTENSION, UNSPECIFIED HYPOTENSION TYPE: ICD-10-CM

## 2019-06-12 RX ORDER — CARVEDILOL 3.12 MG/1
3.12 TABLET ORAL 2 TIMES DAILY WITH MEALS
Qty: 180 TABLET | Refills: 1 | Status: SHIPPED | OUTPATIENT
Start: 2019-06-12 | End: 2019-07-23 | Stop reason: DRUGHIGH

## 2019-06-12 NOTE — TELEPHONE ENCOUNTER
Patient spouse had left message that Coreg needed to go to [facility] sent. Message left with spouse that would send script.

## 2019-06-17 ENCOUNTER — TELEPHONE (OUTPATIENT)
Dept: FAMILY MEDICINE CLINIC | Age: 69
End: 2019-06-17

## 2019-06-17 NOTE — TELEPHONE ENCOUNTER
Spoke with [facility] to verify dose of Entresto. Patient to take 1/2 tab TID 90 day supply with 3 refills called to [facility]. Dr. Erika Barone spoke with [facility] and gave directions.    Ref #[id number]

## 2019-06-18 ENCOUNTER — TELEPHONE (OUTPATIENT)
Dept: FAMILY MEDICINE CLINIC | Age: 69
End: 2019-06-18

## 2019-06-18 DIAGNOSIS — I71.40 ABDOMINAL AORTIC ANEURYSM (AAA) WITHOUT RUPTURE: Primary | ICD-10-CM

## 2019-06-20 ENCOUNTER — OFFICE VISIT (OUTPATIENT)
Dept: VASCULAR SURGERY | Age: 69
End: 2019-06-20
Payer: COMMERCIAL

## 2019-06-20 ENCOUNTER — HOSPITAL ENCOUNTER (OUTPATIENT)
Dept: GENERAL RADIOLOGY | Age: 69
Discharge: HOME | End: 2019-06-20
Payer: COMMERCIAL

## 2019-06-20 VITALS
SYSTOLIC BLOOD PRESSURE: 96 MMHG | TEMPERATURE: 98.6 F | RESPIRATION RATE: 18 BRPM | HEART RATE: 92 BPM | OXYGEN SATURATION: 94 % | DIASTOLIC BLOOD PRESSURE: 63 MMHG

## 2019-06-20 DIAGNOSIS — S91.109A OPEN WOUND OF TOE, INITIAL ENCOUNTER: ICD-10-CM

## 2019-06-20 DIAGNOSIS — S91.109A OPEN WOUND OF TOE, INITIAL ENCOUNTER: Primary | ICD-10-CM

## 2019-06-20 DIAGNOSIS — I71.40 AAA (ABDOMINAL AORTIC ANEURYSM) WITHOUT RUPTURE: ICD-10-CM

## 2019-06-20 PROBLEM — R79.89 ELEVATED TROPONIN: Status: RESOLVED | Noted: 2019-05-19 | Resolved: 2019-06-20

## 2019-06-20 PROBLEM — R77.8 ELEVATED TROPONIN: Status: RESOLVED | Noted: 2019-05-19 | Resolved: 2019-06-20

## 2019-06-20 PROCEDURE — 73630 X-RAY EXAM OF FOOT: CPT

## 2019-06-20 PROCEDURE — 99203 OFFICE O/P NEW LOW 30 MIN: CPT | Performed by: NURSE PRACTITIONER

## 2019-06-20 NOTE — PROGRESS NOTES
(COREG) 3.125 MG tablet Take 1 tablet by mouth 2 times daily (with meals) 180 tablet 1    sacubitril-valsartan (ENTRESTO) 49-51 MG per tablet Take 1/2 tablet three times daily. 60 tablet 2    aspirin 81 MG chewable tablet Take 1 tablet by mouth daily 30 tablet 3    isosorbide mononitrate (IMDUR) 30 MG extended release tablet Take 1 tablet by mouth daily 30 tablet 3    nitroGLYCERIN (NITROSTAT) 0.4 MG SL tablet up to max of 3 total doses. If no relief after 1 dose, call 911. 25 tablet 3    atorvastatin (LIPITOR) 40 MG tablet Take 1 tablet by mouth nightly 30 tablet 2    fluticasone (FLONASE) 50 MCG/ACT nasal spray 2 sprays by Each Nare route daily 1 Bottle 3    furosemide (LASIX) 40 MG tablet Take 1 tablet by mouth 2 times daily 60 tablet 3    clopidogrel (PLAVIX) 75 MG tablet Take 1 tablet by mouth daily 30 tablet 3    albuterol sulfate HFA (PROVENTIL HFA) 108 (90 Base) MCG/ACT inhaler Inhale 2 puffs into the lungs every 6 hours as needed for Wheezing 1 Inhaler 3    DULoxetine (CYMBALTA) 60 MG extended release capsule Take 1 capsule by mouth daily 90 capsule 3    sildenafil (VIAGRA) 100 MG tablet Take 1 tablet by mouth as needed for Erectile Dysfunction 6 tablet 5    pentoxifylline (TRENTAL) 400 MG extended release tablet TAKE 1 TABLET BY MOUTH 3 TIMES DAILY (WITH MEALS) 270 tablet 1    Omega-3 Fatty Acids (FISH OIL PO) Take by mouth daily      Multiple Vitamins-Minerals (ONE DAILY MULTIVITAMIN MEN PO) Take by mouth daily       No current facility-administered medications for this visit. Allergies: Penicillins  Past Medical History:   Diagnosis Date    Gout     History of acute congestive heart failure      Past Surgical History:   Procedure Laterality Date    CARDIAC CATHETERIZATION  05/2019    HERNIA REPAIR       History reviewed. No pertinent family history.   Social History     Tobacco Use    Smoking status: Former Smoker     Types: Cigarettes     Last attempt to quit: 5/23/1980     Years since quittin.1    Smokeless tobacco: Never Used    Tobacco comment: quit over 25 yrs ago   Substance Use Topics    Alcohol use: No         Old records have been obtained from the referring providers. These records have been reviewed and summarized. Review of Systems    Constitutional - no significant activity change, appetite change, or unexpected weight change. No fever or chills. No diaphoresis or significant fatigue. HENT - no significant rhinorrhea or epistaxis. No tinnitus or significant hearing loss. Eyes - no sudden vision change or amaurosis. Respiratory - no significant shortness of breath, wheezing, or stridor. No apnea, cough, or chest tightness associated with shortness of breath. Cardiovascular - no chest pain, syncope, or significant dizziness. No palpitations or significant leg swelling. No claudication. Gastrointestinal - has not had abdominal swelling or pain. No blood in stool. No severe constipation, diarrhea, nausea, or vomiting. Genitourinary - No difficulty urinating, dysuria, frequency, or urgency. No flank pain or hematuria. Musculoskeletal - has not back pain, gait disturbance, or myalgia. Skin - no color change, rash, pallor, has wound. Neurologic - no dizziness, facial asymmetry, or light headedness. No seizures. No speech difficulty or lateralizing weakness. Hematologic - no easy bruising or excessive bleeding. Psychiatric - no severe anxiety or nervousness. No confusion. All other review of systems are negative. Physical Exam    BP 96/63 Comment: left  Pulse 92   Temp 98.6 °F (37 °C)   Resp 18   SpO2 94%     Constitutional - well developed, well nourished. No diaphoresis or acute distress. HENT - head normocephalic. Right external ear canal appears normal.  Left external ear canal appears normal.  Septum appears midline. Eyes - conjunctiva normal.  EOMS normal.  No exudate. No icterus.   Neck- ROM appears normal, no tracheal back pain or abdominal pain. This pain can sometimes radiate into the groin or leg. The patient may experience a feeling of impending doom or death. If this occurs they have been insturcted to call 911 and get to the emergency room telling them you have an aneurysm. Patient has voiced understanding.   Xray of foot  Santyl and gauze after soap and water wash twice daily  flexitol to callous  Off loading shoe  Follow up in 2 weeks

## 2019-06-21 ENCOUNTER — TELEPHONE (OUTPATIENT)
Dept: VASCULAR SURGERY | Age: 69
End: 2019-06-21

## 2019-06-21 PROBLEM — I71.40 AAA (ABDOMINAL AORTIC ANEURYSM) WITHOUT RUPTURE (HCC): Status: ACTIVE | Noted: 2019-06-21

## 2019-06-21 NOTE — TELEPHONE ENCOUNTER
----- Message from CHRIS Castañeda sent at 6/21/2019  3:05 PM CDT -----  Please let them know there is no signs of bone infection

## 2019-06-21 NOTE — TELEPHONE ENCOUNTER
Tried to call Mr Padmini Serra and had to leave a voicemail that there was no bone infection that showed up on his xray.

## 2019-06-24 ENCOUNTER — TELEPHONE (OUTPATIENT)
Dept: FAMILY MEDICINE CLINIC | Age: 69
End: 2019-06-24

## 2019-06-24 DIAGNOSIS — I95.9 HYPOTENSION, UNSPECIFIED HYPOTENSION TYPE: ICD-10-CM

## 2019-06-24 DIAGNOSIS — G60.9 IDIOPATHIC PERIPHERAL NEUROPATHY: ICD-10-CM

## 2019-06-24 DIAGNOSIS — Z86.79 HISTORY OF ACUTE CONGESTIVE HEART FAILURE: ICD-10-CM

## 2019-06-24 NOTE — TELEPHONE ENCOUNTER
Patient spouse called stated patient was on Entresto 24/26 from admission. Dr. Hillary Flores notified and stated to take half of pill BID. Patient medication changed in chart.

## 2019-07-02 ENCOUNTER — OFFICE VISIT (OUTPATIENT)
Dept: CARDIOLOGY | Age: 69
End: 2019-07-02
Payer: COMMERCIAL

## 2019-07-02 VITALS
BODY MASS INDEX: 31.33 KG/M2 | DIASTOLIC BLOOD PRESSURE: 60 MMHG | SYSTOLIC BLOOD PRESSURE: 92 MMHG | HEIGHT: 75 IN | WEIGHT: 252 LBS | HEART RATE: 58 BPM

## 2019-07-02 DIAGNOSIS — I95.2 HYPOTENSION DUE TO DRUGS: ICD-10-CM

## 2019-07-02 DIAGNOSIS — I25.10 CORONARY ARTERY DISEASE INVOLVING NATIVE CORONARY ARTERY OF NATIVE HEART WITHOUT ANGINA PECTORIS: Primary | ICD-10-CM

## 2019-07-02 DIAGNOSIS — L97.519 CHRONIC ULCER OF GREAT TOE OF RIGHT FOOT, UNSPECIFIED ULCER STAGE (HCC): ICD-10-CM

## 2019-07-02 DIAGNOSIS — I50.22 CHRONIC SYSTOLIC CHF (CONGESTIVE HEART FAILURE) (HCC): ICD-10-CM

## 2019-07-02 DIAGNOSIS — E78.2 MIXED HYPERLIPIDEMIA: ICD-10-CM

## 2019-07-02 DIAGNOSIS — I21.4 NSTEMI (NON-ST ELEVATED MYOCARDIAL INFARCTION) (HCC): ICD-10-CM

## 2019-07-02 PROCEDURE — 99214 OFFICE O/P EST MOD 30 MIN: CPT | Performed by: CLINICAL NURSE SPECIALIST

## 2019-07-02 RX ORDER — FUROSEMIDE 40 MG/1
40 TABLET ORAL DAILY
Qty: 60 TABLET | Refills: 3 | Status: SHIPPED | OUTPATIENT
Start: 2019-07-02 | End: 2019-12-04 | Stop reason: SDUPTHER

## 2019-07-02 ASSESSMENT — ENCOUNTER SYMPTOMS
NAUSEA: 0
ABDOMINAL PAIN: 0
CHEST TIGHTNESS: 0
COUGH: 0
SHORTNESS OF BREATH: 1
VOMITING: 0
FACIAL SWELLING: 0
EYE REDNESS: 0
WHEEZING: 0

## 2019-07-02 NOTE — PROGRESS NOTES
Normocephalic and atraumatic. Right Ear: Hearing and external ear normal.   Left Ear: Hearing and external ear normal.   Nose: Nose normal.   Eyes: Pupils are equal, round, and reactive to light. Right eye exhibits no discharge. Left eye exhibits no discharge. Neck: No JVD present. No tracheal deviation present. No thyromegaly present. Cardiovascular: Normal rate, regular rhythm, normal heart sounds and intact distal pulses. Exam reveals no gallop and no friction rub. No murmur heard. No carotid bruit   Pulmonary/Chest: Effort normal and breath sounds normal. No respiratory distress. He has no wheezes. He has no rales. Abdominal: Soft. There is no tenderness. Musculoskeletal: He exhibits edema (trace lower extremities). Normal gait and station   Neurological: He is alert and oriented to person, place, and time. No cranial nerve deficit. Skin: Skin is warm and dry. No rash noted. Wound right toe- managed by Vascular   Psychiatric: He has a normal mood and affect. His behavior is normal. Judgment normal.   Nursing note and vitals reviewed. Data:  Lab Results   Component Value Date    CHOL 165 05/19/2019    TRIG 186 (H) 05/19/2019    HDL 42 (L) 05/19/2019    LDLCALC 86 05/19/2019     Lab Results   Component Value Date    ALT 50 (H) 05/19/2019    AST 99 (H) 05/19/2019     Lab Results   Component Value Date     06/11/2019    K 5.2 06/11/2019    K 4.5 05/18/2019     06/11/2019    CO2 24 06/11/2019    BUN 40 06/11/2019    CREATININE 1.3 06/11/2019    GLUCOSE 99 06/11/2019    CALCIUM 9.5 06/11/2019      Assessment:     Diagnosis Orders   1. Coronary artery disease involving native coronary artery of native heart without angina pectoris     2. Chronic systolic CHF (congestive heart failure) ([address]     3. NSTEMI (non-ST elevated myocardial infarction) ([address]     4. Chronic ulcer of great toe of right foot, unspecified ulcer stage ([address]     5. Mixed hyperlipidemia     6.  Hypotension due to medications as directed  Continue plan of treatment  It is always recommended that you bring your medicationsbottles with you to each visit - this is for your safety!        Alexandria Cano, APRN

## 2019-07-11 ENCOUNTER — OFFICE VISIT (OUTPATIENT)
Dept: VASCULAR SURGERY | Age: 69
End: 2019-07-11
Payer: COMMERCIAL

## 2019-07-11 VITALS
HEART RATE: 76 BPM | TEMPERATURE: 98 F | SYSTOLIC BLOOD PRESSURE: 112 MMHG | OXYGEN SATURATION: 98 % | RESPIRATION RATE: 18 BRPM | DIASTOLIC BLOOD PRESSURE: 70 MMHG

## 2019-07-11 DIAGNOSIS — L97.512 SKIN ULCER OF THIRD TOE OF RIGHT FOOT WITH FAT LAYER EXPOSED (HCC): Primary | ICD-10-CM

## 2019-07-11 PROCEDURE — 99212 OFFICE O/P EST SF 10 MIN: CPT | Performed by: NURSE PRACTITIONER

## 2019-07-11 NOTE — PROGRESS NOTES
Patient Care Team:  Ita Chaney MD as PCP - General (Family Medicine)  Ita Chaney MD as PCP - [facility]  Lucy Mena MD as Consulting Physician (Interventional Cardiology)      He reports he developed a wound on right great toe. This started 6 1/2 months ago. He believes this is not healing. We gave him off loading shoe last visit and he says he could not wear it and threw it away. He has been applying Santyl. He has not had  fever or chills. He has a history of peripheral vascular disease. Veda Mitchell is a 68 y.o. male with the following history reviewed and recorded in [employer]:  Patient Active Problem List    Diagnosis Date Noted    Chronic systolic CHF (congestive heart failure) ([address] 07/02/2019    Coronary artery disease involving native coronary artery of native heart without angina pectoris 07/02/2019    Hypotension due to drugs 07/02/2019    AAA (abdominal aortic aneurysm) without rupture ([address] 06/21/2019    History of acute congestive heart failure     NSTEMI (non-ST elevated myocardial infarction) ([address] 05/20/2019    Acute on chronic systolic CHF (congestive heart failure) ([address] 05/20/2019    New onset of congestive heart failure ([address] 05/18/2019    Acute pain of right shoulder 02/21/2019    Hearing aid worn 10/03/2017     left      Idiopathic peripheral neuropathy 12/14/2016    Benign nodular prostatic hyperplasia without lower urinary tract symptoms 12/09/2016    Peripheral vascular disease ([address] 12/09/2016     Dorsalis pedis pulses slightly diminished on the right to 1+ where is 2+ on the left. Posterior tibial pulses are +2 and symmetrical bilaterally.  Chronic ulcer of right great toe ([address] 12/09/2016    Tinea pedis of left foot 12/09/2016     4/5 interspace.       Neuropathy 03/15/2016    Gout 09/17/2014    Anxiety 09/17/2014    Mixed hyperlipidemia 09/17/2014     Current Outpatient Medications   Medication Sig Dispense Refill    furosemide (LASIX) 40 MG tablet Take 1 tablet by mouth daily Take additioinal tab for weight gain of 3lbs or more in 24 hrs 60 tablet 3    sacubitril-valsartan (ENTRESTO) 24-26 MG per tablet Take 0.5 tablets by mouth 2 times daily 60 tablet 5    carvedilol (COREG) 3.125 MG tablet Take 1 tablet by mouth 2 times daily (with meals) (Patient taking differently: Take 1.56 mg by mouth 2 times daily (with meals) ) 180 tablet 1    aspirin 81 MG chewable tablet Take 1 tablet by mouth daily 30 tablet 3    isosorbide mononitrate (IMDUR) 30 MG extended release tablet Take 1 tablet by mouth daily 30 tablet 3    nitroGLYCERIN (NITROSTAT) 0.4 MG SL tablet up to max of 3 total doses. If no relief after 1 dose, call 911. 25 tablet 3    atorvastatin (LIPITOR) 40 MG tablet Take 1 tablet by mouth nightly 30 tablet 2    fluticasone (FLONASE) 50 MCG/ACT nasal spray 2 sprays by Each Nare route daily 1 Bottle 3    clopidogrel (PLAVIX) 75 MG tablet Take 1 tablet by mouth daily 30 tablet 3    albuterol sulfate HFA (PROVENTIL HFA) 108 (90 Base) MCG/ACT inhaler Inhale 2 puffs into the lungs every 6 hours as needed for Wheezing 1 Inhaler 3    DULoxetine (CYMBALTA) 60 MG extended release capsule Take 1 capsule by mouth daily 90 capsule 3    sildenafil (VIAGRA) 100 MG tablet Take 1 tablet by mouth as needed for Erectile Dysfunction 6 tablet 5    pentoxifylline (TRENTAL) 400 MG extended release tablet TAKE 1 TABLET BY MOUTH 3 TIMES DAILY (WITH MEALS) 270 tablet 1    Omega-3 Fatty Acids (FISH OIL PO) Take by mouth daily      Multiple Vitamins-Minerals (ONE DAILY MULTIVITAMIN MEN PO) Take by mouth daily       No current facility-administered medications for this visit.       Allergies: Penicillins  Past Medical History:   Diagnosis Date    Gout     History of acute congestive heart failure      Past Surgical History:   Procedure Laterality Date    CARDIAC CATHETERIZATION  05/2019    HERNIA REPAIR       Family History   Problem Relation Age of Onset    Heart Surgery Brother     Heart Disease Brother      Social History     Tobacco Use    Smoking status: Former Smoker     Types: Cigarettes     Last attempt to quit: 1980     Years since quittin.1    Smokeless tobacco: Never Used    Tobacco comment: quit over 25 yrs ago   Substance Use Topics    Alcohol use: No         Review of Systems    Constitutional - no significant activity change, appetite change, or unexpected weight change. No fever or chills. No diaphoresis or significant fatigue. HENT - no significant rhinorrhea or epistaxis. No tinnitus or significant hearing loss. Eyes - no sudden vision change or amaurosis. Respiratory - no significant shortness of breath, wheezing, or stridor. No apnea, cough, or chest tightness associated with shortness of breath. Cardiovascular - no chest pain, syncope, or significant dizziness. No palpitations or significant leg swelling. Patient reports has claudication. Gastrointestinal - no abdominal swelling or pain. No blood in stool. No severe constipation, diarrhea, nausea, or vomiting. Genitourinary - No difficulty urinating, dysuria, frequency, or urgency. No flank pain or hematuria. Musculoskeletal - no back pain, gait disturbance, or myalgia. Skin - no color change, rash, pallor, has wound. Neurologic - no dizziness, facial asymmetry, or light headedness. No seizures. No speech difficulty or lateralizing weakness. Hematologic - no easy bruising or excessive bleeding. Psychiatric - no severe anxiety or nervousness. No confusion. All other review of systems are negative. Physical Exam    /70 (Site: Right Upper Arm, Position: Sitting, Cuff Size: Medium Adult)   Pulse 76   Temp 98 °F (36.7 °C) (Temporal)   Resp 18   SpO2 98%     Constitutional - well developed, well nourished. No diaphoresis or acute distress. HENT - head normocephalic.   Right external ear canal appears normal.  Left external ear canal appears normal.

## 2019-07-16 RX ORDER — SACUBITRIL AND VALSARTAN 24; 26 MG/1; MG/1
TABLET, FILM COATED ORAL
Qty: 60 TABLET | Refills: 5 | Status: SHIPPED | OUTPATIENT
Start: 2019-07-16 | End: 2019-12-11

## 2019-07-23 ENCOUNTER — OFFICE VISIT (OUTPATIENT)
Dept: CARDIOLOGY | Age: 69
End: 2019-07-23
Payer: COMMERCIAL

## 2019-07-23 VITALS
HEIGHT: 75 IN | BODY MASS INDEX: 30.84 KG/M2 | DIASTOLIC BLOOD PRESSURE: 68 MMHG | SYSTOLIC BLOOD PRESSURE: 114 MMHG | WEIGHT: 248 LBS | HEART RATE: 62 BPM

## 2019-07-23 DIAGNOSIS — I95.2 HYPOTENSION DUE TO DRUGS: ICD-10-CM

## 2019-07-23 DIAGNOSIS — I25.10 CORONARY ARTERY DISEASE INVOLVING NATIVE CORONARY ARTERY OF NATIVE HEART WITHOUT ANGINA PECTORIS: ICD-10-CM

## 2019-07-23 DIAGNOSIS — E78.2 MIXED HYPERLIPIDEMIA: ICD-10-CM

## 2019-07-23 DIAGNOSIS — I50.22 CHRONIC SYSTOLIC CONGESTIVE HEART FAILURE (HCC): Primary | ICD-10-CM

## 2019-07-23 DIAGNOSIS — I50.22 CHRONIC SYSTOLIC CHF (CONGESTIVE HEART FAILURE) (HCC): ICD-10-CM

## 2019-07-23 DIAGNOSIS — L97.519 CHRONIC ULCER OF GREAT TOE OF RIGHT FOOT, UNSPECIFIED ULCER STAGE (HCC): ICD-10-CM

## 2019-07-23 PROBLEM — I50.23 ACUTE ON CHRONIC SYSTOLIC CHF (CONGESTIVE HEART FAILURE) (HCC): Status: RESOLVED | Noted: 2019-05-20 | Resolved: 2019-07-23

## 2019-07-23 PROCEDURE — 99214 OFFICE O/P EST MOD 30 MIN: CPT | Performed by: CLINICAL NURSE SPECIALIST

## 2019-07-23 RX ORDER — CARVEDILOL 3.12 MG/1
1.56 TABLET ORAL 2 TIMES DAILY WITH MEALS
COMMUNITY
End: 2019-09-04 | Stop reason: SDUPTHER

## 2019-07-23 ASSESSMENT — ENCOUNTER SYMPTOMS
ABDOMINAL PAIN: 0
EYE REDNESS: 0
COUGH: 0
VOMITING: 0
WHEEZING: 0
FACIAL SWELLING: 0
CHEST TIGHTNESS: 0
NAUSEA: 0
SHORTNESS OF BREATH: 1

## 2019-07-25 ENCOUNTER — OFFICE VISIT (OUTPATIENT)
Dept: VASCULAR SURGERY | Age: 69
End: 2019-07-25
Payer: COMMERCIAL

## 2019-07-25 VITALS
TEMPERATURE: 97.9 F | DIASTOLIC BLOOD PRESSURE: 81 MMHG | RESPIRATION RATE: 18 BRPM | HEIGHT: 75 IN | SYSTOLIC BLOOD PRESSURE: 122 MMHG | HEART RATE: 73 BPM | WEIGHT: 239 LBS | BODY MASS INDEX: 29.72 KG/M2

## 2019-07-25 DIAGNOSIS — S91.109D OPEN WOUND OF TOE, SUBSEQUENT ENCOUNTER: ICD-10-CM

## 2019-07-25 PROBLEM — S91.109A OPEN TOE WOUND: Status: ACTIVE | Noted: 2019-07-25

## 2019-07-25 PROCEDURE — 99212 OFFICE O/P EST SF 10 MIN: CPT | Performed by: NURSE PRACTITIONER

## 2019-07-25 NOTE — PROGRESS NOTES
39.1    Smokeless tobacco: Never Used    Tobacco comment: quit over 25 yrs ago   Substance Use Topics    Alcohol use: No         Review of Systems    Constitutional - no significant activity change, appetite change, or unexpected weight change. No fever or chills. No diaphoresis or significant fatigue. HENT - no significant rhinorrhea or epistaxis. No tinnitus or significant hearing loss. Eyes - no sudden vision change or amaurosis. Respiratory - no significant shortness of breath, wheezing, or stridor. No apnea, cough, or chest tightness associated with shortness of breath. Cardiovascular - no chest pain, syncope, or significant dizziness. No palpitations or significant leg swelling. Patient reports has claudication. Gastrointestinal - no abdominal swelling or pain. No blood in stool. No severe constipation, diarrhea, nausea, or vomiting. Genitourinary - No difficulty urinating, dysuria, frequency, or urgency. No flank pain or hematuria. Musculoskeletal - no back pain, gait disturbance, or myalgia. Skin - no color change, rash, pallor, has wound. Neurologic - no dizziness, facial asymmetry, or light headedness. No seizures. No speech difficulty or lateralizing weakness. Hematologic - no easy bruising or excessive bleeding. Psychiatric - no severe anxiety or nervousness. No confusion. All other review of systems are negative. Physical Exam    /81 (Site: Right Upper Arm, Position: Sitting, Cuff Size: Medium Adult)   Pulse 73   Temp 97.9 °F (36.6 °C) (Temporal)   Resp 18   Ht 6' 3\" (1.905 m)   Wt 239 lb (108.4 kg)   BMI 29.87 kg/m²     Constitutional - well developed, well nourished. No diaphoresis or acute distress. HENT - head normocephalic. Right external ear canal appears normal.  Left external ear canal appears normal.  Septum appears midline. Eyes - conjunctiva normal.  EOMS normal.  No exudate. No icterus.   Neck- ROM appears normal, no tracheal

## 2019-07-31 DIAGNOSIS — Z12.11 SCREEN FOR COLON CANCER: Primary | ICD-10-CM

## 2019-07-31 LAB
CONTROL: PRESENT
HEMOCCULT STL QL: NEGATIVE

## 2019-07-31 PROCEDURE — 82274 ASSAY TEST FOR BLOOD FECAL: CPT | Performed by: FAMILY MEDICINE

## 2019-08-01 ENCOUNTER — TELEPHONE (OUTPATIENT)
Dept: FAMILY MEDICINE CLINIC | Age: 69
End: 2019-08-01

## 2019-08-01 RX ORDER — ASPIRIN 81 MG/1
81 TABLET, CHEWABLE ORAL DAILY
Qty: 90 TABLET | Refills: 3 | Status: SHIPPED | OUTPATIENT
Start: 2019-08-01 | End: 2020-08-27

## 2019-08-01 RX ORDER — ATORVASTATIN CALCIUM 40 MG/1
40 TABLET, FILM COATED ORAL NIGHTLY
Qty: 90 TABLET | Refills: 2 | Status: SHIPPED | OUTPATIENT
Start: 2019-08-01 | End: 2020-04-20

## 2019-08-05 RX ORDER — PENTOXIFYLLINE 400 MG/1
400 TABLET, EXTENDED RELEASE ORAL
Qty: 270 TABLET | Refills: 1 | Status: SHIPPED | OUTPATIENT
Start: 2019-08-05 | End: 2020-02-05

## 2019-08-08 ENCOUNTER — OFFICE VISIT (OUTPATIENT)
Dept: VASCULAR SURGERY | Age: 69
End: 2019-08-08
Payer: COMMERCIAL

## 2019-08-08 VITALS
OXYGEN SATURATION: 98 % | DIASTOLIC BLOOD PRESSURE: 67 MMHG | RESPIRATION RATE: 18 BRPM | SYSTOLIC BLOOD PRESSURE: 106 MMHG | HEART RATE: 61 BPM | TEMPERATURE: 98.6 F

## 2019-08-08 DIAGNOSIS — L97.519 CHRONIC ULCER OF GREAT TOE OF RIGHT FOOT, UNSPECIFIED ULCER STAGE (HCC): Primary | ICD-10-CM

## 2019-08-08 PROCEDURE — 99212 OFFICE O/P EST SF 10 MIN: CPT | Performed by: NURSE PRACTITIONER

## 2019-08-15 ENCOUNTER — OFFICE VISIT (OUTPATIENT)
Dept: VASCULAR SURGERY | Age: 69
End: 2019-08-15
Payer: COMMERCIAL

## 2019-08-15 VITALS
RESPIRATION RATE: 18 BRPM | HEART RATE: 82 BPM | TEMPERATURE: 97.3 F | SYSTOLIC BLOOD PRESSURE: 99 MMHG | OXYGEN SATURATION: 98 % | DIASTOLIC BLOOD PRESSURE: 60 MMHG

## 2019-08-15 DIAGNOSIS — S91.109D OPEN WOUND OF TOE, SUBSEQUENT ENCOUNTER: Primary | ICD-10-CM

## 2019-08-15 PROCEDURE — 99212 OFFICE O/P EST SF 10 MIN: CPT | Performed by: NURSE PRACTITIONER

## 2019-08-15 NOTE — PROGRESS NOTES
270 tablet 1    aspirin 81 MG chewable tablet Take 1 tablet by mouth daily 90 tablet 3    atorvastatin (LIPITOR) 40 MG tablet Take 1 tablet by mouth nightly 90 tablet 2    carvedilol (COREG) 3.125 MG tablet Take 1.56 mg by mouth 2 times daily (with meals)      ENTRESTO 24-26 MG per tablet TAKE 1 TABLET BY MOUTH TWICE A DAY 60 tablet 5    furosemide (LASIX) 40 MG tablet Take 1 tablet by mouth daily Take additioinal tab for weight gain of 3lbs or more in 24 hrs 60 tablet 3    isosorbide mononitrate (IMDUR) 30 MG extended release tablet Take 1 tablet by mouth daily 30 tablet 3    nitroGLYCERIN (NITROSTAT) 0.4 MG SL tablet up to max of 3 total doses. If no relief after 1 dose, call 911. 25 tablet 3    fluticasone (FLONASE) 50 MCG/ACT nasal spray 2 sprays by Each Nare route daily 1 Bottle 3    clopidogrel (PLAVIX) 75 MG tablet Take 1 tablet by mouth daily 30 tablet 3    albuterol sulfate HFA (PROVENTIL HFA) 108 (90 Base) MCG/ACT inhaler Inhale 2 puffs into the lungs every 6 hours as needed for Wheezing 1 Inhaler 3    DULoxetine (CYMBALTA) 60 MG extended release capsule Take 1 capsule by mouth daily 90 capsule 3    sildenafil (VIAGRA) 100 MG tablet Take 1 tablet by mouth as needed for Erectile Dysfunction 6 tablet 5    Omega-3 Fatty Acids (FISH OIL PO) Take by mouth daily      Multiple Vitamins-Minerals (ONE DAILY MULTIVITAMIN MEN PO) Take by mouth daily       No current facility-administered medications for this visit.       Allergies: Penicillins  Past Medical History:   Diagnosis Date    Gout     History of acute congestive heart failure      Past Surgical History:   Procedure Laterality Date    CARDIAC CATHETERIZATION  05/2019    HERNIA REPAIR       Family History   Problem Relation Age of Onset    Heart Surgery Brother     Heart Disease Brother      Social History     Tobacco Use    Smoking status: Former Smoker     Packs/day: 1.00     Types: Cigarettes     Last attempt to quit: 5/23/1980 Years since quittin.2    Smokeless tobacco: Never Used    Tobacco comment: quit over 25 yrs ago   Substance Use Topics    Alcohol use: No         Review of Systems    Constitutional - no significant activity change, appetite change, or unexpected weight change. No fever or chills. No diaphoresis or significant fatigue. HENT - no significant rhinorrhea or epistaxis. No tinnitus or significant hearing loss. Eyes - no sudden vision change or amaurosis. Respiratory - no significant shortness of breath, wheezing, or stridor. No apnea, cough, or chest tightness associated with shortness of breath. Cardiovascular - no chest pain, syncope, or significant dizziness. No palpitations or significant leg swelling. Patient reports has claudication. Gastrointestinal - no abdominal swelling or pain. No blood in stool. No severe constipation, diarrhea, nausea, or vomiting. Genitourinary - No difficulty urinating, dysuria, frequency, or urgency. No flank pain or hematuria. Musculoskeletal - no back pain, gait disturbance, or myalgia. Skin - no color change, rash, pallor, has wound. Neurologic - no dizziness, facial asymmetry, or light headedness. No seizures. No speech difficulty or lateralizing weakness. Hematologic - no easy bruising or excessive bleeding. Psychiatric - no severe anxiety or nervousness. No confusion. All other review of systems are negative. Physical Exam    BP 99/60 Comment: right  Pulse 82   Temp 97.3 °F (36.3 °C)   Resp 18   SpO2 98%     Constitutional - well developed, well nourished. No diaphoresis or acute distress. HENT - head normocephalic. Right external ear canal appears normal.  Left external ear canal appears normal.  Septum appears midline. Eyes - conjunctiva normal.  EOMS normal.  No exudate. No icterus. Neck- ROM appears normal, no tracheal deviation. Cardiovascular - Regular rate and rhythm. Heart sounds are normal.  No murmur, rub, or gallop. Carotid pulses are 2+ to palpation bilaterally without bruit. Extremities - Radial and brachial pulses are 2+ to palpation bilaterally. Right femoral pulse: present 2+; Right popliteal pulse: absent Right DP: absent; Right PT present 2+; Left femoral pulse: present 2+; Left popliteal pulse: absent; Left DP: absent; Left PT: absent  No cyanosis, clubbing, or significant edema. No signs atheroembolic event. Pulmonary - effort appears normal.  No respiratory distress. Lungs - Breath sounds normal. No wheezes or rales. GI - Abdomen - soft, non tender, bowel sounds X 4 quadrants. No guarding or rebound tenderness. No distension or palpable mass. Genitourinary - deferred. Musculoskeletal - ROM appears normal.  No significant edema. Neurologic - alert and oriented X 3. Physiologic. Skin - right third toe with 3 mm wound with surrounding callous which was shaved again. Distal tip toe with 2 mm depth. No redness. Has hammer toe deformity  Psychiatric - mood, affect, and behavior appear normal.  Judgment and thought processes appear normal.    Risk factors for atherosclerosis of all vascular beds have been reviewed with the patient including:  Family history, tobacco abuse in all forms, elevated cholesterol, hyperlipidemia, and diabetes. Options have been discussed with the patient including continued medical management vs. proceeding with cmm. Patient has opted to proceed with continued medical management. Assessment    1.  Open wound of toe, subsequent encounter          Plan    Continue santyl and dermal therapy, dry gauze, after soap and water wash  Off loading shoe  Follow up in 1 week      Recommend no smoking  Strongly encourage statin therapy

## 2019-08-22 ENCOUNTER — OFFICE VISIT (OUTPATIENT)
Dept: VASCULAR SURGERY | Age: 69
End: 2019-08-22
Payer: COMMERCIAL

## 2019-08-22 VITALS
HEART RATE: 72 BPM | TEMPERATURE: 98 F | OXYGEN SATURATION: 98 % | RESPIRATION RATE: 18 BRPM | DIASTOLIC BLOOD PRESSURE: 60 MMHG | SYSTOLIC BLOOD PRESSURE: 94 MMHG

## 2019-08-22 DIAGNOSIS — I71.40 AAA (ABDOMINAL AORTIC ANEURYSM) WITHOUT RUPTURE: ICD-10-CM

## 2019-08-22 DIAGNOSIS — S91.109D OPEN WOUND OF TOE, SUBSEQUENT ENCOUNTER: Primary | ICD-10-CM

## 2019-08-22 PROCEDURE — 99212 OFFICE O/P EST SF 10 MIN: CPT | Performed by: NURSE PRACTITIONER

## 2019-08-29 ENCOUNTER — OFFICE VISIT (OUTPATIENT)
Dept: VASCULAR SURGERY | Age: 69
End: 2019-08-29
Payer: COMMERCIAL

## 2019-08-29 ENCOUNTER — HOSPITAL ENCOUNTER (OUTPATIENT)
Dept: NON INVASIVE DIAGNOSTICS | Age: 69
Discharge: HOME | End: 2019-08-29
Payer: COMMERCIAL

## 2019-08-29 VITALS
TEMPERATURE: 98.2 F | SYSTOLIC BLOOD PRESSURE: 87 MMHG | OXYGEN SATURATION: 99 % | DIASTOLIC BLOOD PRESSURE: 55 MMHG | RESPIRATION RATE: 18 BRPM | HEART RATE: 79 BPM

## 2019-08-29 DIAGNOSIS — Z09 FOLLOW UP: Primary | ICD-10-CM

## 2019-08-29 DIAGNOSIS — I50.22 CHRONIC SYSTOLIC CONGESTIVE HEART FAILURE (HCC): ICD-10-CM

## 2019-08-29 LAB
LV EF: 38 %
LVEF MODALITY: NORMAL

## 2019-08-29 PROCEDURE — 99212 OFFICE O/P EST SF 10 MIN: CPT | Performed by: PHYSICIAN ASSISTANT

## 2019-08-29 PROCEDURE — 6360000004 HC RX CONTRAST MEDICATION: Performed by: INTERNAL MEDICINE

## 2019-08-29 PROCEDURE — C8923 2D TTE W OR W/O FOL W/CON,CO: HCPCS

## 2019-08-29 RX ADMIN — PERFLUTREN 1.5 ML: 6.52 INJECTION, SUSPENSION INTRAVENOUS at 10:07

## 2019-08-29 RX ADMIN — PERFLUTREN 2.2 MG: 6.52 INJECTION, SUSPENSION INTRAVENOUS at 10:10

## 2019-08-30 PROCEDURE — 6360000004 HC RX CONTRAST MEDICATION: Performed by: INTERNAL MEDICINE

## 2019-09-04 ENCOUNTER — OFFICE VISIT (OUTPATIENT)
Dept: CARDIOLOGY | Age: 69
End: 2019-09-04
Payer: COMMERCIAL

## 2019-09-04 VITALS
BODY MASS INDEX: 29.97 KG/M2 | SYSTOLIC BLOOD PRESSURE: 108 MMHG | DIASTOLIC BLOOD PRESSURE: 64 MMHG | WEIGHT: 241 LBS | HEART RATE: 58 BPM | HEIGHT: 75 IN

## 2019-09-04 DIAGNOSIS — I73.9 PERIPHERAL VASCULAR DISEASE (HCC): ICD-10-CM

## 2019-09-04 DIAGNOSIS — I25.10 CORONARY ARTERY DISEASE INVOLVING NATIVE CORONARY ARTERY OF NATIVE HEART WITHOUT ANGINA PECTORIS: Primary | ICD-10-CM

## 2019-09-04 DIAGNOSIS — E78.2 MIXED HYPERLIPIDEMIA: ICD-10-CM

## 2019-09-04 DIAGNOSIS — I50.22 CHRONIC SYSTOLIC CHF (CONGESTIVE HEART FAILURE) (HCC): ICD-10-CM

## 2019-09-04 PROBLEM — I50.9 NEW ONSET OF CONGESTIVE HEART FAILURE (HCC): Status: RESOLVED | Noted: 2019-05-18 | Resolved: 2019-09-04

## 2019-09-04 PROCEDURE — 99213 OFFICE O/P EST LOW 20 MIN: CPT | Performed by: CLINICAL NURSE SPECIALIST

## 2019-09-04 RX ORDER — ISOSORBIDE MONONITRATE 30 MG/1
30 TABLET, EXTENDED RELEASE ORAL DAILY
Qty: 90 TABLET | Refills: 3 | Status: SHIPPED | OUTPATIENT
Start: 2019-09-04 | End: 2020-08-26

## 2019-09-04 RX ORDER — CARVEDILOL 3.12 MG/1
1.56 TABLET ORAL 2 TIMES DAILY WITH MEALS
Qty: 90 TABLET | Refills: 3 | Status: SHIPPED | OUTPATIENT
Start: 2019-09-04 | End: 2019-12-11

## 2019-09-04 RX ORDER — CLOPIDOGREL BISULFATE 75 MG/1
75 TABLET ORAL DAILY
Qty: 90 TABLET | Refills: 3 | Status: SHIPPED | OUTPATIENT
Start: 2019-09-04 | End: 2020-08-24

## 2019-09-04 ASSESSMENT — ENCOUNTER SYMPTOMS
COUGH: 0
ABDOMINAL PAIN: 0
EYE REDNESS: 0
SHORTNESS OF BREATH: 1
FACIAL SWELLING: 0
VOMITING: 0
WHEEZING: 0
NAUSEA: 0
CHEST TIGHTNESS: 0

## 2019-09-04 NOTE — PROGRESS NOTES
HENT:   Head: Normocephalic and atraumatic. Right Ear: Hearing and external ear normal.   Left Ear: Hearing and external ear normal.   Nose: Nose normal.   Eyes: Pupils are equal, round, and reactive to light. Right eye exhibits no discharge. Left eye exhibits no discharge. Neck: No JVD present. No tracheal deviation present. No thyromegaly present. Cardiovascular: Normal rate, regular rhythm, normal heart sounds and intact distal pulses. Exam reveals no gallop and no friction rub. No murmur heard. No carotid bruit   Pulmonary/Chest: Effort normal and breath sounds normal. No respiratory distress. He has no wheezes. He has no rales. Abdominal: Soft. There is no tenderness. Musculoskeletal: He exhibits no edema. Normal gait and station   Neurological: He is alert and oriented to person, place, and time. No cranial nerve deficit. Skin: Skin is warm and dry. No rash noted. Wound right toe now healed   Psychiatric: He has a normal mood and affect. His behavior is normal. Judgment normal.   Nursing note and vitals reviewed. Data:  Lab Results   Component Value Date    CHOL 165 05/19/2019    TRIG 186 (H) 05/19/2019    HDL 42 (L) 05/19/2019    LDLCALC 86 05/19/2019     Lab Results   Component Value Date    ALT 50 (H) 05/19/2019    AST 99 (H) 05/19/2019     Lab Results   Component Value Date     06/11/2019    K 5.2 06/11/2019    K 4.5 05/18/2019     06/11/2019    CO2 24 06/11/2019    BUN 40 06/11/2019    CREATININE 1.3 06/11/2019    GLUCOSE 99 06/11/2019    CALCIUM 9.5 06/11/2019      Echo 8/29/19  Conclusions    Summary   Limited study to assess LV function.   Mild left ventricular enlargement with global hypokinesis estimated   ejection fraction 35-40%   Mild concentric left ventricular hypertrophy. Assessment:     Diagnosis Orders   1. Coronary artery disease involving native coronary artery of native heart without angina pectoris     2.  Chronic systolic CHF (congestive heart

## 2019-11-20 ENCOUNTER — TELEPHONE (OUTPATIENT)
Dept: FAMILY MEDICINE CLINIC | Age: 69
End: 2019-11-20

## 2019-11-30 DIAGNOSIS — I95.9 HYPOTENSION, UNSPECIFIED HYPOTENSION TYPE: ICD-10-CM

## 2019-11-30 DIAGNOSIS — I73.9 PERIPHERAL VASCULAR DISEASE (HCC): ICD-10-CM

## 2019-12-03 RX ORDER — CARVEDILOL 3.12 MG/1
TABLET ORAL
Qty: 180 TABLET | Refills: 1 | Status: SHIPPED | OUTPATIENT
Start: 2019-12-03 | End: 2019-12-11

## 2019-12-04 DIAGNOSIS — Z86.79 HISTORY OF ACUTE CONGESTIVE HEART FAILURE: Primary | ICD-10-CM

## 2019-12-04 DIAGNOSIS — I73.9 PERIPHERAL VASCULAR DISEASE (HCC): ICD-10-CM

## 2019-12-04 RX ORDER — FUROSEMIDE 40 MG/1
40 TABLET ORAL DAILY
Qty: 60 TABLET | Refills: 3 | Status: SHIPPED | OUTPATIENT
Start: 2019-12-04 | End: 2020-02-26

## 2019-12-11 ENCOUNTER — OFFICE VISIT (OUTPATIENT)
Dept: CARDIOLOGY | Age: 69
End: 2019-12-11
Payer: COMMERCIAL

## 2019-12-11 VITALS
HEART RATE: 93 BPM | WEIGHT: 243 LBS | BODY MASS INDEX: 30.21 KG/M2 | DIASTOLIC BLOOD PRESSURE: 80 MMHG | HEIGHT: 75 IN | SYSTOLIC BLOOD PRESSURE: 130 MMHG

## 2019-12-11 DIAGNOSIS — I21.4 NSTEMI (NON-ST ELEVATED MYOCARDIAL INFARCTION) (HCC): ICD-10-CM

## 2019-12-11 DIAGNOSIS — I25.10 CORONARY ARTERY DISEASE INVOLVING NATIVE CORONARY ARTERY OF NATIVE HEART WITHOUT ANGINA PECTORIS: Primary | ICD-10-CM

## 2019-12-11 DIAGNOSIS — I50.22 CHRONIC SYSTOLIC CHF (CONGESTIVE HEART FAILURE) (HCC): ICD-10-CM

## 2019-12-11 DIAGNOSIS — E78.2 MIXED HYPERLIPIDEMIA: ICD-10-CM

## 2019-12-11 DIAGNOSIS — I95.2 HYPOTENSION DUE TO DRUGS: ICD-10-CM

## 2019-12-11 PROCEDURE — 99213 OFFICE O/P EST LOW 20 MIN: CPT | Performed by: CLINICAL NURSE SPECIALIST

## 2019-12-11 RX ORDER — CARVEDILOL 3.12 MG/1
3.12 TABLET ORAL 2 TIMES DAILY WITH MEALS
Qty: 90 TABLET | Refills: 5
Start: 2019-12-11 | End: 2020-06-11 | Stop reason: SDUPTHER

## 2019-12-11 RX ORDER — CARVEDILOL 3.12 MG/1
3.12 TABLET ORAL 2 TIMES DAILY
COMMUNITY
End: 2020-07-16

## 2019-12-11 ASSESSMENT — ENCOUNTER SYMPTOMS
FACIAL SWELLING: 0
EYE REDNESS: 0
SHORTNESS OF BREATH: 1
COUGH: 0
VOMITING: 0
CHEST TIGHTNESS: 0
ABDOMINAL PAIN: 0
WHEEZING: 0
NAUSEA: 0

## 2020-01-16 RX ORDER — SACUBITRIL AND VALSARTAN 24; 26 MG/1; MG/1
TABLET, FILM COATED ORAL
Qty: 180 TABLET | Refills: 3 | Status: SHIPPED | OUTPATIENT
Start: 2020-01-16 | End: 2021-01-13

## 2020-02-05 RX ORDER — PENTOXIFYLLINE 400 MG/1
TABLET, EXTENDED RELEASE ORAL
Qty: 270 TABLET | Refills: 1 | Status: SHIPPED | OUTPATIENT
Start: 2020-02-05 | End: 2020-08-13

## 2020-02-25 RX ORDER — DULOXETIN HYDROCHLORIDE 60 MG/1
CAPSULE, DELAYED RELEASE ORAL
Qty: 90 CAPSULE | Refills: 3 | Status: SHIPPED | OUTPATIENT
Start: 2020-02-25 | End: 2021-02-17

## 2020-02-26 RX ORDER — FUROSEMIDE 40 MG/1
40 TABLET ORAL DAILY
Qty: 180 TABLET | Refills: 1 | Status: SHIPPED | OUTPATIENT
Start: 2020-02-26 | End: 2020-08-24

## 2020-04-20 RX ORDER — ATORVASTATIN CALCIUM 40 MG/1
TABLET, FILM COATED ORAL
Qty: 90 TABLET | Refills: 2 | Status: SHIPPED | OUTPATIENT
Start: 2020-04-20 | End: 2021-01-13

## 2020-04-22 ENCOUNTER — TELEPHONE (OUTPATIENT)
Dept: FAMILY MEDICINE CLINIC | Age: 70
End: 2020-04-22

## 2020-04-22 ASSESSMENT — PATIENT HEALTH QUESTIONNAIRE - PHQ9
SUM OF ALL RESPONSES TO PHQ QUESTIONS 1-9: 0
SUM OF ALL RESPONSES TO PHQ QUESTIONS 1-9: 0
1. LITTLE INTEREST OR PLEASURE IN DOING THINGS: 0
SUM OF ALL RESPONSES TO PHQ9 QUESTIONS 1 & 2: 0
2. FEELING DOWN, DEPRESSED OR HOPELESS: 0

## 2020-06-11 ENCOUNTER — OFFICE VISIT (OUTPATIENT)
Dept: CARDIOLOGY | Age: 70
End: 2020-06-11
Payer: COMMERCIAL

## 2020-06-11 VITALS
DIASTOLIC BLOOD PRESSURE: 60 MMHG | SYSTOLIC BLOOD PRESSURE: 102 MMHG | WEIGHT: 240 LBS | HEART RATE: 67 BPM | BODY MASS INDEX: 29.84 KG/M2 | HEIGHT: 75 IN

## 2020-06-11 PROCEDURE — 99214 OFFICE O/P EST MOD 30 MIN: CPT | Performed by: CLINICAL NURSE SPECIALIST

## 2020-06-11 PROCEDURE — 0296T PR EXT ECG > 48HR TO 21 DAY RCRD W/CONECT INTL RCRD: CPT | Performed by: CLINICAL NURSE SPECIALIST

## 2020-06-11 PROCEDURE — 93000 ELECTROCARDIOGRAM COMPLETE: CPT | Performed by: CLINICAL NURSE SPECIALIST

## 2020-06-11 ASSESSMENT — ENCOUNTER SYMPTOMS
NAUSEA: 0
ABDOMINAL PAIN: 0
FACIAL SWELLING: 0
COUGH: 0
WHEEZING: 0
CHEST TIGHTNESS: 0
SHORTNESS OF BREATH: 1
VOMITING: 0
EYE REDNESS: 0

## 2020-06-11 NOTE — PROGRESS NOTES
[facility], [address] [place] [address], [id number]  Phone: [phone]  Fax: [phone]    OFFICE VISIT:  2020    Pee Medeiros - : 1950    Reason For Visit:  Ernie Spring is a 65 y.o. male who is here for Coronary Artery Disease (No cardiac sx today. ) and Congestive Heart Failure     Diagnosis Orders   1. Coronary artery disease involving native coronary artery of native heart without angina pectoris  EKG 12 lead   2. PVC (premature ventricular contraction)  SD EXT ECG > 48HR TO 21 DAY RCRD W/CONECT INTL RCRD    ECHO Complete 2D W Doppler W Color   3. Chronic systolic congestive heart failure (HCC)  ECHO Complete 2D W Doppler W Color   4. Mixed hyperlipidemia        HPI  Patient presented to the hospital on 2019 with acute decompensated heart failure, non-STEMI. Cath showed EF of 30 to 35% with PCI to the proximal circumflex, mid and distal LAD. EF improved to 35-40% in Aug 2019. He has a history of a chronic nonhealing toe ulcer with history of PAD and has followed with vascular surgery. Wound is now healed. He denies chest pain. He has some mild shortness of breath, but denies orthopnea , PND, edema, or sudden weight gain. He is active at home and works outdoors most of the day and tolerates this well he states. He wants to know if he can get back to work, but states his ejection fraction would have to be at least 45% or above. Madison Hoskins MD is PCP.   Pee Medeiros has the following history as recorded in [facility]:    Patient Active Problem List    Diagnosis Date Noted    Open toe wound 2019    Chronic systolic CHF (congestive heart failure) ([facility] [address] 2019    Coronary artery disease involving native coronary artery of native heart without angina pectoris 2019    Hypotension due to drugs 2019    AAA (abdominal aortic aneurysm) without rupture ([facility] [address] 2019    History of acute congestive heart failure     NSTEMI (non-ST assess PVC burden. Stable cardiovascular status. No evidence of overt heart failure,angina or dysrhythmia. Plan    Return in about 6 months (around 12/11/2020). Zio Patch 48 hrs  Echocardiogram    OK to take an extra Lasix for weight gain over 3lbs in 24 hours. If weight is not improving by the next day, call the office.    - Weigh daily and report weight gain of 3lbs or more in 24hrs or 5lbs in one week. - Call for increasing shortness of breath or increasing swelling in feet and legs. (This could mean you are retaining too much fluid)  - 2000mg low sodium diet  - Fluid restriction of 1500ml per day (about 6 cups of fluid per day)    Call with any questionsor concerns  Follow up with Yulisa Reynolds MD for non cardiac problems  Report any new problems  Cardiovascular Fitness-Exercise as tolerated. Strive for 15 minutes of exercise most days of the week. Cardiac / HealthyDiet  Continue current medications as directed  Continue plan of treatment  It is always recommended that you bring your medicationsbottles with you to each visit - this is for your safety!        CHRIS William

## 2020-06-16 RX ORDER — NITROGLYCERIN 0.4 MG/1
TABLET SUBLINGUAL
Qty: 25 TABLET | Refills: 3 | Status: SHIPPED | OUTPATIENT
Start: 2020-06-16 | End: 2020-10-12

## 2020-06-17 ENCOUNTER — HOSPITAL ENCOUNTER (OUTPATIENT)
Dept: NON INVASIVE DIAGNOSTICS | Age: 70
Discharge: HOME | End: 2020-06-17
Payer: COMMERCIAL

## 2020-06-17 LAB
LV EF: 45 %
LVEF MODALITY: NORMAL

## 2020-06-17 PROCEDURE — C8929 TTE W OR WO FOL WCON,DOPPLER: HCPCS

## 2020-06-17 PROCEDURE — 6360000004 HC RX CONTRAST MEDICATION: Performed by: INTERNAL MEDICINE

## 2020-06-17 RX ADMIN — PERFLUTREN 1.65 MG: 6.52 INJECTION, SUSPENSION INTRAVENOUS at 12:20

## 2020-06-22 ENCOUNTER — TELEPHONE (OUTPATIENT)
Dept: CARDIOLOGY | Age: 70
End: 2020-06-22

## 2020-06-30 ENCOUNTER — TELEPHONE (OUTPATIENT)
Dept: CARDIOLOGY | Age: 70
End: 2020-06-30

## 2020-07-16 RX ORDER — CARVEDILOL 3.12 MG/1
TABLET ORAL
Qty: 180 TABLET | Refills: 1 | Status: SHIPPED | OUTPATIENT
Start: 2020-07-16 | End: 2020-10-12

## 2020-07-17 DIAGNOSIS — N40.0 BENIGN NODULAR PROSTATIC HYPERPLASIA WITHOUT LOWER URINARY TRACT SYMPTOMS: ICD-10-CM

## 2020-07-17 DIAGNOSIS — E34.9 TESTOSTERONE DEFICIENCY: ICD-10-CM

## 2020-07-17 DIAGNOSIS — E87.5 HYPERKALEMIA: ICD-10-CM

## 2020-07-17 DIAGNOSIS — E78.2 MIXED HYPERLIPIDEMIA: ICD-10-CM

## 2020-07-17 LAB
ALBUMIN SERPL-MCNC: 4.4 G/DL (ref 3.5–5.2)
ALP BLD-CCNC: 66 U/L (ref 40–130)
ALT SERPL-CCNC: 22 U/L (ref 5–41)
ANION GAP SERPL CALCULATED.3IONS-SCNC: 11 MMOL/L (ref 7–19)
AST SERPL-CCNC: 15 U/L (ref 5–40)
BILIRUB SERPL-MCNC: 0.5 MG/DL (ref 0.2–1.2)
BILIRUBIN DIRECT: 0.2 MG/DL (ref 0–0.3)
BILIRUBIN URINE: NEGATIVE
BILIRUBIN, INDIRECT: 0.3 MG/DL (ref 0.1–1)
BLOOD, URINE: NEGATIVE
BUN BLDV-MCNC: 34 MG/DL (ref 8–23)
CALCIUM SERPL-MCNC: 9.1 MG/DL (ref 8.8–10.2)
CHLORIDE BLD-SCNC: 105 MMOL/L (ref 98–111)
CHOLESTEROL, TOTAL: 113 MG/DL (ref 160–199)
CLARITY: CLEAR
CO2: 24 MMOL/L (ref 22–29)
COLOR: YELLOW
CREAT SERPL-MCNC: 1.4 MG/DL (ref 0.5–1.2)
GFR AFRICAN AMERICAN: >59
GFR NON-AFRICAN AMERICAN: 50
GLUCOSE BLD-MCNC: 98 MG/DL (ref 74–109)
GLUCOSE URINE: NEGATIVE MG/DL
HCT VFR BLD CALC: 39.1 % (ref 42–52)
HDLC SERPL-MCNC: 47 MG/DL (ref 55–121)
HEMOGLOBIN: 13.2 G/DL (ref 14–18)
KETONES, URINE: NEGATIVE MG/DL
LDL CHOLESTEROL CALCULATED: 45 MG/DL
LEUKOCYTE ESTERASE, URINE: NEGATIVE
MCH RBC QN AUTO: 33.9 PG (ref 27–31)
MCHC RBC AUTO-ENTMCNC: 33.8 G/DL (ref 33–37)
MCV RBC AUTO: 100.5 FL (ref 80–94)
NITRITE, URINE: NEGATIVE
PDW BLD-RTO: 13 % (ref 11.5–14.5)
PH UA: 5.5 (ref 5–8)
PLATELET # BLD: 166 K/UL (ref 130–400)
PMV BLD AUTO: 10.5 FL (ref 9.4–12.4)
POTASSIUM SERPL-SCNC: 5.1 MMOL/L (ref 3.5–5)
PROTEIN UA: NEGATIVE MG/DL
RBC # BLD: 3.89 M/UL (ref 4.7–6.1)
SODIUM BLD-SCNC: 140 MMOL/L (ref 136–145)
SPECIFIC GRAVITY UA: 1.02 (ref 1–1.03)
T4 FREE: 1 NG/DL (ref 0.93–1.7)
TOTAL PROTEIN: 6.7 G/DL (ref 6.6–8.7)
TRIGL SERPL-MCNC: 103 MG/DL (ref 0–149)
TSH SERPL DL<=0.05 MIU/L-ACNC: 2.6 UIU/ML (ref 0.27–4.2)
UROBILINOGEN, URINE: 0.2 E.U./DL
WBC # BLD: 5.8 K/UL (ref 4.8–10.8)

## 2020-07-21 ENCOUNTER — TELEPHONE (OUTPATIENT)
Dept: FAMILY MEDICINE CLINIC | Age: 70
End: 2020-07-21

## 2020-07-21 NOTE — TELEPHONE ENCOUNTER
----- Message from Vy May MD sent at 7/21/2020  1:46 PM CDT -----  Urinalysis was totally negative. PSA was normal at 0.6. Thyroids were both normal.  Hepatic function panel was all normal.  Creatinine was slightly elevated at 1.4. This is similar to what it has been in the past.  We should emphasize the patient needs to drink plenty of water. GFR was 50 which was just a bit worse than a year ago but better than those prior. Total cholesterol was actually low at 113. Triglycerides were good at 103. HDL is just a bit low at 47. White blood count was normal.  Hemoglobin is just a bit low at 13.2 but this is stable over the last 5 or 6 blood draws.   Platelet counts normal.  Testosterone is pending

## 2020-07-22 LAB
SEX HORMONE BINDING GLOBULIN: 34 NMOL/L (ref 11–80)
TESTOSTERONE FREE-NONMALE: 35.2 PG/ML (ref 47–244)
TESTOSTERONE TOTAL: 189 NG/DL (ref 220–1000)

## 2020-07-28 ENCOUNTER — TELEPHONE (OUTPATIENT)
Dept: FAMILY MEDICINE CLINIC | Age: 70
End: 2020-07-28

## 2020-07-29 RX ORDER — TESTOSTERONE CYPIONATE 200 MG/ML
200 VIAL (ML) INTRAMUSCULAR
Qty: 10 ML | Refills: 0 | Status: SHIPPED | OUTPATIENT
Start: 2020-07-29 | End: 2021-01-18

## 2020-08-13 RX ORDER — PENTOXIFYLLINE 400 MG/1
TABLET, EXTENDED RELEASE ORAL
Qty: 270 TABLET | Refills: 1 | Status: SHIPPED | OUTPATIENT
Start: 2020-08-13 | End: 2021-02-17

## 2020-08-24 RX ORDER — CLOPIDOGREL BISULFATE 75 MG/1
TABLET ORAL
Qty: 90 TABLET | Refills: 3 | Status: SHIPPED | OUTPATIENT
Start: 2020-08-24 | End: 2021-06-25 | Stop reason: SDUPTHER

## 2020-08-24 RX ORDER — FUROSEMIDE 40 MG/1
40 TABLET ORAL DAILY
Qty: 180 TABLET | Refills: 1 | Status: SHIPPED | OUTPATIENT
Start: 2020-08-24 | End: 2021-02-18

## 2020-08-26 ENCOUNTER — HOSPITAL ENCOUNTER (OUTPATIENT)
Dept: ULTRASOUND IMAGING | Age: 70
Discharge: HOME | End: 2020-08-26
Payer: COMMERCIAL

## 2020-08-26 PROCEDURE — 93978 VASCULAR STUDY: CPT

## 2020-08-26 RX ORDER — ISOSORBIDE MONONITRATE 30 MG/1
TABLET, EXTENDED RELEASE ORAL
Qty: 90 TABLET | Refills: 0 | Status: SHIPPED | OUTPATIENT
Start: 2020-08-26 | End: 2020-10-12

## 2020-08-27 RX ORDER — ASPIRIN 81 MG/1
TABLET, CHEWABLE ORAL
Qty: 90 TABLET | Refills: 3 | Status: SHIPPED | OUTPATIENT
Start: 2020-08-27 | End: 2021-10-14 | Stop reason: SDUPTHER

## 2020-09-09 ENCOUNTER — TELEPHONE (OUTPATIENT)
Dept: VASCULAR SURGERY | Age: 70
End: 2020-09-09

## 2020-09-10 ENCOUNTER — VIRTUAL VISIT (OUTPATIENT)
Dept: VASCULAR SURGERY | Age: 70
End: 2020-09-10
Payer: COMMERCIAL

## 2020-09-10 PROCEDURE — 99213 OFFICE O/P EST LOW 20 MIN: CPT | Performed by: NURSE PRACTITIONER

## 2020-09-10 NOTE — PROGRESS NOTES
9/10/2020    TELEHEALTH EVALUATION -- Audio/Visual (During AZUSG-36 public health emergency)    HPI:    Patient is located at home  Provider is located at [facility]   Also present during call is wife    Emilie Diamond (:  1950) has requested an audio/video evaluation for the following concern(s):    He has a known history of abdominal aortic aneurysm for 1 - 5 years. He has not had abdominal pain. He has not had back pain in the cervical spine, thoracic spine, lumbar spine and sacral spine region. This pain is unchanged since the last visit. Pain is rated as 0. Prior to Visit Medications    Medication Sig Taking? Authorizing Provider   aspirin 81 MG chewable tablet TAKE 1 TABLET BY MOUTH EVERY DAY Yes Payton Molina MD   isosorbide mononitrate (IMDUR) 30 MG extended release tablet TAKE 1 TABLET BY MOUTH EVERY DAY Yes CHRIS Garcia - CNP   furosemide (LASIX) 40 MG tablet TAKE 1 TABLET BY MOUTH DAILY TAKE ADDITIOINAL TAB FOR WEIGHT GAIN OF 3LBS OR MORE IN 24 HRS Yes Payton Molina MD   clopidogrel (PLAVIX) 75 MG tablet TAKE 1 TABLET BY MOUTH EVERY DAY Yes CHRIS Chaudhari - NP   pentoxifylline (TRENTAL) 400 MG extended release tablet TAKE 1 TABLET BY MOUTH 3 TIMES DAILY WITH MEALS Yes Payton Molina MD   Testosterone Cypionate 200 MG/ML SOLN Inject 200 mg as directed every 14 days Yes Payton Molina MD   Needles & Syringes MISC 1 each by Does not apply route daily 3 cc syringes with 1 1/2 g needles for injection. Yes Payton Molina MD   carvedilol (COREG) 3.125 MG tablet TAKE 1 TABLET BY MOUTH TWICE A DAY WITH MEALS Yes Payton Molina MD   nitroGLYCERIN (NITROSTAT) 0.4 MG SL tablet TAKE UP TO MAX OF 3 TOTAL DOSES. IF NO RELIEF AFTER 1 DOSE, CALL 911.  Yes CHRIS Burns   atorvastatin (LIPITOR) 40 MG tablet TAKE 1 TABLET BY MOUTH EVERY DAY AT NIGHT Yes Payton Molina MD   DULoxetine (CYMBALTA) 60 MG extended release capsule TAKE 1 CAPSULE BY MOUTH EVERY DAY Yes Payton Molina MD   ENTRESTO 24-26 MG per tablet TAKE 1 TABLET BY MOUTH TWICE A DAY Yes CHRIS Thomas   Omega-3 Fatty Acids (FISH OIL PO) Take by mouth daily Yes Historical Provider, MD   Multiple Vitamins-Minerals (ONE DAILY MULTIVITAMIN MEN PO) Take by mouth daily Yes Historical Provider, MD       Social History     Tobacco Use    Smoking status: Former Smoker     Packs/day: 1.00     Types: Cigarettes     Last attempt to quit: 1980     Years since quittin.3    Smokeless tobacco: Never Used    Tobacco comment: quit over 25 yrs ago   Substance Use Topics    Alcohol use: No    Drug use: No        Allergies   Allergen Reactions    Penicillins      Unknown what it does   ,   Past Medical History:   Diagnosis Date    Gout     History of acute congestive heart failure    ,   Past Surgical History:   Procedure Laterality Date    CARDIAC CATHETERIZATION  2019    HERNIA REPAIR     ,   Social History     Tobacco Use    Smoking status: Former Smoker     Packs/day: 1.00     Types: Cigarettes     Last attempt to quit: 1980     Years since quittin.3    Smokeless tobacco: Never Used    Tobacco comment: quit over 25 yrs ago   Substance Use Topics    Alcohol use: No    Drug use: No   ,   Family History   Problem Relation Age of Onset    Heart Surgery Brother     Heart Disease Brother    ,   Health Maintenance   Topic Date Due    Hepatitis C screen  1950    Shingles Vaccine (1 of 2) 04/15/1998    Pneumococcal 65+ years Vaccine (2 of 2 - PPSV23) 2016    Colon Cancer Screen FIT/FOBT  2020    Flu vaccine (1) 2020    Lipid screen  2021    Potassium monitoring  2021    Creatinine monitoring  2021    DTaP/Tdap/Td vaccine (2 - Td) 12/15/2024    Hepatitis A vaccine  Aged Out    Hepatitis B vaccine  Aged Out    Hib vaccine  Aged Out    Meningococcal (ACWY) vaccine  Aged Out       Review of Systems    Constitutional - no significant activity change, appetite change, or unexpected weight change. No fever or chills. No diaphoresis or significant fatigue. HENT - no significant rhinorrhea or epistaxis. No tinnitus or significant hearing loss. Eyes - no sudden vision change or amaurosis. Respiratory - no significant shortness of breath, wheezing, or stridor. No apnea, cough, or chest tightness associated with shortness of breath. Cardiovascular - no chest pain, syncope, or significant dizziness. No palpitations or significant leg swelling.  has not had claudication. Gastrointestinal -  has not had abdominal swelling or pain. No blood in stool. No severe constipation, diarrhea, nausea, or vomiting. Genitourinary - No difficulty urinating, dysuria, frequency, or urgency. No flank pain or hematuria. Musculoskeletal - has not had back pain, gait disturbance, or myalgia. Skin - no color change, rash, pallor, or new wound. Neurologic - no dizziness, facial asymmetry, or light headedness. No seizures. No speech difficulty or lateralizing weakness. Hematologic - no easy bruising or excessive bleeding. Psychiatric - no severe anxiety or nervousness. No confusion. All other review of systems are negative. PHYSICAL EXAMINATION:    Due to this being a TeleHealth encounter, evaluation of the following organ systems is limited: Vitals/Constitutional/EENT/Resp/CV/GI//MS/Neuro/Skin/Heme-Lymph-Imm. Constitutional - well developed, well nourished. No diaphoresis or acute distress. HENT - head normocephalic. Right external ear canal appears normal.  Left external ear canal appears normal.  Septum appears midline. Eyes - conjunctiva normal.   No exudate. No icterus. Neck- ROM appears normal, no tracheal deviation. Extremities -No cyanosis, clubbing, no edema. No signs atheroembolic event. Pulmonary - effort appears normal.  No respiratory distress.   No accessory muscle use  Musculoskeletal -   Motor grossly intact in visible lower extremities and upper extremities  Neurologic - alert and oriented X 3. Cranial Nerves II-XII grossly intact  Skin - intact. No rash, erythema, or pallor. Right third toe with 1 cm area plantar surface callous  from toe rubbing  Psychiatric - mood, affect, and behavior appear normal.  Judgment and thought processes appear normal.    Aortic ultrasound:  3.2 cm abdominal aortic aneurysm. This aneurysm has increased since the last visit. Individual films reviewed:  Yes. These results have been reviewed with the patient. ASSESSMENT/PLAN:  No diagnosis found. No follow-ups on file. Symptoms of rupture reviewed with the patient including sudden onset severe back pain or abdominal pain. This pain can sometimes radiate into the groin or leg. The patient may experience a feeling of impending doom or death. If this occurs they have been insturcted to call 911 and get to the emergency room telling them you have an aneurysm. Patient has voiced understanding. 12 months with aortic u/s  flexitol to callous. Call if develops open wound    Strongly encouraged start/continue statin therapy  Recommended no smoking  An  electronic signature was used to authenticate this note. --CHRIS Bullard on 9/10/2020 at 9:43 AM        Pursuant to the emergency declaration under the [facility] waiver authority and the [employer] Act, this Virtual  Visit was conducted, with patient's consent, to reduce the patient's risk of exposure to COVID-19 and provide continuity of care for an established patient. Services were provided through a video synchronous discussion virtually to substitute for in-person clinic visit.

## 2020-10-12 RX ORDER — NITROGLYCERIN 0.4 MG/1
TABLET SUBLINGUAL
Qty: 25 TABLET | Refills: 1 | Status: SHIPPED | OUTPATIENT
Start: 2020-10-12 | End: 2020-12-15 | Stop reason: SDUPTHER

## 2020-10-12 RX ORDER — ISOSORBIDE MONONITRATE 30 MG/1
TABLET, EXTENDED RELEASE ORAL
Qty: 90 TABLET | Refills: 3 | Status: SHIPPED | OUTPATIENT
Start: 2020-10-12 | End: 2020-12-15 | Stop reason: SDUPTHER

## 2020-10-12 RX ORDER — CARVEDILOL 3.12 MG/1
TABLET ORAL
Qty: 90 TABLET | Refills: 3 | Status: SHIPPED | OUTPATIENT
Start: 2020-10-12 | End: 2020-12-22 | Stop reason: SDUPTHER

## 2020-11-10 ENCOUNTER — TELEPHONE (OUTPATIENT)
Dept: CARDIOLOGY | Age: 70
End: 2020-11-10

## 2020-11-10 NOTE — TELEPHONE ENCOUNTER
11/10 called needing to r/s appt on mondays @ [facility] for Dalila Hands will be:   @ [facility] on Mondays  @ [facility] on Tuesdays

## 2020-11-11 ENCOUNTER — TELEPHONE (OUTPATIENT)
Dept: FAMILY MEDICINE CLINIC | Age: 70
End: 2020-11-11

## 2020-11-18 ENCOUNTER — TELEPHONE (OUTPATIENT)
Dept: CARDIOLOGY | Age: 70
End: 2020-11-18

## 2020-11-18 NOTE — TELEPHONE ENCOUNTER
11/18 called needing to r/s apt with Leonardo Drummond. Her days in the [place] location are changing. she will be @ [facility] on Mondays  she will be @ [place] location on Tuesdays    can be moved to another NP if the pt needs a certain day.     attempt 2

## 2020-12-15 ENCOUNTER — OFFICE VISIT (OUTPATIENT)
Dept: CARDIOLOGY | Age: 70
End: 2020-12-15
Payer: COMMERCIAL

## 2020-12-15 VITALS
WEIGHT: 247 LBS | BODY MASS INDEX: 30.71 KG/M2 | SYSTOLIC BLOOD PRESSURE: 112 MMHG | HEART RATE: 64 BPM | DIASTOLIC BLOOD PRESSURE: 74 MMHG | HEIGHT: 75 IN | OXYGEN SATURATION: 98 %

## 2020-12-15 PROCEDURE — 99213 OFFICE O/P EST LOW 20 MIN: CPT | Performed by: CLINICAL NURSE SPECIALIST

## 2020-12-15 RX ORDER — NITROGLYCERIN 0.4 MG/1
TABLET SUBLINGUAL
Qty: 25 TABLET | Refills: 1 | Status: SHIPPED | OUTPATIENT
Start: 2020-12-15 | End: 2021-04-07 | Stop reason: SDUPTHER

## 2020-12-15 RX ORDER — ISOSORBIDE MONONITRATE 30 MG/1
30 TABLET, EXTENDED RELEASE ORAL DAILY
Qty: 90 TABLET | Refills: 3 | Status: SHIPPED | OUTPATIENT
Start: 2020-12-15 | End: 2021-08-30

## 2020-12-15 ASSESSMENT — ENCOUNTER SYMPTOMS
SHORTNESS OF BREATH: 1
VOMITING: 0
ABDOMINAL PAIN: 0
CHEST TIGHTNESS: 0
WHEEZING: 0
COUGH: 0
FACIAL SWELLING: 0
EYE REDNESS: 0
NAUSEA: 0

## 2020-12-15 NOTE — PROGRESS NOTES
[facility], [address] [place] [address], [address] [id number]  Phone: [phone]  Fax: [phone]    OFFICE VISIT:  12/15/2020    Basilia Tejeda - : 1950    Reason For Visit:  Saskia Rothman is a 65 y.o. male who is here for 6 Month Follow-Up (No cardiac sx today ) and Coronary Artery Disease     Diagnosis Orders   1. Coronary artery disease involving native coronary artery of native heart without angina pectoris     2. Chronic systolic congestive heart failure ([address]     3. PVC (premature ventricular contraction)     4. Mixed hyperlipidemia        HPI  Patient presented to the hospital on 2019 with acute decompensated heart failure, non-STEMI. Cath showed EF of 30 to 35% with PCI to the proximal circumflex, mid and distal LAD. EF improved to 35-40% in Aug 2019, and EF 45% . He denies chest pain. He has some mild shortness of breath, but denies orthopnea , PND, edema, or sudden weight gain. He is active at home and works outdoors most of the day and tolerates this well he states. Guanaco Mcintosh MD is PCP.   Basilia Tejeda has the following history as recorded in [facility]:    Patient Active Problem List    Diagnosis Date Noted    Open toe wound 2019    Chronic systolic CHF (congestive heart failure) ([address] 2019    Coronary artery disease involving native coronary artery of native heart without angina pectoris 2019    Hypotension due to drugs 2019    AAA (abdominal aortic aneurysm) without rupture ([address] 2019    History of acute congestive heart failure     NSTEMI (non-ST elevated myocardial infarction) ([address] 2019    Acute pain of right shoulder 2019    Hearing aid worn 10/03/2017    Idiopathic peripheral neuropathy 2016    Benign nodular prostatic hyperplasia without lower urinary tract symptoms 2016    Peripheral vascular disease ([address] 2016    Chronic ulcer of right great toe ([address] 2016    Tinea pedis of left foot 2016    Neuropathy 03/15/2016    Gout 2014    Anxiety 2014    Mixed hyperlipidemia 2014     Past Medical History:   Diagnosis Date    Gout     History of acute congestive heart failure      Past Surgical History:   Procedure Laterality Date    CARDIAC CATHETERIZATION  2019    HERNIA REPAIR       Family History   Problem Relation Age of Onset    Heart Surgery Brother     Heart Disease Brother      Social History     Tobacco Use    Smoking status: Former Smoker     Packs/day: 1.00     Types: Cigarettes     Quit date: 1980     Years since quittin.5    Smokeless tobacco: Never Used    Tobacco comment: quit over 25 yrs ago   Substance Use Topics    Alcohol use: No      Current Outpatient Medications   Medication Sig Dispense Refill    nitroGLYCERIN (NITROSTAT) 0.4 MG SL tablet up to max of 3 total doses. If no relief after 1 dose, call 911. 25 tablet 1    isosorbide mononitrate (IMDUR) 30 MG extended release tablet Take 1 tablet by mouth daily 90 tablet 3    carvedilol (COREG) 3.125 MG tablet TAKE 0.5 TABLETS BY MOUTH 2 TIMES DAILY (WITH MEALS) 90 tablet 3    aspirin 81 MG chewable tablet TAKE 1 TABLET BY MOUTH EVERY DAY 90 tablet 3    furosemide (LASIX) 40 MG tablet TAKE 1 TABLET BY MOUTH DAILY TAKE ADDITIOINAL TAB FOR WEIGHT GAIN OF 3LBS OR MORE IN 24  tablet 1    clopidogrel (PLAVIX) 75 MG tablet TAKE 1 TABLET BY MOUTH EVERY DAY 90 tablet 3    pentoxifylline (TRENTAL) 400 MG extended release tablet TAKE 1 TABLET BY MOUTH 3 TIMES DAILY WITH MEALS 270 tablet 1    Testosterone Cypionate 200 MG/ML SOLN Inject 200 mg as directed every 14 days 10 mL 0    Needles & Syringes MISC 1 each by Does not apply route daily 3 cc syringes with 1 1/2 g needles for injection.  4 each 3    atorvastatin (LIPITOR) 40 MG tablet TAKE 1 TABLET BY MOUTH EVERY DAY AT NIGHT 90 tablet 2    DULoxetine (CYMBALTA) 60 MG extended release capsule TAKE 1 CAPSULE BY MOUTH EVERY DAY 90 capsule 3    ENTRESTO 24-26 MG per tablet TAKE 1 TABLET BY MOUTH TWICE A  tablet 3    Omega-3 Fatty Acids (FISH OIL PO) Take by mouth daily      Multiple Vitamins-Minerals (ONE DAILY MULTIVITAMIN MEN PO) Take by mouth daily       No current facility-administered medications for this visit. Allergies: Penicillins    Review of Systems  Review of Systems   Constitutional: Negative for activity change, diaphoresis, fatigue, fever and unexpected weight change. HENT: Negative for facial swelling and nosebleeds. Eyes: Negative for redness and visual disturbance. Respiratory: Positive for shortness of breath (mild, unchanged). Negative for cough, chest tightness and wheezing. Cardiovascular: Negative for chest pain, palpitations and leg swelling. Gastrointestinal: Negative for abdominal pain, nausea and vomiting. Endocrine: Negative for cold intolerance and heat intolerance. Genitourinary: Negative for dysuria and hematuria. Musculoskeletal: Negative for arthralgias and myalgias. Skin: Negative for pallor and rash. Neurological: Negative for dizziness, seizures, syncope, weakness and light-headedness. Hematological: Does not bruise/bleed easily. Psychiatric/Behavioral: Negative for agitation. The patient is not nervous/anxious. Objective  Vital Signs - /74   Pulse 64   Ht 6' 3\" (1.905 m)   Wt 247 lb (112 kg)   SpO2 98%   BMI 30.87 kg/m²    Wt Readings from Last 3 Encounters:   12/15/20 247 lb (112 kg)   06/11/20 240 lb (108.9 kg)   12/11/19 243 lb (110.2 kg)        BP Readings from Last 3 Encounters:   12/15/20 112/74   06/11/20 102/60   12/11/19 130/80    Pulse Readings from Last 3 Encounters:   12/15/20 64   06/11/20 67   12/11/19 93        Physical Exam  Vitals signs and nursing note reviewed. Constitutional:       General: He is not in acute distress. Appearance: Normal appearance. He is well-developed.    HENT:      Head: Normocephalic and atraumatic. Right Ear: Hearing and external ear normal.      Left Ear: Hearing and external ear normal.      Nose: Nose normal.   Eyes:      General:         Right eye: No discharge. Left eye: No discharge. Pupils: Pupils are equal, round, and reactive to light. Neck:      Musculoskeletal: Neck supple. No muscular tenderness. Thyroid: No thyromegaly. Vascular: No carotid bruit or JVD. Trachea: No tracheal deviation. Cardiovascular:      Rate and Rhythm: Normal rate and regular rhythm. Heart sounds: Normal heart sounds. No murmur. No friction rub. No gallop. Comments: No carotid bruit  Pulmonary:      Effort: Pulmonary effort is normal. No respiratory distress. Breath sounds: Normal breath sounds. No wheezing or rales. Abdominal:      Palpations: Abdomen is soft. Tenderness: There is no abdominal tenderness. Musculoskeletal:         General: No swelling or deformity. Right lower leg: No edema. Left lower leg: No edema. Comments: Normal gait and station   Skin:     General: Skin is warm and dry. Findings: No rash. Neurological:      Mental Status: He is alert and oriented to person, place, and time. Cranial Nerves: No cranial nerve deficit. Psychiatric:         Behavior: Behavior normal.         Judgment: Judgment normal.         Data:  Heart Cath 5/19  Conclusions    Severe double vessel disease with subacute thrombotic occlusion of   proximal circumflex with faint distal collaterals and diffuse proximal mid   and distal LAD disease. Severe LV dysfunction with moderate left ventricular dilation and markedly   elevated LV end-diastolic pressures. Successful PCI to proximal circumflex, proximal mid and distal LAD with   drug-eluting stents    Recommendations    Medical management   Heart failure management   Reevaluate LV function in 3 months with consideration for ICD if ejection   fraction less than 35%. evidence of overt heart failure,angina or dysrhythmia. Plan    Return in about 7 months (around 7/15/2021). OK to take an extra Lasix for weight gain over 3lbs in 24 hours. If weight is not improving by the next day, call the office.    - Weigh daily and report weight gain of 3lbs or more in 24hrs or 5lbs in one week. - Call for increasing shortness of breath or increasing swelling in feet and legs. (This could mean you are retaining too much fluid)  - 2000mg low sodium diet  - Fluid restriction of 1500ml per day (about 6 cups of fluid per day)    Call with any questionsor concerns  Follow up with Elizabeth Kruger MD for non cardiac problems  Report any new problems  Cardiovascular Fitness-Exercise as tolerated. Strive for 15 minutes of exercise most days of the week. Cardiac / HealthyDiet  Continue current medications as directed  Continue plan of treatment  It is always recommended that you bring your medicationsbottles with you to each visit - this is for your safety!        CHRIS Heaton

## 2020-12-15 NOTE — PATIENT INSTRUCTIONS
Return in about 7 months (around 7/15/2021). OK to take an extra Lasix for weight gain over 3lbs in 24 hours. If weight is not improving by the next day, call the office.    - Weigh daily and report weight gain of 3lbs or more in 24hrs or 5lbs in one week. - Call for increasing shortness of breath or increasing swelling in feet and legs.     (This could mean you are retaining too much fluid)  - 2000mg low sodium diet  - Fluid restriction of 1500ml per day (about 6 cups of fluid per day)

## 2020-12-22 ENCOUNTER — TELEPHONE (OUTPATIENT)
Dept: CARDIOLOGY | Age: 70
End: 2020-12-22

## 2020-12-22 RX ORDER — CARVEDILOL 3.12 MG/1
3.12 TABLET ORAL 2 TIMES DAILY
Qty: 180 TABLET | Refills: 3 | Status: ON HOLD | OUTPATIENT
Start: 2020-12-22 | End: 2021-11-09 | Stop reason: HOSPADM

## 2020-12-22 NOTE — TELEPHONE ENCOUNTER
Patients wife left message wnating to clarify dose of carvedilol. She said he has been taking 3.125 mg 1 tab BID and when they picked up the Rx it was for carvedilol 3.125 1/2 tablet BID. I can't find any documentation in the chart where it was changed. Please clarify dose and I will let patient know.

## 2020-12-22 NOTE — TELEPHONE ENCOUNTER
Spoke with patients wife and advised that he should take carvedilol 3.125 mg 1 tablet BID. She voiced understanding and new rx sent to pharmacy.

## 2021-01-11 RX ORDER — SILDENAFIL 100 MG/1
TABLET, FILM COATED ORAL
Qty: 6 TABLET | Refills: 5 | Status: SHIPPED | OUTPATIENT
Start: 2021-01-11 | End: 2021-06-22 | Stop reason: SDUPTHER

## 2021-01-13 RX ORDER — ATORVASTATIN CALCIUM 40 MG/1
TABLET, FILM COATED ORAL
Qty: 90 TABLET | Refills: 2 | Status: SHIPPED | OUTPATIENT
Start: 2021-01-13 | End: 2021-09-09

## 2021-01-13 RX ORDER — SACUBITRIL AND VALSARTAN 24; 26 MG/1; MG/1
TABLET, FILM COATED ORAL
Qty: 180 TABLET | Refills: 3 | Status: SHIPPED | OUTPATIENT
Start: 2021-01-13 | End: 2021-11-16

## 2021-01-18 DIAGNOSIS — E34.9 TESTOSTERONE DEFICIENCY: ICD-10-CM

## 2021-01-18 RX ORDER — TESTOSTERONE CYPIONATE 200 MG/ML
INJECTION INTRAMUSCULAR
Qty: 2 ML | Refills: 5 | Status: SHIPPED | OUTPATIENT
Start: 2021-01-18 | End: 2021-09-29 | Stop reason: ALTCHOICE

## 2021-02-17 DIAGNOSIS — F43.10 PTSD (POST-TRAUMATIC STRESS DISORDER): ICD-10-CM

## 2021-02-17 RX ORDER — DULOXETIN HYDROCHLORIDE 60 MG/1
CAPSULE, DELAYED RELEASE ORAL
Qty: 90 CAPSULE | Refills: 3 | Status: SHIPPED | OUTPATIENT
Start: 2021-02-17 | End: 2021-09-29

## 2021-02-17 RX ORDER — PENTOXIFYLLINE 400 MG/1
TABLET, EXTENDED RELEASE ORAL
Qty: 270 TABLET | Refills: 1 | Status: SHIPPED | OUTPATIENT
Start: 2021-02-17 | End: 2021-06-30 | Stop reason: SDUPTHER

## 2021-02-18 DIAGNOSIS — I73.9 PERIPHERAL VASCULAR DISEASE (HCC): ICD-10-CM

## 2021-02-18 DIAGNOSIS — Z86.79 HISTORY OF ACUTE CONGESTIVE HEART FAILURE: ICD-10-CM

## 2021-02-18 RX ORDER — FUROSEMIDE 40 MG/1
40 TABLET ORAL DAILY
Qty: 180 TABLET | Refills: 1 | Status: SHIPPED | OUTPATIENT
Start: 2021-02-18 | End: 2021-06-30 | Stop reason: SDUPTHER

## 2021-03-12 ENCOUNTER — TELEPHONE (OUTPATIENT)
Dept: FAMILY MEDICINE CLINIC | Age: 71
End: 2021-03-12

## 2021-03-12 DIAGNOSIS — G47.30 SLEEP APNEA, UNSPECIFIED TYPE: Primary | ICD-10-CM

## 2021-03-15 ENCOUNTER — OFFICE VISIT (OUTPATIENT)
Dept: NEUROLOGY | Age: 71
End: 2021-03-15
Payer: COMMERCIAL

## 2021-03-15 ENCOUNTER — TELEPHONE (OUTPATIENT)
Dept: FAMILY MEDICINE CLINIC | Age: 71
End: 2021-03-15

## 2021-03-15 VITALS
HEART RATE: 61 BPM | HEIGHT: 76 IN | SYSTOLIC BLOOD PRESSURE: 132 MMHG | RESPIRATION RATE: 16 BRPM | DIASTOLIC BLOOD PRESSURE: 79 MMHG | WEIGHT: 241 LBS | BODY MASS INDEX: 29.35 KG/M2

## 2021-03-15 DIAGNOSIS — G47.33 SLEEP APNEA, OBSTRUCTIVE: Primary | ICD-10-CM

## 2021-03-15 PROCEDURE — 1036F TOBACCO NON-USER: CPT | Performed by: PSYCHIATRY & NEUROLOGY

## 2021-03-15 PROCEDURE — 99203 OFFICE O/P NEW LOW 30 MIN: CPT | Performed by: PSYCHIATRY & NEUROLOGY

## 2021-03-15 PROCEDURE — 3017F COLORECTAL CA SCREEN DOC REV: CPT | Performed by: PSYCHIATRY & NEUROLOGY

## 2021-03-15 PROCEDURE — 1123F ACP DISCUSS/DSCN MKR DOCD: CPT | Performed by: PSYCHIATRY & NEUROLOGY

## 2021-03-15 PROCEDURE — G8427 DOCREV CUR MEDS BY ELIG CLIN: HCPCS | Performed by: PSYCHIATRY & NEUROLOGY

## 2021-03-15 PROCEDURE — 4040F PNEUMOC VAC/ADMIN/RCVD: CPT | Performed by: PSYCHIATRY & NEUROLOGY

## 2021-03-15 PROCEDURE — G8482 FLU IMMUNIZE ORDER/ADMIN: HCPCS | Performed by: PSYCHIATRY & NEUROLOGY

## 2021-03-15 PROCEDURE — G8417 CALC BMI ABV UP PARAM F/U: HCPCS | Performed by: PSYCHIATRY & NEUROLOGY

## 2021-03-15 NOTE — PROGRESS NOTES
[facility]  [address] [place] [address] Drive, [address] [address]  Phone [phone]  [phone]     [facility] SLEEP NEW PATIENT VISIT        Patient: Jorje Charles  :    Age:  65 y.o. MRN:  [id number]  Account #:  [de-identified]  Today:  3/15/21    Referring Provider:  Jaime Lopez M.D. Consulting Provider: Hari Tolbert M.D.    [address] [place] [address] Avenue:  Chief Complaint   Patient presents with    New Patient    Sleep Apnea       History Source: History obtained from the patient. PCP: Jaime Lopez MD    HISTORY OF PRESENT ILLNESS:  Jorje Charles is a 65y.o. year old man with a history of cardiovascular disease who was referred for a sleep evaluation. He has no difficulties initiating sleep. She snores and has been waking with snores and gasping. He sometimes has difficulty getting back to sleep. He usually feels rested when he wakens but gets drowsy during the daytime and will take a nap. He has had no vivid or active dreams. PAST MEDICAL HITORY:    Past Medical History:   Diagnosis Date    Gout     History of acute congestive heart failure        Past Surgical History:   Procedure Laterality Date    CARDIAC CATHETERIZATION  2019    HERNIA REPAIR         Recent Hospitalizations  · None    Significant Injuries  · None    Habits  Renzo Sales reports that he quit smoking about 40 years ago. His smoking use included cigarettes. He smoked 1.00 pack per day. He has never used smokeless tobacco. He reports that he does not drink alcohol or use drugs.     Current Outpatient Medications   Medication Sig Dispense Refill    furosemide (LASIX) 40 MG tablet TAKE 1 TABLET BY MOUTH DAILY TAKE ADDITIOINAL TAB FOR WEIGHT GAIN OF 3LBS OR MORE IN 24  tablet 1    pentoxifylline (TRENTAL) 400 MG extended release tablet TAKE 1 TABLET BY MOUTH 3 TIMES DAILY WITH MEALS 270 tablet 1    DULoxetine (CYMBALTA) 60 MG extended release capsule TAKE 1 CAPSULE BY MOUTH EVERY DAY 90 capsule 3    testosterone cypionate (DEPOTESTOTERONE CYPIONATE) 200 MG/ML injection INJECT 1 ML (200 MG) AS DIRECTED EVERY 14 DAYS 2 mL 5    ENTRESTO 24-26 MG per tablet TAKE 1 TABLET BY MOUTH TWICE A  tablet 3    atorvastatin (LIPITOR) 40 MG tablet TAKE 1 TABLET BY MOUTH EVERY DAY AT NIGHT 90 tablet 2    sildenafil (VIAGRA) 100 MG tablet TAKE 1 TABLET BY MOUTH AS NEEDED FOR ERECTILE DYSFUNCTION 6 tablet 5    carvedilol (COREG) 3.125 MG tablet Take 1 tablet by mouth 2 times daily 180 tablet 3    nitroGLYCERIN (NITROSTAT) 0.4 MG SL tablet up to max of 3 total doses. If no relief after 1 dose, call 911. 25 tablet 1    isosorbide mononitrate (IMDUR) 30 MG extended release tablet Take 1 tablet by mouth daily 90 tablet 3    aspirin 81 MG chewable tablet TAKE 1 TABLET BY MOUTH EVERY DAY 90 tablet 3    clopidogrel (PLAVIX) 75 MG tablet TAKE 1 TABLET BY MOUTH EVERY DAY 90 tablet 3    Needles & Syringes MISC 1 each by Does not apply route daily 3 cc syringes with 1 1/2 g needles for injection. 4 each 3    Omega-3 Fatty Acids (FISH OIL PO) Take by mouth daily      Multiple Vitamins-Minerals (ONE DAILY MULTIVITAMIN MEN PO) Take by mouth daily       No current facility-administered medications for this visit. 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 REVIEW OF SYSTEMS:  Review of Systems   Constitutional: Negative for chills and fever. HENT: Positive for hearing loss and tinnitus. Eyes: Negative for photophobia and visual disturbance. Respiratory: Negative for cough. Cardiovascular: Negative for chest pain and palpitations.    Gastrointestinal: Negative for nausea and Facial movements are symmetrical without weakness   VIII Hearing is intact   XII No tongue atrophy or fasciculations. Normal tongue protrusion. No tongue weakness  Motor:  Normal strength in both upper and lower extremities. Normal muscle tone and bulk. Deep tendon reflexes are intact and symmetrical in both upper and lower extremities. Lujan's signs are absent bilaterally. There is no ankle clonus on either side. Sensation:  Sensation is intact to light touch and vibration in all extremities. Coordination:  Rapid alternating movements are normal in both upper and lower extremities. Finger to nose testing is unimpaired bilaterally. Gait:  Normal station and gait. IMPRESSION:    ICD-10-CM    1. Sleep apnea, obstructive  G47.33 Baseline Diagnostic Sleep Study     Ambulatory referral to Sleep Medicine     I discussed the diagnosis of obstructive sleep apnea with Renzo Lujan including thepathophysiology (namely the mechanism of breathing and obstruction of upper airway, interruptions of sleep, hypoxemia, hypercapnia, and results of repetitive sympathetic activation), risks, evaluation, and treatment options. I discussed the risks of driving when drowsy and advised that Hal Giraldo not drive when drowsy and avoid sedatingmedications and respiratory suppressants. PLAN:  1. Orders Placed This Encounter   Procedures    Ambulatory referral to Sleep Medicine     Referral Priority:   Routine     Referral Type:   Consult for Advice and Opinion     Referral Reason:   Specialty Services Required     Number of Visits Requested:   1    Baseline Diagnostic Sleep Study     Standing Status:   Future     Standing Expiration Date:   3/15/2022     Order Specific Question:   Adult or Pediatric     Answer:   Adult Study (>7 Years)     2.  Sleep study, FU per protocol    Gus Hernadez M.D.

## 2021-03-16 ASSESSMENT — ENCOUNTER SYMPTOMS
BACK PAIN: 0
NAUSEA: 0
PHOTOPHOBIA: 0
COUGH: 0
VOMITING: 0

## 2021-03-19 DIAGNOSIS — E34.9 TESTOSTERONE DEFICIENCY: ICD-10-CM

## 2021-03-22 RX ORDER — SYRINGE WITH NEEDLE, 1 ML 25GX5/8"
SYRINGE, EMPTY DISPOSABLE MISCELLANEOUS
Qty: 100 EACH | Refills: 2 | Status: SHIPPED | OUTPATIENT
Start: 2021-03-22 | End: 2021-09-29

## 2021-03-30 ENCOUNTER — HOSPITAL ENCOUNTER (OUTPATIENT)
Dept: GENERAL RADIOLOGY | Age: 71
Discharge: HOME | End: 2021-03-30
Payer: COMMERCIAL

## 2021-03-30 ENCOUNTER — HOSPITAL ENCOUNTER (OUTPATIENT)
Dept: WOUND CARE | Age: 71
Discharge: HOME | End: 2021-03-30
Payer: COMMERCIAL

## 2021-03-30 VITALS
DIASTOLIC BLOOD PRESSURE: 69 MMHG | TEMPERATURE: 96.8 F | WEIGHT: 241 LBS | RESPIRATION RATE: 16 BRPM | HEART RATE: 74 BPM | SYSTOLIC BLOOD PRESSURE: 103 MMHG | BODY MASS INDEX: 29.97 KG/M2 | HEIGHT: 75 IN

## 2021-03-30 DIAGNOSIS — G62.9 NEUROPATHY: ICD-10-CM

## 2021-03-30 DIAGNOSIS — I50.22 CHRONIC SYSTOLIC CHF (CONGESTIVE HEART FAILURE) (HCC): ICD-10-CM

## 2021-03-30 DIAGNOSIS — L97.514: ICD-10-CM

## 2021-03-30 DIAGNOSIS — L97.514: Primary | ICD-10-CM

## 2021-03-30 LAB
ALBUMIN SERPL-MCNC: 4.1 G/DL (ref 3.5–5.2)
ALP BLD-CCNC: 85 U/L (ref 40–130)
ALT SERPL-CCNC: 18 U/L (ref 5–41)
ANION GAP SERPL CALCULATED.3IONS-SCNC: 11 MMOL/L (ref 7–19)
AST SERPL-CCNC: 14 U/L (ref 5–40)
BASOPHILS ABSOLUTE: 0.1 K/UL (ref 0–0.2)
BASOPHILS RELATIVE PERCENT: 1 % (ref 0–1)
BILIRUB SERPL-MCNC: 0.9 MG/DL (ref 0.2–1.2)
BUN BLDV-MCNC: 23 MG/DL (ref 8–23)
C-REACTIVE PROTEIN: 1.36 MG/DL (ref 0–0.5)
CALCIUM SERPL-MCNC: 9.4 MG/DL (ref 8.8–10.2)
CHLORIDE BLD-SCNC: 104 MMOL/L (ref 98–111)
CO2: 27 MMOL/L (ref 22–29)
CREAT SERPL-MCNC: 1 MG/DL (ref 0.5–1.2)
EOSINOPHILS ABSOLUTE: 0.3 K/UL (ref 0–0.6)
EOSINOPHILS RELATIVE PERCENT: 4.2 % (ref 0–5)
GFR AFRICAN AMERICAN: >59
GFR NON-AFRICAN AMERICAN: >60
GLUCOSE BLD-MCNC: 101 MG/DL (ref 74–109)
HCT VFR BLD CALC: 45.9 % (ref 42–52)
HEMOGLOBIN: 15.3 G/DL (ref 14–18)
IMMATURE GRANULOCYTES #: 0 K/UL
LYMPHOCYTES ABSOLUTE: 1.1 K/UL (ref 1.1–4.5)
LYMPHOCYTES RELATIVE PERCENT: 14.1 % (ref 20–40)
MCH RBC QN AUTO: 32.6 PG (ref 27–31)
MCHC RBC AUTO-ENTMCNC: 33.3 G/DL (ref 33–37)
MCV RBC AUTO: 97.7 FL (ref 80–94)
MONOCYTES ABSOLUTE: 0.7 K/UL (ref 0–0.9)
MONOCYTES RELATIVE PERCENT: 8.3 % (ref 0–10)
NEUTROPHILS ABSOLUTE: 5.6 K/UL (ref 1.5–7.5)
NEUTROPHILS RELATIVE PERCENT: 71.9 % (ref 50–65)
PDW BLD-RTO: 13.7 % (ref 11.5–14.5)
PLATELET # BLD: 170 K/UL (ref 130–400)
PMV BLD AUTO: 9.5 FL (ref 9.4–12.4)
POTASSIUM SERPL-SCNC: 5 MMOL/L (ref 3.5–5)
PREALBUMIN: 24 MG/DL (ref 20–40)
RBC # BLD: 4.7 M/UL (ref 4.7–6.1)
SEDIMENTATION RATE, ERYTHROCYTE: 13 MM/HR (ref 0–15)
SODIUM BLD-SCNC: 142 MMOL/L (ref 136–145)
TOTAL PROTEIN: 7.4 G/DL (ref 6.6–8.7)
VITAMIN D 25-HYDROXY: 29.3 NG/ML
WBC # BLD: 7.8 K/UL (ref 4.8–10.8)

## 2021-03-30 PROCEDURE — 97597 DBRDMT OPN WND 1ST 20 CM/<: CPT | Performed by: NURSE PRACTITIONER

## 2021-03-30 PROCEDURE — 73620 X-RAY EXAM OF FOOT: CPT

## 2021-03-30 PROCEDURE — 97597 DBRDMT OPN WND 1ST 20 CM/<: CPT

## 2021-03-30 PROCEDURE — 99213 OFFICE O/P EST LOW 20 MIN: CPT

## 2021-03-30 RX ORDER — LEVOFLOXACIN 500 MG/1
500 TABLET, FILM COATED ORAL DAILY
Qty: 10 TABLET | Refills: 0 | Status: SHIPPED | OUTPATIENT
Start: 2021-03-30 | End: 2021-04-09

## 2021-03-30 ASSESSMENT — VISUAL ACUITY: OU: 1

## 2021-03-30 NOTE — HOME CARE
[address] f: [phone] f: [phone] [email or web address]      Ordering Center:     [facility],[address]  1200 [facility] [address] [id number] [phone]  WOUND CARE Dept: [address] [facility] [phone]    Patient Information:      Radha Roblero [address] [facility] [address]   [phone]   : 1950  AGE: 65 y.o. GENDER: male   EPISODE DATE: 3/30/2021    Insurance:      PRIMARY INSURANCE:  Plan: BCBS OUT OF STATE  Coverage: BCBS  Effective Date: 2018  Group Number: [unfilled]  Subscriber Number: [id number] - (BX Traditional)    Payor/Plan Subscr  Sex Relation Sub. Ins. ID Effective Group Num   1.  20 Lauren Hilled 1950 Male Self [id number] 3/29/21 [id number]                                   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 (HCC) - Primary    Relevant Orders    VL LOWER EXTREMITY ARTERIAL SEGMENTAL PRESSURES W PPG    CBC Auto Differential    Comprehensive Metabolic Panel    C-Reactive Protein    Prealbumin    Sedimentation Rate    Vitamin D 25 Hydroxy    VL LOWER EXTREMITY ARTERIAL SEGMENTAL PRESSURES W PPG    XR FOOT RIGHT (2 VIEWS)    CBC Auto Differential    Comprehensive Metabolic Panel    C-Reactive Protein    Prealbumin    Vitamin D 25 Hydroxy    Sedimentation Rate    Chronic systolic CHF (congestive heart failure) (HCC)    Relevant Orders    Comprehensive Metabolic Panel          WOUNDS REQUIRING DRESSING SUPPLIES:     Wound 19 Toe (Comment  which one) Anterior;Right (Active)   Number of days: 681       Wound 21 Toe (Comment  which one) Right;Posterior wound 1- right 3rd toe neuropathic (Active)   Wound Image   21 1022   Wound Etiology Other 03/30/21 1022   Dressing Status Old drainage noted 03/30/21 1022   Wound Cleansed Soap and water 03/30/21 1022   Dressing/Treatment Moist to dry 03/30/21 1146   Offloading for Diabetic Foot Ulcers Felt or foam 03/30/21 1146   Wound Length (cm) 1 cm 03/30/21 1022   Wound Width (cm) 0.5 cm 03/30/21 1022   Wound Depth (cm) 0.3 cm 03/30/21 1022   Wound Surface Area (cm^2) 0.5 cm^2 03/30/21 1022   Wound Volume (cm^3) 0.15 cm^3 03/30/21 1022   Post-Procedure Length (cm) 1 cm 03/30/21 1146   Post-Procedure Width (cm) 0.5 cm 03/30/21 1146   Post-Procedure Depth (cm) 1 cm 03/30/21 1146   Post-Procedure Surface Area (cm^2) 0.5 cm^2 03/30/21 1146   Post-Procedure Volume (cm^3) 0.5 cm^3 03/30/21 1146   Wound Assessment Exposed structure bone 03/30/21 1022   Drainage Amount Moderate 03/30/21 1022   Drainage Description Serosanguinous 03/30/21 1022   Odor None 03/30/21 1022   Vicenta-wound Assessment Blanchable erythema; Intact 03/30/21 1022   Margins Defined edges 03/30/21 1022   Wound Thickness Description not for Pressure Injury Full thickness 03/30/21 1022   Number of days: 0          Supplies Requested :      WOUND #: 1   PRIMARY DRESSING:  None   Cover and Secure with: 2X2 gauze pad  Bulky roll gauze     FREQUENCY OF DRESSING CHANGES:  Daily       ADDITIONAL ITEMS:  [] Gloves Small  [x] Gloves Medium [] Gloves Large [] Gloves XLarge  [] Tape 1\" [x] Tape 2\" [] Tape 3\"  [x] Medipore Tape  [] Saline  [] Skin Prep   [] Adhesive Remover   [] Cotton Tip Applicators   [] Other:    Patient Wound(s) Debrided: [x] Yes if yes please add date 3/30/21   [] No    Debribement Type: Excisional/Sharp    Patient currently being seen by Home Health: [] Yes   [x] No    Duration for needed supplies:  []15  [x]30  []60  []90 Days    Electronically signed by CHRIS Moreno CNP on 3/30/2021 at 11:57 AM     Provider Information:      PROVIDER'S NAME: Saniya PEREZ    NPI: [id number]

## 2021-03-30 NOTE — PROGRESS NOTES
Patient Care Team:  Garret Burch MD as PCP - General (Family Medicine)  Garret Burch MD as PCP - [facility]  Hezzie Ahumada, MD as Consulting Physician (Interventional Cardiology)  Joseph Harmon MD as Consulting Physician (Neurology)    TODAY'S DATE:  3/30/2021     HISTORY of PRESENTILLNESS HPI   Burgess Mendez is a 65 y.o. male who presents today for wound evaluation. He reports he developed a wound on right foot. This started 2 week(s) ago. He has been treating this recurrent wound for 2 years however. In the past he saw Valeriano PEREZ in [facility] as well as [facility] at a vascular appointment. He believes this is not healing. He has been applying antibiotic ointment. He has not had  fever orchills. He has a history of neuropathy. I was able to visualize bone and discussed the possibility of amputation. The patient and family agreed with the plan of care therefore I am going to have Dr. Donaldo Blanton follow up in 2 weeks.    Wound Type:neuropathic  Wound Location:right 3rd toe  Modifying factors:chronic pressure, shear force and obesity    Patient Active Problem List   Diagnosis Code    Gout M10.9    Anxiety F41.9    Mixed hyperlipidemia E78.2    Neuropathy G62.9    Benign nodular prostatic hyperplasia without lower urinary tract symptoms N40.0    Peripheral vascular disease ([facility]) I73.9    Skin ulcer of third toe of right foot with necrosis of bone ([facility]) L97.514    Tinea pedis of left foot B35.3    Idiopathic peripheral neuropathy G60.9    Hearing aid worn Z97.4    Acute pain of right shoulder M25.511    NSTEMI (non-ST elevated myocardial infarction) ([facility]) I21.4    History of acute congestive heart failure Z86.79    AAA (abdominal aortic aneurysm) without rupture ([facility]) I71.4    Chronic systolic CHF (congestive heart failure) ([facility]) I50.22    Coronary artery disease involving native coronary artery of native heart without angina pectoris I25.10    Hypotension due to drugs I95.2    Open toe wound S91.109A       Renzo Mendoza is a 65 y.o. male with the following history reviewed and recorded in [facility]:    Current Outpatient Medications   Medication Sig Dispense Refill    levoFLOXacin (LEVAQUIN) 500 MG tablet Take 1 tablet by mouth daily for 10 days 10 tablet 0    furosemide (LASIX) 40 MG tablet TAKE 1 TABLET BY MOUTH DAILY TAKE ADDITIOINAL TAB FOR WEIGHT GAIN OF 3LBS OR MORE IN 24  tablet 1    DULoxetine (CYMBALTA) 60 MG extended release capsule TAKE 1 CAPSULE BY MOUTH EVERY DAY 90 capsule 3    ENTRESTO 24-26 MG per tablet TAKE 1 TABLET BY MOUTH TWICE A  tablet 3    atorvastatin (LIPITOR) 40 MG tablet TAKE 1 TABLET BY MOUTH EVERY DAY AT NIGHT 90 tablet 2    carvedilol (COREG) 3.125 MG tablet Take 1 tablet by mouth 2 times daily 180 tablet 3    isosorbide mononitrate (IMDUR) 30 MG extended release tablet Take 1 tablet by mouth daily 90 tablet 3    aspirin 81 MG chewable tablet TAKE 1 TABLET BY MOUTH EVERY DAY 90 tablet 3    clopidogrel (PLAVIX) 75 MG tablet TAKE 1 TABLET BY MOUTH EVERY DAY 90 tablet 3    Omega-3 Fatty Acids (FISH OIL PO) Take by mouth daily      Multiple Vitamins-Minerals (ONE DAILY MULTIVITAMIN MEN PO) Take by mouth daily      B-D 3CC LUER-MAURY SYR 47AD7-5/2 22G X 1-1/2\" 3 ML MISC USE WITH TESTOSTERONE INJECTION 100 each 2    pentoxifylline (TRENTAL) 400 MG extended release tablet TAKE 1 TABLET BY MOUTH 3 TIMES DAILY WITH MEALS (Patient taking differently: Take 400 mg by mouth 2 times daily (with meals) Do not crush or break.) 270 tablet 1    testosterone cypionate (DEPOTESTOTERONE CYPIONATE) 200 MG/ML injection INJECT 1 ML (200 MG) AS DIRECTED EVERY 14 DAYS 2 mL 5    sildenafil (VIAGRA) 100 MG tablet TAKE 1 TABLET BY MOUTH AS NEEDED FOR ERECTILE DYSFUNCTION 6 tablet 5    nitroGLYCERIN (NITROSTAT) 0.4 MG SL tablet up to max of 3 total doses.  If no relief after 1 dose, call 911. 25 tablet 1     No current facility-administered medications for this encounter. Allergies: Penicillins  Past Medical History:   Diagnosis Date    AAA (abdominal aortic aneurysm) (HCC)     CHF (congestive heart failure) (HCC)     Gout     History of acute congestive heart failure     Hyperlipidemia     Neuropathy involving both lower extremities        Past Surgical History:   Procedure Laterality Date    CARDIAC CATHETERIZATION  2019    HERNIA REPAIR       Family History   Problem Relation Age of Onset    Heart Surgery Brother     Heart Disease Brother      Social History     Tobacco Use    Smoking status: Former Smoker     Packs/day: 1.00     Types: Cigarettes     Quit date: 1980     Years since quittin.8    Smokeless tobacco: Never Used    Tobacco comment: quit over 25 yrs ago   Substance Use Topics    Alcohol use: No         Review of Systems    Review of Systems   Skin: Positive for wound. All other systems reviewed and are negative. All other review of systems are negative. Physical Exam    /69   Pulse 74   Temp 96.8 °F (36 °C) (Temporal)   Resp 16   Ht 6' 3\" (1.905 m)   Wt 241 lb (109.3 kg)   BMI 30.12 kg/m²     Physical Exam  Vitals signs reviewed. Exam conducted with a chaperone present. Constitutional:       Appearance: Normal appearance. He is obese. HENT:      Head: Normocephalic and atraumatic. Right Ear: External ear normal.      Left Ear: External ear normal.   Eyes:      General: Lids are normal. Lids are everted, no foreign bodies appreciated. Vision grossly intact. Gaze aligned appropriately. Cardiovascular:      Rate and Rhythm: Normal rate. Pulses:           Dorsalis pedis pulses are detected w/ Doppler on the right side and detected w/ Doppler on the left side. Posterior tibial pulses are detected w/ Doppler on the right side and detected w/ Doppler on the left side. Heart sounds: Normal heart sounds.    Pulmonary:      Effort: Pulmonary effort is normal.      Breath sounds: Normal breath sounds. Abdominal:      General: Bowel sounds are normal.   Musculoskeletal: Normal range of motion. General: Swelling present. Feet:    Feet:      Right foot:      Skin integrity: Ulcer present. Skin:     General: Skin is warm and dry. Capillary Refill: Capillary refill takes 2 to 3 seconds. Neurological:      Mental Status: He is alert and oriented to person, place, and time. Psychiatric:         Mood and Affect: Mood normal.         Behavior: Behavior normal.         Thought Content: Thought content normal.         Judgment: Judgment normal.             Post Debridement Measurements and Assessment:    The patientspain isPain Level: 0  . Wound is is unchanged. Please refer to nursing measurements and assessment regarding wound pre and postdebridement.     Wound 05/19/19 Toe (Comment  which one) Anterior;Right (Active)   Number of days: 681       Wound 03/30/21 Toe (Comment  which one) Right;Posterior wound 1- right 3rd toe neuropathic (Active)   Wound Image   03/30/21 1022   Wound Etiology Other 03/30/21 1022   Dressing Status Old drainage noted 03/30/21 1022   Wound Cleansed Soap and water 03/30/21 1022   Dressing/Treatment Moist to dry 03/30/21 1146   Offloading for Diabetic Foot Ulcers Felt or foam 03/30/21 1146   Wound Length (cm) 1 cm 03/30/21 1022   Wound Width (cm) 0.5 cm 03/30/21 1022   Wound Depth (cm) 0.3 cm 03/30/21 1022   Wound Surface Area (cm^2) 0.5 cm^2 03/30/21 1022   Wound Volume (cm^3) 0.15 cm^3 03/30/21 1022   Post-Procedure Length (cm) 1 cm 03/30/21 1146   Post-Procedure Width (cm) 0.5 cm 03/30/21 1146   Post-Procedure Depth (cm) 1 cm 03/30/21 1146   Post-Procedure Surface Area (cm^2) 0.5 cm^2 03/30/21 1146   Post-Procedure Volume (cm^3) 0.5 cm^3 03/30/21 1146   Wound Assessment Exposed structure bone 03/30/21 1022   Drainage Amount Moderate 03/30/21 1022   Drainage Description Serosanguinous 03/30/21 1022   Odor None 03/30/21 1022   Vicenta-wound Assessment Blanchable erythema; Intact 03/30/21 1022   Margins Defined edges 03/30/21 1022   Wound Thickness Description not for Pressure Injury Full thickness 03/30/21 1022   Number of days: 0            Debridement: Selective Debridement/Non-Excisional Debridement    Using curette the wound(s)/ulcer(s) was/were sharply debrided down through and including the removal of epidermis and dermis. Devitalized Tissue Debrided:  fibrin, biofilm, slough and exudate    Pre Debridement Measurements:  Are located in the Wound/Ulcer Documentation Flow Sheet    Wound/Ulcer #: 1    Post Debridement Measurements:  Wound/Ulcer Descriptions are Pre Debridement except measurements:          Percent of Wound(s)/Ulcer(s) Debrided: 100%    Total Surface Area Debrided:  0.5 sq cm     Diabetic/Pressure/Non Pressure Ulcers only:  Ulcer: N/A     Estimated Blood Loss:  Minimal    Hemostasis Achieved:  by pressure    Procedural Pain:  0  / 10     Post Procedural Pain:  0 / 10     Response to treatment:  Well tolerated by patient. Assessment    1. Skin ulcer of third toe of right foot with necrosis of bone ([address]    2. Chronic systolic CHF (congestive heart failure) ([address]    3. Neuropathy          Plan    1. MEHNAZ  2. Xray  3. Lab    Planfor wound - Dress per physician order  Treatment:     Compression : No   Offloading : Yes    Dressing Orders:  Right toe wound: Soap and water wash, apply saline moistened gauze to the wound bed secure with dry gauze and medipore tape twice daily. Discussed importance of nutrition, offloading, wound care, and plan of care. Patient understanding and questions answered. I spent a total of  60 minutes face to face with the patient. Over 75% of that time was spent on counseling and care coordination. Patient was told that if symptoms worsen or new symptoms develop they are to go to the emergency department immediately. Patient was educated on diagnosis and treatment plan.   All of patient's questions were answered, and the patient understands the discharge plan. Discussed appropriate home care of this wound. Wound redressed. Patient instructions were given.   Recommend no smoking  Offloading instructions given

## 2021-04-08 ENCOUNTER — HOSPITAL ENCOUNTER (OUTPATIENT)
Dept: VASCULAR LAB | Age: 71
Discharge: HOME | End: 2021-04-08
Payer: COMMERCIAL

## 2021-04-08 ENCOUNTER — HOSPITAL ENCOUNTER (OUTPATIENT)
Dept: WOUND CARE | Age: 71
Discharge: HOME | End: 2021-04-08
Payer: COMMERCIAL

## 2021-04-08 VITALS
HEART RATE: 84 BPM | RESPIRATION RATE: 18 BRPM | DIASTOLIC BLOOD PRESSURE: 83 MMHG | TEMPERATURE: 97.1 F | SYSTOLIC BLOOD PRESSURE: 131 MMHG

## 2021-04-08 DIAGNOSIS — L97.514: ICD-10-CM

## 2021-04-08 DIAGNOSIS — Z01.818 PRE-OP TESTING: Primary | ICD-10-CM

## 2021-04-08 PROCEDURE — 99213 OFFICE O/P EST LOW 20 MIN: CPT | Performed by: SURGERY

## 2021-04-08 PROCEDURE — 99213 OFFICE O/P EST LOW 20 MIN: CPT

## 2021-04-08 PROCEDURE — 93923 UPR/LXTR ART STDY 3+ LVLS: CPT

## 2021-04-08 RX ORDER — NITROGLYCERIN 0.4 MG/1
TABLET SUBLINGUAL
Qty: 25 TABLET | Refills: 1 | Status: SHIPPED | OUTPATIENT
Start: 2021-04-08 | End: 2021-05-27 | Stop reason: SDUPTHER

## 2021-04-13 ENCOUNTER — HOSPITAL ENCOUNTER (OUTPATIENT)
Dept: PREADMISSION TESTING | Age: 71
Discharge: HOME | End: 2021-04-17
Payer: COMMERCIAL

## 2021-04-13 VITALS — HEIGHT: 75 IN | WEIGHT: 239 LBS | BODY MASS INDEX: 29.72 KG/M2

## 2021-04-13 DIAGNOSIS — Z01.818 PRE-OP TESTING: ICD-10-CM

## 2021-04-13 DIAGNOSIS — L97.514: ICD-10-CM

## 2021-04-13 LAB
EKG P AXIS: 37 DEGREES
EKG P-R INTERVAL: 166 MS
EKG Q-T INTERVAL: 406 MS
EKG QRS DURATION: 156 MS
EKG QTC CALCULATION (BAZETT): 418 MS
EKG T AXIS: 49 DEGREES
SARS-COV-2, PCR: NOT DETECTED

## 2021-04-13 PROCEDURE — 93010 ELECTROCARDIOGRAM REPORT: CPT | Performed by: INTERNAL MEDICINE

## 2021-04-13 PROCEDURE — U0003 INFECTIOUS AGENT DETECTION BY NUCLEIC ACID (DNA OR RNA); SEVERE ACUTE RESPIRATORY SYNDROME CORONAVIRUS 2 (SARS-COV-2) (CORONAVIRUS DISEASE [COVID-19]), AMPLIFIED PROBE TECHNIQUE, MAKING USE OF HIGH THROUGHPUT TECHNOLOGIES AS DESCRIBED BY CMS-2020-01-R: HCPCS

## 2021-04-13 PROCEDURE — 93005 ELECTROCARDIOGRAM TRACING: CPT | Performed by: ANESTHESIOLOGY

## 2021-04-13 PROCEDURE — U0005 INFEC AGEN DETEC AMPLI PROBE: HCPCS

## 2021-04-13 PROCEDURE — 87641 MR-STAPH DNA AMP PROBE: CPT

## 2021-04-14 LAB — MRSA SCREEN RT-PCR: NOT DETECTED

## 2021-04-14 NOTE — PROGRESS NOTES
On 21 at 8:29 AM, I reviewed the following instructions in detail several times with the patient's wife, Kim Jiménez. She repeated these instructions appropriately and acknowledged understanding. I have also e-scribed Bactroban (per protocol) to patient's pharmacy and given instruction for pick-up and use of medication as seen below. She again acknowledged understanding. [facility]    Surgery Directions    1. Report to the outpatient registration at [facility] on Thursday 4/15/21, Surgery will call the day before, after 2:00PM with time of arrival.   2. Nothing to eat or drink after midnight the night before the procedure. 3. Please take all medications as normally scheduled to take, including heart and blood pressure medicines with a sip of water. 4. Do not take Lasix, insulin, or any diabetic medicine the morning of the procedure. If you take insulin, you may only take 1/2 of any scheduled nightly dose, and none the morning of the procedure. You may take all regularly scheduled heart, cholesterol, and blood pressure medicines with a sip of water. 5. If you take Glucophage, Metformin, Actos Plus Met, or Glucovance,please tell our nurse. it may interfere with some of the medications given during surgery. 6. Hold Plavix/Coumadin for 0 days prior to surgery. 7. If you have sleep apnea and require C-PAP, please bring this with you to the hospital.  8. Bring a list of all of your allergies and medications with you to the hospital.  9. Please let our nurse know if you have had an allergy to iodine, shellfish, or x-ray dye. 10. Let the nurse know if you take any of the followin. Over the counter herbal supplements  2. Diclofenec, indomethacin, ketoprofen, Caridopa/levadopa, naproxen, sulindac, piroxicam, glucosamine, Chondrotin, cocchine, or methotrexate. 11. Plan to stay at the hospital for 4 - 6 hours before being released  by the physician.  You will need someone to drive you home after the
without rupture (HCC)    Chronic systolic CHF (congestive heart failure) (HCC)    Coronary artery disease involving native coronary artery of native heart without angina pectoris    Hypotension due to drugs    Open toe wound          Plan:          Plan for wound - Dress per physician order  Treatment:     Compression : No   Offloading : Yes   Dressing : gauze   Additional Therapy :      1. Discussed appropriate home care of this wound. Wound redressed. 2. Patient instructions were given. 3. Follow up: 1 week(s) after surgery. Discussed with patient in detail third toe amputation. All risks, benefits and alternatives discussed with patient. She understand and agree with procedure.                        Electronically signed by Mana Garcia DO on 4/14/2021 at 6:38 PM

## 2021-04-15 ENCOUNTER — ANESTHESIA EVENT (OUTPATIENT)
Dept: OPERATING ROOM | Age: 71
End: 2021-04-15
Payer: COMMERCIAL

## 2021-04-15 ENCOUNTER — ANESTHESIA (OUTPATIENT)
Dept: OPERATING ROOM | Age: 71
End: 2021-04-15
Payer: COMMERCIAL

## 2021-04-15 ENCOUNTER — HOSPITAL ENCOUNTER (OUTPATIENT)
Age: 71
Setting detail: OUTPATIENT SURGERY
Discharge: HOME | End: 2021-04-15
Attending: SURGERY | Admitting: SURGERY
Payer: COMMERCIAL

## 2021-04-15 VITALS — SYSTOLIC BLOOD PRESSURE: 101 MMHG | DIASTOLIC BLOOD PRESSURE: 55 MMHG | OXYGEN SATURATION: 93 % | TEMPERATURE: 98.6 F

## 2021-04-15 VITALS
HEART RATE: 71 BPM | DIASTOLIC BLOOD PRESSURE: 70 MMHG | SYSTOLIC BLOOD PRESSURE: 108 MMHG | WEIGHT: 239 LBS | HEIGHT: 75 IN | OXYGEN SATURATION: 100 % | BODY MASS INDEX: 29.72 KG/M2 | TEMPERATURE: 97.9 F | RESPIRATION RATE: 18 BRPM

## 2021-04-15 DIAGNOSIS — L97.514: Primary | ICD-10-CM

## 2021-04-15 PROCEDURE — 3700000000 HC ANESTHESIA ATTENDED CARE: Performed by: SURGERY

## 2021-04-15 PROCEDURE — 3700000001 HC ADD 15 MINUTES (ANESTHESIA): Performed by: SURGERY

## 2021-04-15 PROCEDURE — 7100000010 HC PHASE II RECOVERY - FIRST 15 MIN: Performed by: SURGERY

## 2021-04-15 PROCEDURE — 6360000002 HC RX W HCPCS: Performed by: NURSE ANESTHETIST, CERTIFIED REGISTERED

## 2021-04-15 PROCEDURE — 2580000003 HC RX 258: Performed by: NURSE ANESTHETIST, CERTIFIED REGISTERED

## 2021-04-15 PROCEDURE — 7100000001 HC PACU RECOVERY - ADDTL 15 MIN: Performed by: SURGERY

## 2021-04-15 PROCEDURE — 2709999900 HC NON-CHARGEABLE SUPPLY: Performed by: SURGERY

## 2021-04-15 PROCEDURE — 3600000003 HC SURGERY LEVEL 3 BASE: Performed by: SURGERY

## 2021-04-15 PROCEDURE — 7100000000 HC PACU RECOVERY - FIRST 15 MIN: Performed by: SURGERY

## 2021-04-15 PROCEDURE — 28820 AMPUTATION OF TOE: CPT | Performed by: SURGERY

## 2021-04-15 PROCEDURE — 88305 TISSUE EXAM BY PATHOLOGIST: CPT

## 2021-04-15 PROCEDURE — 3600000013 HC SURGERY LEVEL 3 ADDTL 15MIN: Performed by: SURGERY

## 2021-04-15 PROCEDURE — 88311 DECALCIFY TISSUE: CPT

## 2021-04-15 PROCEDURE — 2500000003 HC RX 250 WO HCPCS: Performed by: NURSE ANESTHETIST, CERTIFIED REGISTERED

## 2021-04-15 RX ORDER — FENTANYL CITRATE 50 UG/ML
50 INJECTION, SOLUTION INTRAMUSCULAR; INTRAVENOUS
Status: DISCONTINUED | OUTPATIENT
Start: 2021-04-15 | End: 2021-04-15 | Stop reason: HOSPADM

## 2021-04-15 RX ORDER — SODIUM CHLORIDE 0.9 % (FLUSH) 0.9 %
5-40 SYRINGE (ML) INJECTION PRN
Status: DISCONTINUED | OUTPATIENT
Start: 2021-04-15 | End: 2021-04-15 | Stop reason: HOSPADM

## 2021-04-15 RX ORDER — SODIUM CHLORIDE, SODIUM LACTATE, POTASSIUM CHLORIDE, CALCIUM CHLORIDE 600; 310; 30; 20 MG/100ML; MG/100ML; MG/100ML; MG/100ML
INJECTION, SOLUTION INTRAVENOUS CONTINUOUS PRN
Status: DISCONTINUED | OUTPATIENT
Start: 2021-04-15 | End: 2021-04-15 | Stop reason: SDUPTHER

## 2021-04-15 RX ORDER — DIPHENHYDRAMINE HYDROCHLORIDE 50 MG/ML
12.5 INJECTION INTRAMUSCULAR; INTRAVENOUS
Status: DISCONTINUED | OUTPATIENT
Start: 2021-04-15 | End: 2021-04-15 | Stop reason: HOSPADM

## 2021-04-15 RX ORDER — SCOLOPAMINE TRANSDERMAL SYSTEM 1 MG/1
1 PATCH, EXTENDED RELEASE TRANSDERMAL ONCE
Status: DISCONTINUED | OUTPATIENT
Start: 2021-04-15 | End: 2021-04-15 | Stop reason: HOSPADM

## 2021-04-15 RX ORDER — LIDOCAINE HYDROCHLORIDE 10 MG/ML
INJECTION, SOLUTION EPIDURAL; INFILTRATION; INTRACAUDAL; PERINEURAL PRN
Status: DISCONTINUED | OUTPATIENT
Start: 2021-04-15 | End: 2021-04-15 | Stop reason: SDUPTHER

## 2021-04-15 RX ORDER — METOCLOPRAMIDE HYDROCHLORIDE 5 MG/ML
10 INJECTION INTRAMUSCULAR; INTRAVENOUS
Status: DISCONTINUED | OUTPATIENT
Start: 2021-04-15 | End: 2021-04-15 | Stop reason: HOSPADM

## 2021-04-15 RX ORDER — MORPHINE SULFATE 4 MG/ML
4 INJECTION, SOLUTION INTRAMUSCULAR; INTRAVENOUS EVERY 5 MIN PRN
Status: DISCONTINUED | OUTPATIENT
Start: 2021-04-15 | End: 2021-04-15 | Stop reason: HOSPADM

## 2021-04-15 RX ORDER — GLYCOPYRROLATE 0.2 MG/ML
INJECTION INTRAMUSCULAR; INTRAVENOUS PRN
Status: DISCONTINUED | OUTPATIENT
Start: 2021-04-15 | End: 2021-04-15 | Stop reason: SDUPTHER

## 2021-04-15 RX ORDER — MORPHINE SULFATE 4 MG/ML
2 INJECTION, SOLUTION INTRAMUSCULAR; INTRAVENOUS EVERY 5 MIN PRN
Status: DISCONTINUED | OUTPATIENT
Start: 2021-04-15 | End: 2021-04-15 | Stop reason: HOSPADM

## 2021-04-15 RX ORDER — HYDRALAZINE HYDROCHLORIDE 20 MG/ML
5 INJECTION INTRAMUSCULAR; INTRAVENOUS EVERY 10 MIN PRN
Status: DISCONTINUED | OUTPATIENT
Start: 2021-04-15 | End: 2021-04-15 | Stop reason: HOSPADM

## 2021-04-15 RX ORDER — SODIUM CHLORIDE 9 MG/ML
25 INJECTION, SOLUTION INTRAVENOUS PRN
Status: DISCONTINUED | OUTPATIENT
Start: 2021-04-15 | End: 2021-04-15 | Stop reason: HOSPADM

## 2021-04-15 RX ORDER — MIDAZOLAM HYDROCHLORIDE 1 MG/ML
2 INJECTION INTRAMUSCULAR; INTRAVENOUS
Status: DISCONTINUED | OUTPATIENT
Start: 2021-04-15 | End: 2021-04-15 | Stop reason: HOSPADM

## 2021-04-15 RX ORDER — HYDROMORPHONE HYDROCHLORIDE 1 MG/ML
0.25 INJECTION, SOLUTION INTRAMUSCULAR; INTRAVENOUS; SUBCUTANEOUS EVERY 5 MIN PRN
Status: DISCONTINUED | OUTPATIENT
Start: 2021-04-15 | End: 2021-04-15 | Stop reason: HOSPADM

## 2021-04-15 RX ORDER — PROMETHAZINE HYDROCHLORIDE 25 MG/ML
6.25 INJECTION, SOLUTION INTRAMUSCULAR; INTRAVENOUS
Status: DISCONTINUED | OUTPATIENT
Start: 2021-04-15 | End: 2021-04-15 | Stop reason: HOSPADM

## 2021-04-15 RX ORDER — HYDROMORPHONE HYDROCHLORIDE 1 MG/ML
0.5 INJECTION, SOLUTION INTRAMUSCULAR; INTRAVENOUS; SUBCUTANEOUS EVERY 5 MIN PRN
Status: DISCONTINUED | OUTPATIENT
Start: 2021-04-15 | End: 2021-04-15 | Stop reason: HOSPADM

## 2021-04-15 RX ORDER — SODIUM CHLORIDE 0.9 % (FLUSH) 0.9 %
5-40 SYRINGE (ML) INJECTION EVERY 12 HOURS SCHEDULED
Status: DISCONTINUED | OUTPATIENT
Start: 2021-04-15 | End: 2021-04-15 | Stop reason: HOSPADM

## 2021-04-15 RX ORDER — HYDROCODONE BITARTRATE AND ACETAMINOPHEN 5; 325 MG/1; MG/1
1 TABLET ORAL EVERY 6 HOURS PRN
Qty: 15 TABLET | Refills: 0 | Status: SHIPPED | OUTPATIENT
Start: 2021-04-15 | End: 2021-04-20

## 2021-04-15 RX ORDER — VANCOMYCIN HYDROCHLORIDE 1 G/20ML
INJECTION, POWDER, LYOPHILIZED, FOR SOLUTION INTRAVENOUS PRN
Status: DISCONTINUED | OUTPATIENT
Start: 2021-04-15 | End: 2021-04-15 | Stop reason: SDUPTHER

## 2021-04-15 RX ORDER — MEPERIDINE HYDROCHLORIDE 50 MG/ML
12.5 INJECTION INTRAMUSCULAR; INTRAVENOUS; SUBCUTANEOUS EVERY 5 MIN PRN
Status: DISCONTINUED | OUTPATIENT
Start: 2021-04-15 | End: 2021-04-15 | Stop reason: HOSPADM

## 2021-04-15 RX ORDER — LABETALOL HYDROCHLORIDE 5 MG/ML
5 INJECTION, SOLUTION INTRAVENOUS EVERY 10 MIN PRN
Status: DISCONTINUED | OUTPATIENT
Start: 2021-04-15 | End: 2021-04-15 | Stop reason: HOSPADM

## 2021-04-15 RX ORDER — EPHEDRINE SULFATE 50 MG/ML
INJECTION, SOLUTION INTRAVENOUS PRN
Status: DISCONTINUED | OUTPATIENT
Start: 2021-04-15 | End: 2021-04-15 | Stop reason: SDUPTHER

## 2021-04-15 RX ORDER — LIDOCAINE HYDROCHLORIDE 10 MG/ML
1 INJECTION, SOLUTION EPIDURAL; INFILTRATION; INTRACAUDAL; PERINEURAL
Status: DISCONTINUED | OUTPATIENT
Start: 2021-04-15 | End: 2021-04-15 | Stop reason: HOSPADM

## 2021-04-15 RX ORDER — PROPOFOL 10 MG/ML
INJECTION, EMULSION INTRAVENOUS PRN
Status: DISCONTINUED | OUTPATIENT
Start: 2021-04-15 | End: 2021-04-15 | Stop reason: SDUPTHER

## 2021-04-15 RX ORDER — SODIUM CHLORIDE, SODIUM LACTATE, POTASSIUM CHLORIDE, CALCIUM CHLORIDE 600; 310; 30; 20 MG/100ML; MG/100ML; MG/100ML; MG/100ML
INJECTION, SOLUTION INTRAVENOUS CONTINUOUS
Status: DISCONTINUED | OUTPATIENT
Start: 2021-04-15 | End: 2021-04-15 | Stop reason: HOSPADM

## 2021-04-15 RX ORDER — MORPHINE SULFATE 4 MG/ML
4 INJECTION, SOLUTION INTRAMUSCULAR; INTRAVENOUS
Status: DISCONTINUED | OUTPATIENT
Start: 2021-04-15 | End: 2021-04-15 | Stop reason: HOSPADM

## 2021-04-15 RX ADMIN — LIDOCAINE HYDROCHLORIDE 10 MG: 10 INJECTION, SOLUTION EPIDURAL; INFILTRATION; INTRACAUDAL; PERINEURAL at 07:39

## 2021-04-15 RX ADMIN — VANCOMYCIN HYDROCHLORIDE 1000 MG: 1 INJECTION, POWDER, LYOPHILIZED, FOR SOLUTION INTRAVENOUS at 07:52

## 2021-04-15 RX ADMIN — PHENYLEPHRINE HYDROCHLORIDE 50 MCG: 10 INJECTION INTRAVENOUS at 07:49

## 2021-04-15 RX ADMIN — SODIUM CHLORIDE, SODIUM LACTATE, POTASSIUM CHLORIDE, AND CALCIUM CHLORIDE: 600; 310; 30; 20 INJECTION, SOLUTION INTRAVENOUS at 07:36

## 2021-04-15 RX ADMIN — SODIUM CHLORIDE, SODIUM LACTATE, POTASSIUM CHLORIDE, AND CALCIUM CHLORIDE: 600; 310; 30; 20 INJECTION, SOLUTION INTRAVENOUS at 08:09

## 2021-04-15 RX ADMIN — PHENYLEPHRINE HYDROCHLORIDE 50 MCG: 10 INJECTION INTRAVENOUS at 07:52

## 2021-04-15 RX ADMIN — GLYCOPYRROLATE 0.2 MG: 0.2 INJECTION, SOLUTION INTRAMUSCULAR; INTRAVENOUS at 07:38

## 2021-04-15 RX ADMIN — PROPOFOL 140 MG: 10 INJECTION, EMULSION INTRAVENOUS at 07:38

## 2021-04-15 RX ADMIN — EPHEDRINE SULFATE 5 MG: 50 INJECTION INTRAMUSCULAR; INTRAVENOUS; SUBCUTANEOUS at 07:55

## 2021-04-15 RX ADMIN — EPHEDRINE SULFATE 5 MG: 50 INJECTION INTRAMUSCULAR; INTRAVENOUS; SUBCUTANEOUS at 07:51

## 2021-04-15 ASSESSMENT — PAIN SCALES - GENERAL
PAINLEVEL_OUTOF10: 0
PAINLEVEL_OUTOF10: 0

## 2021-04-15 ASSESSMENT — ENCOUNTER SYMPTOMS: SHORTNESS OF BREATH: 0

## 2021-04-15 ASSESSMENT — LIFESTYLE VARIABLES: SMOKING_STATUS: 0

## 2021-04-15 NOTE — ANESTHESIA PRE PROCEDURE
Department of Anesthesiology  Preprocedure Note       Name:  Curt Jim   Age:  66 y.o.  :  1950                                          MRN:  [id number]         Date:  4/15/2021      Surgeon: Angely Mike):  Good Matos DO    Procedure: Procedure(s):  RIGHT THIRD TOE AMPUTATION    Medications prior to admission:   Prior to Admission medications    Medication Sig Start Date End Date Taking? Authorizing Provider   mupirocin (BACTROBAN NASAL) 2 % nasal ointment Apply with Qtip in nostrils twice daily for 5 days prior to surgery 21  Yes Good Matos DO   B-D 3CC LUER-MAURY SYR 47VH9-5/2 22G X 1-2\" 3 ML MISC USE WITH TESTOSTERONE INJECTION 3/22/21  Yes Abraham Jay MD   furosemide (LASIX) 40 MG tablet TAKE 1 TABLET BY MOUTH DAILY TAKE ADDITIOINAL TAB FOR WEIGHT GAIN OF 3LBS OR MORE IN 24 HRS 21  Yes Abraham Jay MD   pentoxifylline (TRENTAL) 400 MG extended release tablet TAKE 1 TABLET BY MOUTH 3 TIMES DAILY WITH MEALS  Patient taking differently: Take 400 mg by mouth 2 times daily (with meals) Do not crush or break.  21  Yes Abraham Jay MD   DULoxetine (CYMBALTA) 60 MG extended release capsule TAKE 1 CAPSULE BY MOUTH EVERY DAY 21  Yes Abraham Jay MD   ENTRESTO 24-26 MG per tablet TAKE 1 TABLET BY MOUTH TWICE A DAY 21  Yes CHRIS Marsh   atorvastatin (LIPITOR) 40 MG tablet TAKE 1 TABLET BY MOUTH EVERY DAY AT NIGHT 21  Yes Abraham Jay MD   sildenafil (VIAGRA) 100 MG tablet TAKE 1 TABLET BY MOUTH AS NEEDED FOR ERECTILE DYSFUNCTION 21  Yes Abraham Jay MD   carvedilol (COREG) 3.125 MG tablet Take 1 tablet by mouth 2 times daily 20  Yes CHRIS Calderon   isosorbide mononitrate (IMDUR) 30 MG extended release tablet Take 1 tablet by mouth daily 12/15/20  Yes CHRIS Calderon   aspirin 81 MG chewable tablet TAKE 1 TABLET BY MOUTH EVERY DAY 20  Yes Abraham Jay MD   clopidogrel (PLAVIX) 75 MG tablet TAKE 1 TABLET BY MOUTH EVERY DAY Hypertension     Neuropathy involving both lower extremities        Past Surgical History:        Procedure Laterality Date    CARDIAC CATHETERIZATION  2019    HERNIA REPAIR         Social History:    Social History     Tobacco Use    Smoking status: Former Smoker     Packs/day: 1.00     Types: Cigarettes     Quit date: 1980     Years since quittin.9    Smokeless tobacco: Never Used    Tobacco comment: quit over 25 yrs ago   Substance Use Topics    Alcohol use: No                                Counseling given: Not Answered  Comment: quit over 25 yrs ago      Vital Signs (Current):   Vitals:    04/15/21 0625   BP: 131/78   Pulse: 86   Resp: 14   Temp: 98.2 °F (36.8 °C)   TempSrc: Tympanic   SpO2: 98%   Weight: 239 lb (108.4 kg)   Height: 6' 3\" (1.905 m)                                              BP Readings from Last 3 Encounters:   04/15/21 131/78   21 131/83   21 103/69       NPO Status: Time of last liquid consumption:                         Time of last solid consumption:                         Date of last liquid consumption: 21                        Date of last solid food consumption: 21    BMI:   Wt Readings from Last 3 Encounters:   04/15/21 239 lb (108.4 kg)   21 239 lb (108.4 kg)   21 241 lb (109.3 kg)     Body mass index is 29.87 kg/m².     CBC:   Lab Results   Component Value Date    WBC 7.8 2021    RBC 4.70 2021    HGB 15.3 2021    HCT 45.9 2021    MCV 97.7 2021    RDW 13.7 2021     2021       CMP:   Lab Results   Component Value Date     2021    K 5.0 2021    K 4.5 2019     2021    CO2 27 2021    BUN 23 2021    CREATININE 1.0 2021    GFRAA >59 2021    LABGLOM >60 2021    GLUCOSE 101 2021    PROT 7.4 2021    PROT 7.0 2014    CALCIUM 9.4 2021    BILITOT 0.9 2021    ALKPHOS 85 2021    AST 14 03/30/2021    ALT 18 03/30/2021       POC Tests: No results for input(s): POCGLU, POCNA, POCK, POCCL, POCBUN, POCHEMO, POCHCT in the last 72 hours. Coags:   Lab Results   Component Value Date    APTT 29.7 05/21/2019       HCG (If Applicable): No results found for: PREGTESTUR, PREGSERUM, HCG, HCGQUANT     ABGs:   Lab Results   Component Value Date    PHART 7.400 05/18/2019    PO2ART 61.0 05/18/2019    OEI6DAW 36.0 05/18/2019    MET7ROS 22.3 05/18/2019    BEART -2.0 05/18/2019    W2GZGOYC 91.0 05/18/2019        Type & Screen (If Applicable):  No results found for: LABABO, LABRH    Drug/Infectious Status (If Applicable):  No results found for: HIV, HEPCAB    COVID-19 Screening (If Applicable):   Lab Results   Component Value Date    COVID19 Not Detected 04/13/2021           Anesthesia Evaluation  Patient summary reviewed and Nursing notes reviewed no history of anesthetic complications:   Airway: Mallampati: II  TM distance: >3 FB   Neck ROM: full  Mouth opening: > = 3 FB Dental: normal exam         Pulmonary:Negative Pulmonary ROS and normal exam  breath sounds clear to auscultation      (-) shortness of breath and not a current smoker          Patient did not smoke on day of surgery. Cardiovascular:    (+) hypertension:, past MI:, CAD:, CABG/stent (4 stents ):, CHF:,     (-)  angina    NYHA Classification: I  ECG reviewed  Rhythm: regular  Rate: normal           Beta Blocker:  Not on Beta Blocker         Neuro/Psych:   Negative Neuro/Psych ROS  (+) neuromuscular disease:,    (-) seizures, CVA and depression/anxiety            GI/Hepatic/Renal: Neg GI/Hepatic/Renal ROS       (-) hiatal hernia and GERD       Endo/Other: Negative Endo/Other ROS   (+) blood dyscrasia: anticoagulation therapy:., .          Pt had PAT visit. Abdominal:       Abdomen: soft. Vascular:   + PVD, aortic or cerebral, . ROS comment: AAA being followed .                                Anesthesia Plan general     ASA 3     (Iv zofran within 30 min of closing )  Induction: intravenous. BIS  MIPS: Postoperative opioids intended and Prophylactic antiemetics administered. Anesthetic plan and risks discussed with patient. Use of blood products discussed with patient whom. Plan discussed with CRNA.     Attending anesthesiologist reviewed and agrees with Pre Eval content              Nicky Kam MD   4/15/2021

## 2021-04-15 NOTE — ANESTHESIA POSTPROCEDURE EVALUATION
Department of Anesthesiology  Postprocedure Note    Patient: David Jc  MRN: [id number]  YOB: 1950  Date of evaluation: 4/15/2021  Time:  8:31 AM     Procedure Summary     Date: 04/15/21 Room / Location: [address]    Anesthesia Start: 4081 Anesthesia Stop:     Procedure: RIGHT THIRD TOE AMPUTATION (Right ) Diagnosis: (X43.043)    Surgeons: Davie Ko DO Responsible Provider: CHRIS Mcclain    Anesthesia Type: general ASA Status: 3          Anesthesia Type: No value filed. Agustina Phase I: Agustina Score: 10    Agustina Phase II:      Last vitals: Reviewed and per EMR flowsheets.        Anesthesia Post Evaluation    Patient location during evaluation: PACU  Level of consciousness: awake  Pain score: 0  Airway patency: patent  Nausea & Vomiting: no nausea and no vomiting  Complications: no  Cardiovascular status: blood pressure returned to baseline  Respiratory status: acceptable

## 2021-04-15 NOTE — BRIEF OP NOTE
Brief Postoperative Note      Patient: Emmett Alex  YOB: 1950  MRN: [id number]    Date of Procedure: 4/15/2021    Pre-Op Diagnosis: L97.514    Post-Op Diagnosis: Same       Procedure(s):  RIGHT THIRD TOE AMPUTATION    Surgeon(s):  Dickson Pink DO    Assistant:  * No surgical staff found *    Anesthesia: Other    Estimated Blood Loss (mL): less than 50     Complications: None    Specimens:   ID Type Source Tests Collected by Time Destination   A : Right Third Toe Tissue Toe [address] [place] [address]  4/15/2021 0757        Implants:  * No implants in log *      Drains: * No LDAs found *    Findings: no infection at the amputation level.      Electronically signed by Dickson Pink DO on 4/15/2021 at 8:19 AM

## 2021-04-15 NOTE — H&P
Soap and water 03/30/21 1022   Dressing/Treatment Moist to dry 04/08/21 1625   Offloading for Diabetic Foot Ulcers Felt or foam 04/08/21 1625   Wound Length (cm) 0.8 cm 04/08/21 1523   Wound Width (cm) 0.3 cm 04/08/21 1523   Wound Depth (cm) 0.3 cm 04/08/21 1523   Wound Surface Area (cm^2) 0.24 cm^2 04/08/21 1523   Change in Wound Size % (l*w) 52 04/08/21 1523   Wound Volume (cm^3) 0.07 cm^3 04/08/21 1523   Wound Healing % 53 04/08/21 1523   Post-Procedure Length (cm) 1 cm 03/30/21 1146   Post-Procedure Width (cm) 0.5 cm 03/30/21 1146   Post-Procedure Depth (cm) 1 cm 03/30/21 1146   Post-Procedure Surface Area (cm^2) 0.5 cm^2 03/30/21 1146   Post-Procedure Volume (cm^3) 0.5 cm^3 03/30/21 1146   Distance Tunneling (cm) 0 cm 04/08/21 1523   Tunneling Position ___ O'Clock 0 04/08/21 1523   Undermining Starts ___ O'Clock 0 04/08/21 1523   Undermining Ends___ O'Clock 0 04/08/21 1523   Undermining Maxium Distance (cm) 0 04/08/21 1523   Wound Assessment Exposed structure bone 04/08/21 1523   Drainage Amount None 04/08/21 1523   Drainage Description Serosanguinous 03/30/21 1022   Odor None 04/08/21 1523   Vicenta-wound Assessment Blanchable erythema 04/08/21 1523   Margins Defined edges 04/08/21 1523   Wound Thickness Description not for Pressure Injury Full thickness 04/08/21 1523   Number of days: 15           The patients pain is   . Wound(s) has worsened. Please refer to nursing measurements and assessment regarding wound pre and post debridement.         Assessment:      Patient Active Problem List   Diagnosis    Gout    Anxiety    Mixed hyperlipidemia    Neuropathy    Benign nodular prostatic hyperplasia without lower urinary tract symptoms    Peripheral vascular disease ([address]    Skin ulcer of third toe of right foot with necrosis of bone ([address]    Tinea pedis of left foot    Idiopathic peripheral neuropathy    Hearing aid worn    Acute pain of right shoulder    NSTEMI (non-ST elevated myocardial

## 2021-04-15 NOTE — OP NOTE
Operative Note      Patient: Jerardo White  YOB: 1950  MRN: [id number]    Date of Procedure: 4/15/2021    Pre-Op Diagnosis: L97.514    Post-Op Diagnosis: Same       Procedure(s):  RIGHT THIRD TOE AMPUTATION    Surgeon(s):  Ruperto Quiros DO    Assistant:   * No surgical staff found *    Anesthesia: Other    Estimated Blood Loss (mL): less than 50     Complications: None    Specimens:   ID Type Source Tests Collected by Time Destination   A : [facility]        Implants:  * No implants in log *      Drains: * No LDAs found *      Findings:    1. The patient had pulsatile arterial bleeding at the level of amputation. 2.  There was no sign of infection at the level of amputation. Procedure in detail:    Informed consent was obtained. The patient was given intravenous antibiotics, brought to the operating room, and placed on the operating room table in the supine position. General anesthesia was achieved and the patient's right leg, foot, and toes were prepped and draped in the usual sterile fashion. A timeout was performed. An incision in the fish moth fashion  was made at the base of the toe with knife. The dissection was carried down to the base of the proximal phalynx  with sharp dissection forming plantar and dorsal flaps. The proximal phalynx and toe were removed and passed off the field as specimen. Bleeding digital branches were ligated using 5-0 Prolene. The area was irrigated with saline and hemostasis achieved with electrocautery. The wound was closed with 4-0 vicryl sutures and the skin was closed with 4-0 nylon sutures. Xeroform gauze, 4x4 fluffs, kerlix and an ace wrap was applied. He tolerated the procedure well and was brought to the recovery room in Good condition.       Electronically signed by Ruperto Quiros DO on 4/15/2021 at 8:20 AM

## 2021-04-22 ENCOUNTER — HOSPITAL ENCOUNTER (OUTPATIENT)
Dept: WOUND CARE | Age: 71
Discharge: HOME | End: 2021-04-22
Payer: COMMERCIAL

## 2021-04-22 VITALS
HEART RATE: 68 BPM | TEMPERATURE: 96.7 F | SYSTOLIC BLOOD PRESSURE: 115 MMHG | DIASTOLIC BLOOD PRESSURE: 73 MMHG | BODY MASS INDEX: 29.72 KG/M2 | WEIGHT: 239 LBS | RESPIRATION RATE: 18 BRPM | HEIGHT: 75 IN

## 2021-04-22 DIAGNOSIS — L97.514: ICD-10-CM

## 2021-04-22 PROCEDURE — 99213 OFFICE O/P EST LOW 20 MIN: CPT

## 2021-04-22 PROCEDURE — 99024 POSTOP FOLLOW-UP VISIT: CPT | Performed by: SURGERY

## 2021-04-22 NOTE — PROGRESS NOTES
[address]   Progress Note and Procedure Note      Nena Zuniga  AGE: 66 y.o. GENDER: male  : 1950  TODAY'S DATE:  2021    Subjective:        HISTORY of PRESENT ILLNESS KIKI Zuniga is a 66 y.o. male who presents today for wound evaluation. Wound Type:surgical  Wound Location:right 3rd toe amputation  Modifying factors:neuropathy    Patient Active Problem List   Diagnosis Code    Gout M10.9    Anxiety F41.9    Mixed hyperlipidemia E78.2    Neuropathy G62.9    Benign nodular prostatic hyperplasia without lower urinary tract symptoms N40.0    Peripheral vascular disease ([facility]) I73.9    Skin ulcer of third toe of right foot with necrosis of bone ([facility]) L97.514    Tinea pedis of left foot B35.3    Idiopathic peripheral neuropathy G60.9    Hearing aid worn Z97.4    Acute pain of right shoulder M25.511    NSTEMI (non-ST elevated myocardial infarction) ([facility]) I21.4    History of acute congestive heart failure Z86.79    AAA (abdominal aortic aneurysm) without rupture ([facility]) I71.4    Chronic systolic CHF (congestive heart failure) ([facility]) I50.22    Coronary artery disease involving native coronary artery of native heart without angina pectoris I25.10    Hypotension due to drugs I95.2    Open toe wound S91.109A    Pre-op testing Z01.818       ALLERGIES    Allergies   Allergen Reactions    Penicillins      Unknown what it does       Incision 04/15/21 Toe (Comment  which one) Anterior;Right (Active)   Wound Image   21 1454   Dressing Status Clean;Dry; Intact 21 1454   Incision Cleansed Cleansed with saline 21 1454   Dressing/Treatment Ace wrap 04/15/21 0856   Incision Length (cm) 0.2 21 1454   Incision Width (cm) 1.7 cm 21 1454   Incision Depth (cm) 0.1 cm 21 1454   Closure Sutures 21 1454   Margins Approximated 21 1454   Incision Assessment Dry 21 1454   Drainage Amount None 21 1454   Odor None 21 1454 Vicenta-incision Assessment Intact 04/22/21 1454   Number of days: 7       Objective:      /73   Pulse 68   Temp 96.7 °F (35.9 °C) (Temporal)   Resp 18   Ht 6' 3\" (1.905 m)   Wt 239 lb (108.4 kg)   BMI 29.87 kg/m²     Post Debridement Measurements and Assessment:    Wound 05/19/19 Toe (Comment  which one) Anterior;Right (Active)   Number of days: 704       Wound 03/30/21 Toe (Comment  which one) Right;Posterior wound 1- right 3rd toe neuropathic (Active)   Wound Image   04/08/21 1523   Wound Etiology Other 04/08/21 1523   Dressing Status New dressing applied 04/08/21 1625   Wound Cleansed Soap and water 03/30/21 1022   Dressing/Treatment Moist to dry 04/08/21 1625   Offloading for Diabetic Foot Ulcers Felt or foam 04/08/21 1625   Wound Length (cm) 0.8 cm 04/08/21 1523   Wound Width (cm) 0.3 cm 04/08/21 1523   Wound Depth (cm) 0.3 cm 04/08/21 1523   Wound Surface Area (cm^2) 0.24 cm^2 04/08/21 1523   Change in Wound Size % (l*w) 52 04/08/21 1523   Wound Volume (cm^3) 0.07 cm^3 04/08/21 1523   Wound Healing % 53 04/08/21 1523   Post-Procedure Length (cm) 1 cm 03/30/21 1146   Post-Procedure Width (cm) 0.5 cm 03/30/21 1146   Post-Procedure Depth (cm) 1 cm 03/30/21 1146   Post-Procedure Surface Area (cm^2) 0.5 cm^2 03/30/21 1146   Post-Procedure Volume (cm^3) 0.5 cm^3 03/30/21 1146   Distance Tunneling (cm) 0 cm 04/08/21 1523   Tunneling Position ___ O'Clock 0 04/08/21 1523   Undermining Starts ___ O'Clock 0 04/08/21 1523   Undermining Ends___ O'Clock 0 04/08/21 1523   Undermining Maxium Distance (cm) 0 04/08/21 1523   Wound Assessment Exposed structure bone 04/08/21 1523   Drainage Amount None 04/08/21 1523   Drainage Description Serosanguinous 03/30/21 1022   Odor None 04/08/21 1523   Vicenta-wound Assessment Blanchable erythema 04/08/21 1523   Margins Defined edges 04/08/21 1523   Wound Thickness Description not for Pressure Injury Full thickness 04/08/21 1523   Number of days: 23           The patients pain is   .  Wound(s) healing well. Please refer to nursing measurements and assessment regarding wound pre and post debridement. Assessment:      Patient Active Problem List   Diagnosis    Gout    Anxiety    Mixed hyperlipidemia    Neuropathy    Benign nodular prostatic hyperplasia without lower urinary tract symptoms    Peripheral vascular disease ([address]    Skin ulcer of third toe of right foot with necrosis of bone ([address]    Tinea pedis of left foot    Idiopathic peripheral neuropathy    Hearing aid worn    Acute pain of right shoulder    NSTEMI (non-ST elevated myocardial infarction) ([address]    History of acute congestive heart failure    AAA (abdominal aortic aneurysm) without rupture (HCC)    Chronic systolic CHF (congestive heart failure) ([address]    Coronary artery disease involving native coronary artery of native heart without angina pectoris    Hypotension due to drugs    Open toe wound    Pre-op testing          Plan:          Plan for wound - Dress per physician order  Treatment:     Compression : No   Offloading : Yes   Dressing : gauze   Additional Therapy :      1. Discussed appropriate home care of this wound. Wound redressed. 2. Patient instructions were given. 3. Follow up: 2 week(s).                            Electronically signed by Lurdes Agrawal DO on 4/22/2021 at 3:30 PM

## 2021-05-06 ENCOUNTER — HOSPITAL ENCOUNTER (OUTPATIENT)
Dept: WOUND CARE | Age: 71
Discharge: HOME | End: 2021-05-06
Payer: COMMERCIAL

## 2021-05-06 VITALS
RESPIRATION RATE: 18 BRPM | WEIGHT: 239 LBS | DIASTOLIC BLOOD PRESSURE: 72 MMHG | HEIGHT: 75 IN | TEMPERATURE: 97.1 F | HEART RATE: 67 BPM | BODY MASS INDEX: 29.72 KG/M2 | SYSTOLIC BLOOD PRESSURE: 110 MMHG

## 2021-05-06 DIAGNOSIS — L97.514: ICD-10-CM

## 2021-05-06 DIAGNOSIS — Z89.421 HISTORY OF AMPUTATION OF LESSER TOE OF RIGHT FOOT (HCC): ICD-10-CM

## 2021-05-06 PROCEDURE — 99024 POSTOP FOLLOW-UP VISIT: CPT | Performed by: NURSE PRACTITIONER

## 2021-05-06 PROCEDURE — 99212 OFFICE O/P EST SF 10 MIN: CPT

## 2021-05-06 ASSESSMENT — PAIN SCALES - GENERAL: PAINLEVEL_OUTOF10: 0

## 2021-05-06 NOTE — PROGRESS NOTES
[facility] [address]   Progress Note and Procedure Note      Veva Canavan  MEDICAL RECORD NUMBER:  [id number]  AGE: 66 y.o. GENDER: male  : 1950  EPISODE DATE:  2021    Subjective:     Chief Complaint   Patient presents with    Wound Check     follow up         HISTORY of PRESENT ILLNESS HPI     Veva Canavan is a 66 y.o. male who presents today for wound/ulcer evaluation. Mr. Eulalio Peters is here for suture removal of right foot 3rd toe amputation.   History of Wound Context: post surgical follow up right foot        PAST MEDICAL HISTORY        Diagnosis Date    AAA (abdominal aortic aneurysm) ([address]     CAD (coronary artery disease)     CARDIAC STENTS X 4    CHF (congestive heart failure) (HCC)     Gout     History of acute congestive heart failure     Hyperlipidemia     Hypertension     Neuropathy involving both lower extremities        PAST SURGICAL HISTORY    Past Surgical History:   Procedure Laterality Date    CARDIAC CATHETERIZATION  2019    HERNIA REPAIR      TOE AMPUTATION Right 4/15/2021    RIGHT THIRD TOE AMPUTATION performed by Curt [facility] DO [facility]       FAMILY HISTORY    Family History   Problem Relation Age of Onset    Heart Surgery Brother     Heart Disease Brother        SOCIAL HISTORY    Social History     Tobacco Use    Smoking status: Former Smoker     Packs/day: 1.00     Types: Cigarettes     Quit date: 1980     Years since quittin.9    Smokeless tobacco: Never Used    Tobacco comment: quit over 25 yrs ago   Substance Use Topics    Alcohol use: No    Drug use: No       ALLERGIES    Allergies   Allergen Reactions    Penicillins      Unknown what it does       MEDICATIONS    Current Outpatient Medications on File Prior to Encounter   Medication Sig Dispense Refill    furosemide (LASIX) 40 MG tablet TAKE 1 TABLET BY MOUTH DAILY TAKE ADDITIOINAL TAB FOR WEIGHT GAIN OF 3LBS OR MORE IN 24  tablet 1    pentoxifylline (TRENTAL) 400 MG extended release tablet TAKE 1 TABLET BY MOUTH 3 TIMES DAILY WITH MEALS (Patient taking differently: Take 400 mg by mouth 2 times daily (with meals) Do not crush or break.) 270 tablet 1    DULoxetine (CYMBALTA) 60 MG extended release capsule TAKE 1 CAPSULE BY MOUTH EVERY DAY 90 capsule 3    testosterone cypionate (DEPOTESTOTERONE CYPIONATE) 200 MG/ML injection INJECT 1 ML (200 MG) AS DIRECTED EVERY 14 DAYS 2 mL 5    ENTRESTO 24-26 MG per tablet TAKE 1 TABLET BY MOUTH TWICE A  tablet 3    atorvastatin (LIPITOR) 40 MG tablet TAKE 1 TABLET BY MOUTH EVERY DAY AT NIGHT 90 tablet 2    carvedilol (COREG) 3.125 MG tablet Take 1 tablet by mouth 2 times daily 180 tablet 3    isosorbide mononitrate (IMDUR) 30 MG extended release tablet Take 1 tablet by mouth daily 90 tablet 3    aspirin 81 MG chewable tablet TAKE 1 TABLET BY MOUTH EVERY DAY 90 tablet 3    clopidogrel (PLAVIX) 75 MG tablet TAKE 1 TABLET BY MOUTH EVERY DAY 90 tablet 3    Omega-3 Fatty Acids (FISH OIL PO) Take by mouth daily      Multiple Vitamins-Minerals (ONE DAILY MULTIVITAMIN MEN PO) Take by mouth daily      mupirocin (BACTROBAN NASAL) 2 % nasal ointment Apply with Qtip in nostrils twice daily for 5 days prior to surgery 1 Tube 3    nitroGLYCERIN (NITROSTAT) 0.4 MG SL tablet up to max of 3 total doses. If no relief after 1 dose, call 911. 25 tablet 1    B-D 3CC LUER-MAURY SYR 78HO9-3/2 22G X 1-1/2\" 3 ML MISC USE WITH TESTOSTERONE INJECTION 100 each 2    sildenafil (VIAGRA) 100 MG tablet TAKE 1 TABLET BY MOUTH AS NEEDED FOR ERECTILE DYSFUNCTION 6 tablet 5     No current facility-administered medications on file prior to encounter. REVIEW OF SYSTEMS    Pertinent items are noted in HPI.     Objective:      /72   Pulse 67   Temp 97.1 °F (36.2 °C) (Temporal)   Resp 18   Ht 6' 3\" (1.905 m)   Wt 239 lb (108.4 kg)   BMI 29.87 kg/m²     Wt Readings from Last 3 Encounters:   05/06/21 239 lb (108.4 kg)   04/22/21 239 lb (108.4 kg) 04/13/21 239 lb (108.4 kg)       PHYSICAL EXAM    General Appearance: alert and oriented to person, place and time, well developed and well- nourished, in no acute distress  Skin: warm and dry, no rash or erythema  Head: normocephalic and atraumatic  Eyes: pupils equal, round, and reactive to light, extraocular eye movements intact, conjunctivae normal  ENT: tympanic membrane, external ear and ear canal normal bilaterally, nose without deformity, nasal mucosa and turbinates normal without polyps  Neck: supple and non-tender without mass, no thyromegaly or thyroid nodules, no cervical lymphadenopathy  Pulmonary/Chest: clear to auscultation bilaterally- no wheezes, rales or rhonchi, normal air movement, no respiratory distress  Extremities: no cyanosis, clubbing or edema  Musculoskeletal: normal range of motion, no joint swelling, deformity or tenderness  Neurologic: reflexes normal and symmetric, no cranial nerve deficit, gait, coordination and speech normal      Assessment:      Patient Active Problem List   Diagnosis Code    Gout M10.9    Anxiety F41.9    Mixed hyperlipidemia E78.2    Neuropathy G62.9    Benign nodular prostatic hyperplasia without lower urinary tract symptoms N40.0    Peripheral vascular disease ([facility] I73.9    Tinea pedis of left foot B35.3    Idiopathic peripheral neuropathy G60.9    Hearing aid worn Z97.4    Acute pain of right shoulder M25.511    NSTEMI (non-ST elevated myocardial infarction) ([facility]) I21.4    History of acute congestive heart failure Z86.79    AAA (abdominal aortic aneurysm) without rupture ([facility]) I71.4    Chronic systolic CHF (congestive heart failure) ([facility]) I50.22    Coronary artery disease involving native coronary artery of native heart without angina pectoris I25.10    Hypotension due to drugs I95.2    Open toe wound S91.109A    History of amputation of lesser toe of right foot ([facility] Z89.421            Incision 04/15/21 Toe (Comment  which one) Anterior;Right (Active)   Wound Image   05/06/21 1346   Dressing Status Clean;Dry; Intact 05/06/21 1346   Incision Cleansed Cleansed with saline 05/06/21 1346   Dressing/Treatment Ace wrap 04/15/21 0856   Incision Length (cm) 0 05/06/21 1346   Incision Width (cm) 0 cm 05/06/21 1346   Incision Depth (cm) 0 cm 05/06/21 1346   Closure Sutures 05/06/21 1346   Margins Approximated 04/22/21 1454   Incision Assessment Dry 05/06/21 1346   Drainage Amount None 05/06/21 1346   Odor None 05/06/21 1346   Vicenta-incision Assessment Intact 05/06/21 1346   Number of days: 21       Wound 05/19/19 Toe (Comment  which one) Anterior;Right (Active)   Number of days: 718       Wound 03/30/21 Toe (Comment  which one) Right;Posterior wound 1- right 3rd toe neuropathic (Active)   Wound Image   04/08/21 1523   Wound Etiology Other 05/06/21 1346   Dressing Status New drainage noted 05/06/21 1346   Wound Cleansed Cleansed with saline 05/06/21 1346   Dressing/Treatment Gauze dressing/dressing sponge;Non adherent; Roll gauze;Tape/Soft cloth adhesive tape 04/22/21 1530   Offloading for Diabetic Foot Ulcers Forefoot offloading shoe 05/06/21 1346   Wound Length (cm) 0 cm 05/06/21 1346   Wound Width (cm) 0 cm 05/06/21 1346   Wound Depth (cm) 0 cm 05/06/21 1346   Wound Surface Area (cm^2) 0 cm^2 05/06/21 1346   Change in Wound Size % (l*w) 100 05/06/21 1346   Wound Volume (cm^3) 0 cm^3 05/06/21 1346   Wound Healing % 100 05/06/21 1346   Post-Procedure Length (cm) 0 cm 05/06/21 1346   Post-Procedure Width (cm) 0 cm 05/06/21 1346   Post-Procedure Depth (cm) 0 cm 05/06/21 1346   Post-Procedure Surface Area (cm^2) 0 cm^2 05/06/21 1346   Post-Procedure Volume (cm^3) 0 cm^3 05/06/21 1346   Distance Tunneling (cm) 0 cm 05/06/21 1346   Tunneling Position ___ O'Clock 0 05/06/21 1346   Undermining Starts ___ O'Clock 0 05/06/21 1346   Undermining Ends___ O'Clock 0 05/06/21 1346   Undermining Maxium Distance (cm) 0 05/06/21 1346   Wound Assessment Exposed structure drainage. If you need medical attention outside of the business hours of the [address] [place] [address] Road please contact your PCP or go to the nearest emergency room.           Electronically signed by CHRIS Clarke CNP on 5/6/2021 at 2:12 PM

## 2021-05-20 ENCOUNTER — HOSPITAL ENCOUNTER (OUTPATIENT)
Dept: WOUND CARE | Age: 71
Discharge: HOME | End: 2021-05-20
Payer: COMMERCIAL

## 2021-05-20 VITALS
BODY MASS INDEX: 29.72 KG/M2 | SYSTOLIC BLOOD PRESSURE: 113 MMHG | RESPIRATION RATE: 18 BRPM | WEIGHT: 239 LBS | HEART RATE: 74 BPM | HEIGHT: 75 IN | TEMPERATURE: 98.2 F | DIASTOLIC BLOOD PRESSURE: 70 MMHG

## 2021-05-20 DIAGNOSIS — Z89.421 HISTORY OF AMPUTATION OF LESSER TOE OF RIGHT FOOT (HCC): ICD-10-CM

## 2021-05-20 PROCEDURE — 99212 OFFICE O/P EST SF 10 MIN: CPT

## 2021-05-20 PROCEDURE — 99212 OFFICE O/P EST SF 10 MIN: CPT | Performed by: NURSE PRACTITIONER

## 2021-05-20 ASSESSMENT — PAIN SCALES - GENERAL: PAINLEVEL_OUTOF10: 0

## 2021-05-20 NOTE — PROGRESS NOTES
[address]   Progress Note and Procedure Note      Zheng Brady  MEDICAL RECORD NUMBER:  [id number]  AGE: 66 y.o. GENDER: male  : 1950  EPISODE DATE:  2021    Subjective:     Chief Complaint   Patient presents with    Wound Check     Right 3rd toe amp site; Patient denies increased pain at wound site      4/15/21- Dr. Jane Victoria  Right 3rd toe amputation     HISTORY of PRESENT ILLNESS HPI     Zheng Brady is a 66 y.o. male who presents today for wound/ulcer evaluation.    History of Wound Context: right foot wound post surgical    Ulcer Identification:  Ulcer Type: surgical incision  Contributing Factors: none    Wound: Surgical incision        PAST MEDICAL HISTORY        Diagnosis Date    AAA (abdominal aortic aneurysm) (HCC)     CAD (coronary artery disease)     CARDIAC STENTS X 4    CHF (congestive heart failure) (HCC)     Gout     History of acute congestive heart failure     Hyperlipidemia     Hypertension     Neuropathy involving both lower extremities        PAST SURGICAL HISTORY    Past Surgical History:   Procedure Laterality Date    CARDIAC CATHETERIZATION  2019    HERNIA REPAIR      TOE AMPUTATION Right 4/15/2021    RIGHT THIRD TOE AMPUTATION performed by Smith Stanley DO at [facility] OR       FAMILY HISTORY    Family History   Problem Relation Age of Onset    Heart Surgery Brother     Heart Disease Brother        SOCIAL HISTORY    Social History     Tobacco Use    Smoking status: Former Smoker     Packs/day: 1.00     Types: Cigarettes     Quit date: 1980     Years since quittin.0    Smokeless tobacco: Never Used    Tobacco comment: quit over 25 yrs ago   Vaping Use    Vaping Use: Never used   Substance Use Topics    Alcohol use: No    Drug use: No       ALLERGIES    Allergies   Allergen Reactions    Penicillins      Unknown what it does       MEDICATIONS    Current Outpatient Medications on File Prior to Encounter   Medication Sig °C) (Temporal)   Resp 18   Ht 6' 3\" (1.905 m)   Wt 239 lb (108.4 kg)   BMI 29.87 kg/m²     Wt Readings from Last 3 Encounters:   05/20/21 239 lb (108.4 kg)   05/06/21 239 lb (108.4 kg)   04/22/21 239 lb (108.4 kg)       PHYSICAL EXAM    General Appearance: alert and oriented to person, place and time, well developed and well- nourished, in no acute distress  Skin: warm and dry, no rash or erythema  Head: normocephalic and atraumatic  Eyes: pupils equal, round, and reactive to light, extraocular eye movements intact, conjunctivae normal  ENT: tympanic membrane, external ear and ear canal normal bilaterally, nose without deformity, nasal mucosa and turbinates normal without polyps  Neck: supple and non-tender without mass, no thyromegaly or thyroid nodules, no cervical lymphadenopathy  Pulmonary/Chest: clear to auscultation bilaterally- no wheezes, rales or rhonchi, normal air movement, no respiratory distress  Extremities: no cyanosis, clubbing or edema  Musculoskeletal: normal range of motion, no joint swelling, deformity or tenderness  Neurologic: reflexes normal and symmetric, no cranial nerve deficit, gait, coordination and speech normal      Assessment:      Patient Active Problem List   Diagnosis Code    Gout M10.9    Anxiety F41.9    Mixed hyperlipidemia E78.2    Neuropathy G62.9    Benign nodular prostatic hyperplasia without lower urinary tract symptoms N40.0    Peripheral vascular disease ([facility]) I73.9    Tinea pedis of left foot B35.3    Idiopathic peripheral neuropathy G60.9    Hearing aid worn Z97.4    Acute pain of right shoulder M25.511    NSTEMI (non-ST elevated myocardial infarction) ([facility]) I21.4    History of acute congestive heart failure Z86.79    AAA (abdominal aortic aneurysm) without rupture ([facility]) I71.4    Chronic systolic CHF (congestive heart failure) ([facility]) I50.22    Coronary artery disease involving native coronary artery of native heart without angina pectoris I25.10    Hypotension due to drugs I95.2    Open toe wound S91.109A    History of amputation of lesser toe of right foot ([facility] Z89.421            Incision 04/15/21 Toe (Comment  which one) Anterior;Right (Active)   Wound Image   05/06/21 1346   Dressing Status Clean;Dry; Intact 05/06/21 1346   Incision Cleansed Cleansed with saline 05/06/21 1346   Incision Length (cm) 0 05/06/21 1346   Incision Width (cm) 0 cm 05/06/21 1346   Incision Depth (cm) 0 cm 05/06/21 1346   Closure Sutures 05/06/21 1346   Margins Approximated 04/22/21 1454   Incision Assessment Dry 05/06/21 1346   Drainage Amount None 05/06/21 1346   Odor None 05/06/21 1346   Vicenta-incision Assessment Intact 05/06/21 1346   Number of days: 35       Wound 05/19/19 Toe (Comment  which one) Anterior;Right (Active)   Number of days: 732       Wound 03/30/21 Toe (Comment  which one) Right;Posterior wound 1- right 3rd toe neuropathic (Active)   Wound Image   05/20/21 1342   Wound Etiology Other 05/20/21 1342   Dressing Status New drainage noted 05/20/21 1342   Wound Cleansed Soap and water 05/20/21 1342   Dressing/Treatment Gauze dressing/dressing sponge;Non adherent; Roll gauze;Tape/Soft cloth adhesive tape 04/22/21 1530   Offloading for Diabetic Foot Ulcers Forefoot offloading shoe 05/20/21 1342   Wound Length (cm) 0 cm 05/20/21 1342   Wound Width (cm) 0 cm 05/20/21 1342   Wound Depth (cm) 0 cm 05/20/21 1342   Wound Surface Area (cm^2) 0 cm^2 05/20/21 1342   Change in Wound Size % (l*w) 100 05/20/21 1342   Wound Volume (cm^3) 0 cm^3 05/20/21 1342   Wound Healing % 100 05/20/21 1342   Post-Procedure Length (cm) 0 cm 05/20/21 1342   Post-Procedure Width (cm) 0 cm 05/20/21 1342   Post-Procedure Depth (cm) 0 cm 05/20/21 1342   Post-Procedure Surface Area (cm^2) 0 cm^2 05/20/21 1342   Post-Procedure Volume (cm^3) 0 cm^3 05/20/21 1342   Distance Tunneling (cm) 0 cm 05/06/21 1346   Tunneling Position ___ O'Clock 0 05/06/21 1346   Undermining Starts ___ O'Clock 0 05/06/21 1346 Undermining Ends___ O'Clock 0 05/06/21 1346   Undermining Maxium Distance (cm) 0 05/06/21 1346   Wound Assessment Epithelialization 05/20/21 1342   Drainage Amount None 05/20/21 1342   Drainage Description Serosanguinous 03/30/21 1022   Odor None 05/20/21 1342   Vicenta-wound Assessment Intact 05/20/21 1342   Margins Defined edges 04/08/21 1523   Wound Thickness Description not for Pressure Injury Full thickness 04/08/21 1523   Number of days: 51       Plan:     Problem List Items Addressed This Visit     * (Principal) History of amputation of lesser toe of right foot ([address]              Treatment Note please see attached Discharge Instructions    In my professional opinion this patient would benefit from HBO Therapy: No    Written patient dismissal instructions given to patient and signed by patient or POA. Mr. Juan Alberto Avery is healed! Discharge [address] and Hyperbaric Oxygen Therapy   Physician Orders and Discharge Instructions  [address] [facility] [place]  Amalia Grossman  Telephone: [phone]    NAME:  Stephen Arce OF BIRTH:  1950  MEDICAL RECORD NUMBER:  [id number]  DATE:  5/20/2021    Discharge condition: Stable    Discharge to: Home    Left via:Private automobile    Accompanied by:  spouse    ECF/HHA: none     Dressing Orders: Toe amp:   Begin wearing regular shoe. Diabetes can lead to many different types of foot complications, including athlete's foot (a fungal infection), calluses, bunions and other foot deformities, or ulcers that can range from a surface wound to a deep infection. You will need to check your feet twice daily and give them special care and attention. 1) Wash with lukewarm water and a mild soap. Dry well between your toes. Do not soak your feet unless instructed to do so by a physician.         Moisturize your feet with a good thick cream like Eucerin Cream, Aquaphor or Cocoa Butter (in a jar) at least twice daily. Do not apply moisturizer between toes. 2)  Protect your feet and never go barefoot. Wear slippers around the house. Treat  even minor wounds and skin cracks as an urgent matter when you have diabetes. Remember early treatment is essential to avoid more advanced problems. 3) Check your feet daily or have someone else check them if your eyesight is limited. If you live alone try using a handheld mirror. Watch for a red spot that persists or callus formation. 4)  Look and feel inside your shoes before putting them on. Inspect the soles for nails or screws. 5)  If you form callus or thickened skin on your feet use Flexitol Heel Balm once daily alternating with your regular moisturizer. Hilda Held is available at [employer] ($12) and other pharmacies. 6) If you form callus this is  a sign that this area is getting too much pressure or rubbing in your shoe. You may use a pumice stone gently to any callus after a shower 3 times weekly. Be very careful as you will not feel it if you rub too hard. 7) Try wearing two pairs of white cotton socks. Wear a thin pair inside out so the seam is not on your toes. Wear the other pair over the thin pair. This will sometimes stop the friction and rubbing. 8)  If you get a callus you will need your inserts or your shoe adjusted by the orthotist where your shoes were made. Take them back immediately, do not wait. It is best to get your shoes from a company that specializes in [address] [place] [address]. These companies have an Orthotist on staff who can work on your shoes when needed. Just because they are Diabetic shoes does not mean they will be perfect when you get them. They may need to be stretched or need the insoles reworked and you need a company that offers this (on site) after the sale of your shoe. 9) Callus is never a good thing on a diabetic or neuropathic foot. This is where wounds come from.  The callus can dry and crack letting bacteria enter your foot leading to an infection of the area and ulceration. 10) Diabetics should have new shoes yearly. (This prescription can be obtained from your primary care physician who does your foot exams)  You will also get 3 pairs of insoles with your shoes. As your feet change through the course of the year you may need to get your insoles or even your shoe adjusted by the orthotist.  Please take advantage of their expertise and keep your footwear in good shape. Break in new shoes slowly, just a few hours a day. Watch for red spots, areas that rub or callus. 11) As a diabetic you also need to see a Podiatrist every two months for a toenail trim. Your insurance should pay for this. Avoid trimming your toenails yourself. If you must, they should be trimmed straight across. 12)  Keeping your blood sugar in control will help prevent future ulcerations. If you smoke, STOP. Diabetes and smoking are a bad combination that increases your risk of complications from Diabetes. 13)  Avoid activities that can injure the feet -- Some activities increase the risk of foot injury and are not recommended, including walking barefoot, using a heating pad or hot water bottle on the feet, and stepping into the bathtub before testing the temperature. 14)  Ask for foot exams -- Screening for foot complications should be a routine part of most medical visits but is sometimes overlooked. Don't hesitate to ask the health care provider for a foot check at least once a year and more frequently if there are foot changes. Treatment Orders: Wear off-load shoe at all times      [address] [facility] follow up visit _______Call if needed______________________  (Please note your next appointment above and if you are unable to keep, kindly give a 24 hour notice.  Thank you.)          If you experience any of the following, please call the [facility] [address] during business hours:    * Increase in Pain  * Temperature over 101  * Increase in drainage from your wound  * Drainage with a foul odor  * Bleeding  * Increase in swelling  * Need for compression bandage changes due to slippage, breakthrough drainage. If you need medical attention outside of the business hours of the [address] [place] [address] Road please contact your PCP or go to the nearest emergency room.           Electronically signed by CHRIS Sandhu CNP on 5/20/2021 at 3:45 PM

## 2021-05-27 RX ORDER — NITROGLYCERIN 0.4 MG/1
TABLET SUBLINGUAL
Qty: 25 TABLET | Refills: 1 | Status: SHIPPED | OUTPATIENT
Start: 2021-05-27 | End: 2021-05-28 | Stop reason: SDUPTHER

## 2021-05-28 RX ORDER — NITROGLYCERIN 0.4 MG/1
TABLET SUBLINGUAL
Qty: 25 TABLET | Refills: 1 | Status: SHIPPED | OUTPATIENT
Start: 2021-05-28 | End: 2021-09-21 | Stop reason: SDUPTHER

## 2021-06-01 DIAGNOSIS — I73.9 PERIPHERAL VASCULAR DISEASE (HCC): ICD-10-CM

## 2021-06-01 DIAGNOSIS — Z86.79 HISTORY OF ACUTE CONGESTIVE HEART FAILURE: ICD-10-CM

## 2021-06-01 DIAGNOSIS — F43.10 PTSD (POST-TRAUMATIC STRESS DISORDER): ICD-10-CM

## 2021-06-01 RX ORDER — ISOSORBIDE MONONITRATE 30 MG/1
30 TABLET, EXTENDED RELEASE ORAL DAILY
Qty: 90 TABLET | Refills: 1 | Status: CANCELLED | OUTPATIENT
Start: 2021-06-01

## 2021-06-01 RX ORDER — FUROSEMIDE 40 MG/1
40 TABLET ORAL DAILY
Qty: 180 TABLET | Refills: 1 | Status: CANCELLED | OUTPATIENT
Start: 2021-06-01

## 2021-06-01 RX ORDER — CLOPIDOGREL BISULFATE 75 MG/1
TABLET ORAL
Qty: 90 TABLET | Refills: 1 | Status: CANCELLED | OUTPATIENT
Start: 2021-06-01

## 2021-06-01 RX ORDER — ATORVASTATIN CALCIUM 40 MG/1
TABLET, FILM COATED ORAL
Qty: 90 TABLET | Refills: 1 | Status: CANCELLED | OUTPATIENT
Start: 2021-06-01

## 2021-06-01 RX ORDER — CARVEDILOL 3.12 MG/1
3.12 TABLET ORAL 2 TIMES DAILY
Qty: 180 TABLET | Refills: 1 | Status: CANCELLED | OUTPATIENT
Start: 2021-06-01

## 2021-06-01 RX ORDER — PENTOXIFYLLINE 400 MG/1
400 TABLET, EXTENDED RELEASE ORAL 2 TIMES DAILY WITH MEALS
Qty: 180 TABLET | Refills: 1 | Status: CANCELLED | OUTPATIENT
Start: 2021-06-01

## 2021-06-01 RX ORDER — DULOXETIN HYDROCHLORIDE 60 MG/1
CAPSULE, DELAYED RELEASE ORAL
Qty: 90 CAPSULE | Refills: 1 | Status: CANCELLED | OUTPATIENT
Start: 2021-06-01

## 2021-06-01 RX ORDER — SACUBITRIL AND VALSARTAN 24; 26 MG/1; MG/1
TABLET, FILM COATED ORAL
Qty: 180 TABLET | Refills: 1 | Status: CANCELLED | OUTPATIENT
Start: 2021-06-01

## 2021-06-04 NOTE — TELEPHONE ENCOUNTER
Received request for med refills from [facility] pill pack. Called pt and left message to confirm if wanted refills sent into [facility].

## 2021-06-22 RX ORDER — SILDENAFIL 100 MG/1
TABLET, FILM COATED ORAL
Qty: 30 TABLET | Refills: 5 | Status: SHIPPED | OUTPATIENT
Start: 2021-06-22 | End: 2021-07-01 | Stop reason: SDUPTHER

## 2021-06-25 RX ORDER — CLOPIDOGREL BISULFATE 75 MG/1
TABLET ORAL
Qty: 90 TABLET | Refills: 0 | Status: SHIPPED | OUTPATIENT
Start: 2021-06-25 | End: 2021-09-29

## 2021-06-30 DIAGNOSIS — I73.9 PERIPHERAL VASCULAR DISEASE (HCC): ICD-10-CM

## 2021-06-30 DIAGNOSIS — Z86.79 HISTORY OF ACUTE CONGESTIVE HEART FAILURE: ICD-10-CM

## 2021-06-30 RX ORDER — FUROSEMIDE 40 MG/1
40 TABLET ORAL DAILY
Qty: 180 TABLET | Refills: 3 | Status: SHIPPED | OUTPATIENT
Start: 2021-06-30 | End: 2021-09-23

## 2021-06-30 RX ORDER — PENTOXIFYLLINE 400 MG/1
400 TABLET, EXTENDED RELEASE ORAL 2 TIMES DAILY WITH MEALS
Qty: 180 TABLET | Refills: 3 | Status: SHIPPED | OUTPATIENT
Start: 2021-06-30 | End: 2021-07-21 | Stop reason: SDUPTHER

## 2021-07-01 ENCOUNTER — TELEPHONE (OUTPATIENT)
Dept: FAMILY MEDICINE CLINIC | Age: 71
End: 2021-07-01

## 2021-07-01 RX ORDER — SILDENAFIL 100 MG/1
TABLET, FILM COATED ORAL
Qty: 30 TABLET | Refills: 5 | Status: SHIPPED | OUTPATIENT
Start: 2021-07-01 | End: 2021-07-28 | Stop reason: SDUPTHER

## 2021-07-21 RX ORDER — PENTOXIFYLLINE 400 MG/1
400 TABLET, EXTENDED RELEASE ORAL 2 TIMES DAILY WITH MEALS
Qty: 180 TABLET | Refills: 3 | Status: SHIPPED | OUTPATIENT
Start: 2021-07-21 | End: 2021-11-15

## 2021-07-29 RX ORDER — SILDENAFIL 100 MG/1
TABLET, FILM COATED ORAL
Qty: 30 TABLET | Refills: 5 | Status: SHIPPED | OUTPATIENT
Start: 2021-07-29 | End: 2022-05-19 | Stop reason: SDUPTHER

## 2021-08-04 ENCOUNTER — TELEPHONE (OUTPATIENT)
Dept: CARDIOLOGY CLINIC | Age: 71
End: 2021-08-04

## 2021-08-30 RX ORDER — ISOSORBIDE MONONITRATE 30 MG/1
30 TABLET, EXTENDED RELEASE ORAL DAILY
Qty: 90 TABLET | Refills: 0 | Status: SHIPPED | OUTPATIENT
Start: 2021-08-30 | End: 2021-09-29

## 2021-09-09 RX ORDER — ATORVASTATIN CALCIUM 40 MG/1
TABLET, FILM COATED ORAL
Qty: 90 TABLET | Refills: 2 | Status: SHIPPED | OUTPATIENT
Start: 2021-09-09 | End: 2021-11-15

## 2021-09-14 ENCOUNTER — TELEPHONE (OUTPATIENT)
Dept: VASCULAR SURGERY | Age: 71
End: 2021-09-14

## 2021-09-14 DIAGNOSIS — E78.2 MIXED HYPERLIPIDEMIA: Primary | ICD-10-CM

## 2021-09-14 NOTE — TELEPHONE ENCOUNTER
Called and talked to patient and scheduled [facility] for 10/13/21 with an arrival time of 9:30. Follow up visit with Jenny Villarreal is 10/13/21 at 10:30.  Patient agreed to appointment times

## 2021-09-14 NOTE — TELEPHONE ENCOUNTER
Cleon called to schedule a 1 year follow up . The pt wanted to know if he needed to have any test done prior the to the visit. Please be advised that the best time to call him to accommodate their needs is Anytime. Thank you.

## 2021-09-21 RX ORDER — NITROGLYCERIN 0.4 MG/1
TABLET SUBLINGUAL
Qty: 25 TABLET | Refills: 1 | Status: SHIPPED | OUTPATIENT
Start: 2021-09-21 | End: 2021-09-22 | Stop reason: SDUPTHER

## 2021-09-22 ENCOUNTER — OFFICE VISIT (OUTPATIENT)
Dept: FAMILY MEDICINE CLINIC | Age: 71
End: 2021-09-22
Payer: COMMERCIAL

## 2021-09-22 VITALS
WEIGHT: 249.2 LBS | DIASTOLIC BLOOD PRESSURE: 78 MMHG | HEART RATE: 65 BPM | OXYGEN SATURATION: 100 % | HEIGHT: 75 IN | BODY MASS INDEX: 30.99 KG/M2 | TEMPERATURE: 97.7 F | SYSTOLIC BLOOD PRESSURE: 112 MMHG

## 2021-09-22 DIAGNOSIS — E55.9 VITAMIN D DEFICIENCY: ICD-10-CM

## 2021-09-22 DIAGNOSIS — E78.2 MIXED HYPERLIPIDEMIA: ICD-10-CM

## 2021-09-22 DIAGNOSIS — I25.10 CORONARY ARTERY DISEASE INVOLVING NATIVE CORONARY ARTERY OF NATIVE HEART WITHOUT ANGINA PECTORIS: ICD-10-CM

## 2021-09-22 DIAGNOSIS — Z95.5 HX OF HEART ARTERY STENT: ICD-10-CM

## 2021-09-22 DIAGNOSIS — E34.9 TESTOSTERONE DEFICIENCY: ICD-10-CM

## 2021-09-22 DIAGNOSIS — N40.0 BENIGN NODULAR PROSTATIC HYPERPLASIA WITHOUT LOWER URINARY TRACT SYMPTOMS: ICD-10-CM

## 2021-09-22 DIAGNOSIS — I95.2 HYPOTENSION DUE TO DRUGS: ICD-10-CM

## 2021-09-22 DIAGNOSIS — R23.3 EASY BRUISING: ICD-10-CM

## 2021-09-22 DIAGNOSIS — Z01.89 PATIENT REQUEST FOR DIAGNOSTIC TESTING: ICD-10-CM

## 2021-09-22 PROCEDURE — 99214 OFFICE O/P EST MOD 30 MIN: CPT | Performed by: FAMILY MEDICINE

## 2021-09-22 RX ORDER — NITROGLYCERIN 0.4 MG/1
TABLET SUBLINGUAL
Qty: 25 TABLET | Refills: 1 | Status: SHIPPED | OUTPATIENT
Start: 2021-09-22

## 2021-09-22 ASSESSMENT — ENCOUNTER SYMPTOMS
SHORTNESS OF BREATH: 0
VOMITING: 0
DIARRHEA: 0
WHEEZING: 0
EYES NEGATIVE: 1
CHEST TIGHTNESS: 0
COUGH: 0
NAUSEA: 0
CONSTIPATION: 0
BLOOD IN STOOL: 0

## 2021-09-22 NOTE — PROGRESS NOTES
Subjective:      Patient ID: Sandra Lamar is a 66 y.o. male. HPI this patient is seen here intermittently by the undersigned for management of chronic disease states. He is here today to discuss various medications. He was having some chest pain back in 2019. He did evolve to having coronary artery disease diagnosed. At that time he was put on Plavix and told that he would need to remain on this for a year. He is on it now for nearly 2 years. He did complain of easy bruising and I have reviewed the diagram provided by Dr. Estephania Cotter when he did his coronary work-up and it does indeed show that he recommended Plavix for 1 year only. I therefore have asked Renzo to go ahead and stop the Plavix. He will remain on aspirin daily and I did suggest to him that an 81 mg daily would suffice. He did voice understanding. He was seen in consultation in 2019 by Dr. Danny Apple did undergo coronary angiogram. He was found to have occlusive disease. It is my understanding that he did have 4 stents placed, one in the left circumflex, a stent in the distal LAD, the mid LAD, and another in the LAD as well and then did have another medication added to his regimen. This included Imdur 30 mg by mouth daily, he was placed on Coreg 6.25 one by mouth twice twice a day, and Entresto 49/51  at  a frequency of twice a day was added. As noted above, I have now cut back on both of these medications, and that I have cut Coreg to 3.125 p.o. twice daily and I have decreased Entresto to using one half of the size of the pill that he is on 3 times daily or 75% of the amount that he previously was taking. Ventolin HFA HandiHaler was recommended at 2 puffs every 6 hours.  As of this time, he remains on Imdur 30 mg extended release at 1 daily. The Plavix which she has been taking until today I am stopping. He continues on Lasix 40 mg and uses a daily when he has a weight gain of 3 pounds or more in a 24-hour period.   Entresto 24/26 is continued at 1 tablet twice daily. Coreg is continued at 3.125 mg p.o. twice daily. He remains on fish oil daily. These will be continued with the exception of the Plavix. The patient does have nitroglycerin available and we will refill that for him today at [facility] pharmacy in [place] by his request.  Blood pressure today was graded 112/78. 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 He was working with [employer] at that time. Now he does some as needed work for [employer].  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 [facility] [address]  For his history of hyperlipidemia, he is on Lipitor 40 mg by mouth daily at bedtime and as noted above he is on an 81 mg aspirin daily as well. He is on testosterone cypionate at 1 cc or 200 mg IM every 14 days. Does utilize Cymbalta 60 mg p.o. extended release at 1 daily for depression. Patient only requested refill on the as needed nitroglycerin 0.4 which is being sent as noted above to [facility] pharmacy in [place].          Review of Systems   Constitutional: Negative. HENT: Negative. Eyes: Negative. Respiratory: Negative for cough, chest tightness, shortness of breath and wheezing. Cardiovascular: Negative for chest pain, palpitations and leg swelling. Gastrointestinal: Negative for blood in stool, constipation, diarrhea, nausea and vomiting. Genitourinary: Negative for hematuria. Musculoskeletal: Positive for arthralgias. Skin: Negative. Neurological: Negative. Psychiatric/Behavioral: Negative. Objective:   Physical Exam  Vitals and nursing note reviewed. Constitutional:       General: He is not in acute distress. Appearance: Normal appearance. He is well-developed. He is obese. He is not ill-appearing, toxic-appearing or diaphoretic. Comments: Weight remains somewhat in excess of ideal at 249 pounds.    HENT:      Head: Normocephalic and atraumatic. Right Ear: Tympanic membrane, ear canal and external ear normal. There is no impacted cerumen. Left Ear: Tympanic membrane, ear canal and external ear normal. There is no impacted cerumen. Nose: Nose normal.      Mouth/Throat:      Lips: Pink. Mouth: Mucous membranes are moist.      Dentition: Normal dentition. Tongue: No lesions. Pharynx: Oropharynx is clear. Uvula midline. Tonsils: No tonsillar exudate or tonsillar abscesses. Eyes:      General: Lids are normal. No scleral icterus. Right eye: No discharge. Left eye: No discharge. Extraocular Movements:      Right eye: Normal extraocular motion. Left eye: Normal extraocular motion. Conjunctiva/sclera: Conjunctivae normal.      Right eye: Right conjunctiva is not injected. Left eye: Left conjunctiva is not injected. Pupils: Pupils are equal, round, and reactive to light. Neck:      Thyroid: No thyromegaly. Vascular: No carotid bruit or JVD. Cardiovascular:      Rate and Rhythm: Normal rate and regular rhythm. Pulses:           Carotid pulses are 2+ on the right side and 2+ on the left side. Radial pulses are 2+ on the right side and 2+ on the left side. Heart sounds: Normal heart sounds, S1 normal and S2 normal. No murmur heard. No friction rub. No gallop. Pulmonary:      Effort: Pulmonary effort is normal. No accessory muscle usage or respiratory distress. Breath sounds: Normal breath sounds. No stridor. No wheezing, rhonchi or rales. Chest:      Chest wall: No tenderness. Abdominal:      General: Bowel sounds are normal. There is no distension or abdominal bruit. Palpations: Abdomen is soft. There is no mass. Tenderness: There is no abdominal tenderness. There is no right CVA tenderness, left CVA tenderness, guarding or rebound. Hernia: No hernia is present. Musculoskeletal:         General: Normal range of motion. Cervical back: Normal range of motion and neck supple. No rigidity or tenderness. Right lower leg: No edema. Left lower leg: No edema. Lymphadenopathy:      Cervical: No cervical adenopathy. Right cervical: No superficial cervical adenopathy. Left cervical: No superficial cervical adenopathy. Skin:     General: Skin is warm and dry. Coloration: Skin is not jaundiced or pale. Findings: Bruising present. No erythema, lesion or rash. Nails: There is no clubbing. Comments: The patient does have complain today of easy bruising. He states he has been on Plavix now 2 years ever since he had stent surgery for his heart in 2019. He did show me the diagram where he had 3 blockages in the  maker and another blockage as well. He did receive 4 stents at that time. It says of the bottom on that she had a paper that he was to remain on Plavix for 1 year. He has now been on it 2 years and complains of the easy bruising. I therefore I am going to have him stop the Plavix. He will continue the aspirin and I did recommend a baby aspirin 81 mg p.o. daily. He did voice understanding   Neurological:      General: No focal deficit present. Mental Status: He is alert and oriented to person, place, and time. Mental status is at baseline. Cranial Nerves: No facial asymmetry. Motor: No weakness or tremor. Coordination: Coordination normal.      Gait: Gait normal.      Deep Tendon Reflexes: Reflexes are normal and symmetric. Psychiatric:         Attention and Perception: Attention normal.         Mood and Affect: Mood normal.         Speech: Speech normal.         Behavior: Behavior normal.         Thought Content:  Thought content normal.         Cognition and Memory: Memory normal.         Judgment: Judgment normal.         /78 (Site: Right Upper Arm, Position: Sitting, Cuff Size: Large Adult)   Pulse 65   Temp 97.7 °F (36.5 °C) (Temporal)   Ht 6' 3\" (1.905 m)   Wt 249 lb 3.2 oz (113 kg)   SpO2 100%   BMI 31.15 kg/m²   Assessment:         Diagnosis Orders   1. Coronary artery disease involving native coronary artery of native heart without angina pectoris     2. Hx of heart artery stent      Stent placement left coronary artery LAD x3 and circumflex [id number] by Dr. Yassine Allen. 3. Testosterone deficiency  Testosterone Free and Total Male   4. Benign nodular prostatic hyperplasia without lower urinary tract symptoms  PSA, Total and Free   5. Mixed hyperlipidemia  Lipid Panel    T4, Free    TSH without Reflex    Hepatic Function Panel   6. Hypotension due to drugs  CBC    Basic Metabolic Panel    Urinalysis   7. Vitamin D deficiency  Vitamin D 25 Hydroxy   8. Patient request for diagnostic testing  Type and Screen   9. Easy bruising             Plan:       I am having Mr. Anamaria Hudson maintain his :   Outpatient Medications Marked as Taking for the 9/22/21 encounter (Office Visit) with Shanthi Chakraborty MD   Medication Sig Dispense Refill    nitroGLYCERIN (NITROSTAT) 0.4 MG SL tablet up to max of 3 total doses. If no relief after 1 dose, call 911. 25 tablet 1    atorvastatin (LIPITOR) 40 MG tablet Take 1 tablet by mouth daily at night. 90 tablet 2    isosorbide mononitrate (IMDUR) 30 MG extended release tablet Take 1 tablet by mouth daily.  90 tablet 0    sildenafil (VIAGRA) 100 MG tablet TAKE 1 TABLET BY MOUTH AS NEEDED FOR ERECTILE DYSFUNCTION 30 tablet 5    furosemide (LASIX) 40 MG tablet Take 1 tablet by mouth daily Take additioinal tab for weight gain of 3lbs or more in 24 hrs 180 tablet 3    clopidogrel (PLAVIX) 75 MG tablet TAKE 1 TABLET BY MOUTH EVERY DAY 90 tablet 0    B-D 3CC LUER-MAURY SYR 55SI2-5/2 22G X 1-1/2\" 3 ML MISC USE WITH TESTOSTERONE INJECTION 100 each 2    testosterone cypionate (DEPOTESTOTERONE CYPIONATE) 200 MG/ML injection INJECT 1 ML (200 MG) AS DIRECTED EVERY 14 DAYS 2 mL 5    ENTRESTO 24-26 MG per tablet TAKE 1 TABLET BY MOUTH TWICE A  tablet 3    carvedilol (COREG) 3.125 MG tablet Take 1 tablet by mouth 2 times daily 180 tablet 3    aspirin 81 MG chewable tablet TAKE 1 TABLET BY MOUTH EVERY DAY 90 tablet 3    Omega-3 Fatty Acids (FISH OIL PO) Take by mouth daily      Multiple Vitamins-Minerals (ONE DAILY MULTIVITAMIN MEN PO) Take by mouth daily     ,Patient states they are no longer utilizing these medication: There are no discontinued medications. I have refilled the following medication today:   Requested Prescriptions      No prescriptions requested or ordered in this encounter   .        Orders Placed This Encounter   Procedures    CBC     Standing Status:   Future     Number of Occurrences:   1     Standing Expiration Date:   9/22/2022    Basic Metabolic Panel     Standing Status:   Future     Number of Occurrences:   1     Standing Expiration Date:   9/22/2022    Lipid Panel     Standing Status:   Future     Number of Occurrences:   1     Standing Expiration Date:   9/22/2022     Order Specific Question:   Is Patient Fasting?/# of Hours     Answer:   8    T4, Free     Standing Status:   Future     Number of Occurrences:   1     Standing Expiration Date:   9/22/2022    TSH without Reflex     Standing Status:   Future     Number of Occurrences:   1     Standing Expiration Date:   9/22/2022    Hepatic Function Panel     Standing Status:   Future     Number of Occurrences:   1     Standing Expiration Date:   9/22/2022    Urinalysis     Standing Status:   Future     Number of Occurrences:   1     Standing Expiration Date:   9/22/2022    PSA, Total and Free     Standing Status:   Future     Number of Occurrences:   1     Standing Expiration Date:   9/22/2022    Vitamin D 25 Hydroxy     Standing Status:   Future     Number of Occurrences:   1     Standing Expiration Date:   9/22/2022    Testosterone Free and Total Male     Standing Status:   Future     Number of Occurrences:   1     Standing Expiration Date:   9/22/2022    Type and Screen     Standing Status:   Future     Number of Occurrences:   1     Standing Expiration Date:   9/22/2022     No orders of the defined types were placed in this encounter.             Julieta Perry MD

## 2021-09-23 DIAGNOSIS — Z86.79 HISTORY OF ACUTE CONGESTIVE HEART FAILURE: ICD-10-CM

## 2021-09-23 DIAGNOSIS — I73.9 PERIPHERAL VASCULAR DISEASE (HCC): ICD-10-CM

## 2021-09-23 RX ORDER — FUROSEMIDE 40 MG/1
TABLET ORAL
Qty: 180 TABLET | Refills: 1 | Status: SHIPPED | OUTPATIENT
Start: 2021-09-23 | End: 2021-09-29

## 2021-09-29 ENCOUNTER — OFFICE VISIT (OUTPATIENT)
Dept: CARDIOLOGY CLINIC | Age: 71
End: 2021-09-29
Payer: COMMERCIAL

## 2021-09-29 VITALS
SYSTOLIC BLOOD PRESSURE: 136 MMHG | HEIGHT: 75 IN | HEART RATE: 69 BPM | WEIGHT: 250 LBS | OXYGEN SATURATION: 98 % | BODY MASS INDEX: 31.08 KG/M2 | DIASTOLIC BLOOD PRESSURE: 78 MMHG

## 2021-09-29 DIAGNOSIS — I73.9 PERIPHERAL VASCULAR DISEASE (HCC): ICD-10-CM

## 2021-09-29 DIAGNOSIS — E78.2 MIXED HYPERLIPIDEMIA: ICD-10-CM

## 2021-09-29 DIAGNOSIS — I25.10 CORONARY ARTERY DISEASE INVOLVING NATIVE CORONARY ARTERY OF NATIVE HEART WITHOUT ANGINA PECTORIS: Primary | ICD-10-CM

## 2021-09-29 DIAGNOSIS — I50.22 CHRONIC SYSTOLIC CONGESTIVE HEART FAILURE (HCC): ICD-10-CM

## 2021-09-29 PROCEDURE — 99214 OFFICE O/P EST MOD 30 MIN: CPT | Performed by: CLINICAL NURSE SPECIALIST

## 2021-09-29 RX ORDER — FUROSEMIDE 20 MG/1
20 TABLET ORAL DAILY
Qty: 90 TABLET | Refills: 3 | Status: SHIPPED | OUTPATIENT
Start: 2021-09-29 | End: 2021-10-14 | Stop reason: SDUPTHER

## 2021-09-29 ASSESSMENT — ENCOUNTER SYMPTOMS
FACIAL SWELLING: 0
CHEST TIGHTNESS: 0
NAUSEA: 0
WHEEZING: 0
EYE REDNESS: 0
COUGH: 0
ABDOMINAL PAIN: 0
VOMITING: 0
SHORTNESS OF BREATH: 1

## 2021-09-29 NOTE — PATIENT INSTRUCTIONS
Return in about 6 months (around 3/29/2022) for APRN. Decrease Lasix (furosemide) to 20mg daily due to frequent urination  Would recommend stopping testosterone with history of heart disease  May stop Isosorbide- let us know if having chest pain    OK to take an extra Lasix for weight gain over 3lbs in 24 hours. If weight is not improving by the next day, call the office.    - Weigh daily and report weight gain of 3lbs or more in 24hrs or 5lbs in one week. - Call for increasing shortness of breath or increasing swelling in feet and legs. (This could mean you are retaining too much fluid)  - 2000mg low sodium diet  - Fluid restriction of 1500ml per day (about 6 cups of fluid per day)    Patient Education        Heart Failure: Care Instructions  Your Care Instructions     Heart failure occurs when your heart does not pump as much blood as the body needs. Failure does not mean that the heart has stopped pumping but rather that it is not pumping as well as it should. Over time, this causes fluid buildup in your lungs and other parts of your body. Fluid buildup can cause shortness of breath, fatigue, swollen ankles, and other problems. By taking medicines regularly, reducing sodium (salt) in your diet, checking your weight every day, and making lifestyle changes, you can feel better and live longer. Follow-up care is a key part of your treatment and safety. Be sure to make and go to all appointments, and call your doctor if you are having problems. It's also a good idea to know your test results and keep a list of the medicines you take. How can you care for yourself at home? Medicines    · Be safe with medicines. Take your medicines exactly as prescribed.  Call your doctor if you think you are having a problem with your medicine.     · Do not take any vitamins, over-the-counter medicine, or herbal products without talking to your doctor first. Radha Vega not take ibuprofen (Advil or Motrin) and naproxen (Aleve) without talking to your doctor first. They could make your heart failure worse.     · You may take some of the following medicine. ? Angiotensin-converting enzyme inhibitors (ACEIs) or angiotensin II receptor blockers (ARBs) reduce the heart's workload, lower blood pressure, and reduce swelling. Taking an ACEI or ARB may lower your chance of needing to be hospitalized. ? Beta-blockers can slow heart rate, decrease blood pressure, and improve your condition. Taking a beta-blocker may lower your chance of needing to be hospitalized. ? Diuretics, also called water pills, reduce swelling. You will get more details on the specific medicines your doctor prescribes. Diet    · Your doctor may suggest that you limit sodium. Your doctor can tell you how much sodium is right for you. An example is less than 3,000 mg a day. This includes all the salt you eat in cooking or in packaged foods. People get most of their sodium from processed foods. Fast food and restaurant meals also tend to be very high in sodium.     · Ask your doctor how much liquid you can drink each day. You may have to limit liquids. Weight    · Weigh yourself without clothing at the same time each day. Record your weight. Call your doctor if you have a sudden weight gain, such as more than 2 to 3 pounds in a day or 5 pounds in a week. (Your doctor may suggest a different range of weight gain.) A sudden weight gain may mean that your heart failure is getting worse. Activity level    · Start light exercise (if your doctor says it is okay). Even if you can only do a small amount, exercise will help you get stronger, have more energy, and manage your weight and your stress. Walking is an easy way to get exercise. Start out by walking a little more than you did before. Bit by bit, increase the amount you walk.     · When you exercise, watch for signs that your heart is working too hard.  You are pushing yourself too hard if you cannot talk while you are exercising. If you become short of breath or dizzy or have chest pain, stop, sit down, and rest.     · If you feel \"wiped out\" the day after you exercise, walk slower or for a shorter distance until you can work up to a better pace.     · Get enough rest at night. Sleeping with 1 or 2 pillows under your upper body and head may help you breathe easier. Lifestyle changes    · Do not smoke. Smoking can make a heart condition worse. If you need help quitting, talk to your doctor about stop-smoking programs and medicines. These can increase your chances of quitting for good. Quitting smoking may be the most important step you can take to protect your heart.     · Limit alcohol to 2 drinks a day for men and 1 drink a day for women. Too much alcohol can cause health problems.     · Avoid getting sick from colds and the flu. Get a pneumococcal vaccine shot. If you have had one before, ask your doctor whether you need another dose. Get a flu shot each year. If you must be around people with colds or the flu, wash your hands often. When should you call for help? Call 911  if you have symptoms of sudden heart failure such as:    · You have severe trouble breathing.     · You cough up pink, foamy mucus.     · You have a new irregular or rapid heartbeat. Call your doctor now or seek immediate medical care if:    · You have new or increased shortness of breath.     · You are dizzy or lightheaded, or you feel like you may faint.     · You have sudden weight gain, such as more than 2 to 3 pounds in a day or 5 pounds in a week. (Your doctor may suggest a different range of weight gain.)     · You have increased swelling in your legs, ankles, or feet.     · You are suddenly so tired or weak that you cannot do your usual activities. Watch closely for changes in your health, and be sure to contact your doctor if you develop new symptoms. Where can you learn more? Go to https://chjacquieb[facility]. org and sign in to your [employer] account. Enter H624 in the [facility] box to learn more about \"Heart Failure: Care Instructions. \"     If you do not have an account, please click on the \"Sign Up Now\" link. Current as of: April 29, 2021               Content Version: 13.0  © 4673-2016 [employer]. Care instructions adapted under license by [facility] If you have questions about a medical condition or this instruction, always ask your healthcare professional. [address] any warranty or liability for your use of this information.

## 2021-09-29 NOTE — PROGRESS NOTES
[facility], [address] [facility] [address], [address] [facility]  [address]  Phone: [phone]  Fax: [phone]    OFFICE VISIT:  2021    Bianca Lynn - : 1950    Reason For Visit:  Marian South is a 66 y.o. male who is here for Follow-up (patient would like to decrease medicines ) and Coronary Artery Disease     Diagnosis Orders   1. Coronary artery disease involving native coronary artery of native heart without angina pectoris     2. Chronic systolic congestive heart failure ([address]     3. Mixed hyperlipidemia     4. Peripheral vascular disease ([address]        HPI  Patient presented to the hospital on 2019 with acute decompensated heart failure, non-STEMI. Cath showed EF of 30 to 35% with PCI to the proximal circumflex, mid and distal LAD. EF improved to 35-40% in Aug 2019, and EF 45% . He denies chest pain. He has some mild shortness of breath, but denies orthopnea , PND, edema, or sudden weight gain. He was hospitalized in the spring for amputation of his toes due to PVD. Follows with vascular surgery    Patient states his main objective today is to get off any prescription medications as possible. He has been under some stress in recent months with a separation from his wife he states. Tanisha Guidry MD is PCP.   Bianca Shane has the following history as recorded in [facility]:    Patient Active Problem List    Diagnosis Date Noted    History of amputation of lesser toe of right foot ([facility] 2021    Open toe wound 2019    Chronic systolic CHF (congestive heart failure) ([address] 2019    Coronary artery disease involving native coronary artery of native heart without angina pectoris 2019    Hypotension due to drugs 2019    AAA (abdominal aortic aneurysm) without rupture ([address] 2019    History of acute congestive heart failure     NSTEMI (non-ST elevated myocardial infarction) ([address] 2019    Acute pain of right shoulder tablet 3    aspirin 81 MG chewable tablet TAKE 1 TABLET BY MOUTH EVERY DAY 90 tablet 3    Omega-3 Fatty Acids (FISH OIL PO) Take by mouth daily      Multiple Vitamins-Minerals (ONE DAILY MULTIVITAMIN MEN PO) Take by mouth daily      pentoxifylline (TRENTAL) 400 MG extended release tablet Take 1 tablet by mouth 2 times daily (with meals) Do not crush or break. 180 tablet 3     No current facility-administered medications for this visit. Allergies: Penicillins    Review of Systems  Review of Systems   Constitutional: Negative for activity change, diaphoresis, fatigue, fever and unexpected weight change. HENT: Negative for facial swelling and nosebleeds. Eyes: Negative for redness and visual disturbance. Respiratory: Positive for shortness of breath (mild, unchanged). Negative for cough, chest tightness and wheezing. Cardiovascular: Negative for chest pain, palpitations and leg swelling. Gastrointestinal: Negative for abdominal pain, nausea and vomiting. Endocrine: Negative for cold intolerance and heat intolerance. Genitourinary: Negative for dysuria and hematuria. Musculoskeletal: Negative for arthralgias and myalgias. Skin: Negative for pallor and rash. Neurological: Negative for dizziness, seizures, syncope, weakness and light-headedness. Hematological: Does not bruise/bleed easily. Psychiatric/Behavioral: Negative for agitation. The patient is not nervous/anxious. Objective  Vital Signs - /78   Pulse 69   Ht 6' 3\" (1.905 m)   Wt 250 lb (113.4 kg)   SpO2 98%   BMI 31.25 kg/m²    Wt Readings from Last 3 Encounters:   09/29/21 250 lb (113.4 kg)   09/22/21 249 lb 3.2 oz (113 kg)   05/20/21 239 lb (108.4 kg)        BP Readings from Last 3 Encounters:   09/29/21 136/78   09/22/21 112/78   05/20/21 113/70    Pulse Readings from Last 3 Encounters:   09/29/21 69   09/22/21 65   05/20/21 74        Physical Exam  Vitals and nursing note reviewed.    Constitutional: General: He is not in acute distress. Appearance: Normal appearance. He is well-developed. HENT:      Head: Normocephalic and atraumatic. Right Ear: Hearing and external ear normal.      Left Ear: Hearing and external ear normal.      Nose: Nose normal.   Eyes:      General:         Right eye: No discharge. Left eye: No discharge. Pupils: Pupils are equal, round, and reactive to light. Neck:      Thyroid: No thyromegaly. Vascular: No carotid bruit or JVD. Trachea: No tracheal deviation. Cardiovascular:      Rate and Rhythm: Normal rate and regular rhythm. Heart sounds: Normal heart sounds. No murmur heard. No friction rub. No gallop. Comments: No carotid bruit  Pulmonary:      Effort: Pulmonary effort is normal. No respiratory distress. Breath sounds: Normal breath sounds. No wheezing or rales. Abdominal:      Palpations: Abdomen is soft. Tenderness: There is no abdominal tenderness. Musculoskeletal:         General: No swelling or deformity. Cervical back: Neck supple. No muscular tenderness. Right lower leg: No edema. Left lower leg: No edema. Comments: Normal gait and station   Skin:     General: Skin is warm and dry. Findings: No rash. Neurological:      Mental Status: He is alert and oriented to person, place, and time. Cranial Nerves: No cranial nerve deficit. Psychiatric:         Behavior: Behavior normal.         Judgment: Judgment normal.         Data:  Heart Cath 5/19  Conclusions    Severe double vessel disease with subacute thrombotic occlusion of   proximal circumflex with faint distal collaterals and diffuse proximal mid   and distal LAD disease. Severe LV dysfunction with moderate left ventricular dilation and markedly   elevated LV end-diastolic pressures.    Successful PCI to proximal circumflex, proximal mid and distal LAD with   drug-eluting stents    Recommendations    Medical management   Heart failure management   Reevaluate LV function in 3 months with consideration for ICD if ejection   fraction less than 35%. Plavix to remain on uninterrupted for 1 year and would keep on Plavix   lifelong. Echo 8/19  Summary   Limited study to assess LV function. Mild left ventricular enlargement with global hypokinesis estimated   ejection fraction 35-40%   Mild concentric left ventricular hypertrophy. Echo 6/17/20  Conclusions      Summary   Left ventricular size is mildly increased. Generalized hypokinesis of the left ventricle with impairment of LV   systolic function. Left ventricular ejection fraction is visually estimated at 45%. E/A flow reversal noted. Suggestive of diastolic dysfunction. Signature      ----------------------------------------------------------------   Electronically signed by Drea Batista MD(Interpreting   physician) on 06/17/2020 07:08 PM   ----------------------------------------------------------------      Lab Results   Component Value Date    CHOL 136 (L) 09/22/2021    TRIG 123 09/22/2021    HDL 51 (L) 09/22/2021    LDLCALC 60 09/22/2021     Lab Results   Component Value Date    ALT 16 09/22/2021    AST 13 09/22/2021     Lab Results   Component Value Date     09/22/2021    K 4.6 09/22/2021    K 4.5 05/18/2019     09/22/2021    CO2 25 09/22/2021    BUN 22 09/22/2021    CREATININE 1.0 09/22/2021    GLUCOSE 103 09/22/2021    CALCIUM 9.4 09/22/2021      Echo 6/17/20  Conclusions    Summary   Left ventricular size is mildly increased. Generalized hypokinesis of the left ventricle with impairment of LV   systolic function. Left ventricular ejection fraction is visually estimated at 45%. E/A flow reversal noted. Suggestive of diastolic dysfunction. Assessment:     Diagnosis Orders   1. Coronary artery disease involving native coronary artery of native heart without angina pectoris     2. Chronic systolic congestive heart failure ([address]     3.  Mixed hyperlipidemia     4. Peripheral vascular disease ([facility]         CAD-  Stable without angina symptoms. Continue aspirin, atorvastatin, carvedilol    Chronic systolic heart failure NYHA class II, stage C EF  45% 2020-  appears euvolemic on guideline directed medical therapy with carvedilol and Entresto. Issues with frequent urination and wants to know if he can cut back his Lasix. Will decrease to 20 mg daily. He can take an extra 20 mg for sudden weight gain of 3 pounds or more in 24 hours or 5 pounds in a week Following low-sodium diet closely. Hyperlipidemia stable on statin therapy with atorvastatin    PVDamputation of toe this spring due to a nonhealing wound. He follows with vascular surgery and will be seeing them again next month. He is not taking his Trental currently. Will discuss further with him at that time    Had a long discussion about medications. Patient is wanting to come off his many things as possible. His PCP took him off Plavix. Suggested he discontinue testosterone due to the increased potential for clotting. He will be discussing Trental with vascular surgery. He may stop isosorbide and if he has issues with chest pain he will resume. We discussed the importance of all other medications in relationship to his heart disease and CHF diagnosis and recommended continue all other medications. Verbalizes understanding    Stable cardiovascular status. No evidence of overt heart failure,angina or dysrhythmia. Plan    Return in about 6 months (around 3/29/2022) for APRN. Decrease Lasix (furosemide) to 20mg daily due to frequent urination  Would recommend stopping testosterone with history of heart disease  May stop Isosorbide- let us know if having chest pain    OK to take an extra Lasix for weight gain over 3lbs in 24 hours. If weight is not improving by the next day, call the office.    - Weigh daily and report weight gain of 3lbs or more in 24hrs or 5lbs in one week.   - Call for increasing shortness of breath or increasing swelling in feet and legs. (This could mean you are retaining too much fluid)  - 2000mg low sodium diet  - Fluid restriction of 1500ml per day (about 6 cups of fluid per day)    Call with any questionsor concerns  Follow up with Annabel Garcia MD for non cardiac problems  Report any new problems  Cardiovascular Fitness-Exercise as tolerated. Strive for 15 minutes of exercise most days of the week. Cardiac / HealthyDiet  Continue current medications as directed  Continue plan of treatment  It is always recommended that you bring your medicationsbottles with you to each visit - this is for your safety!        CHRIS Lawrence

## 2021-09-30 ENCOUNTER — TELEPHONE (OUTPATIENT)
Dept: CARDIOLOGY CLINIC | Age: 71
End: 2021-09-30

## 2021-09-30 NOTE — TELEPHONE ENCOUNTER
Date: TBD    Cardiologist: Dr. Sylvia Dean    Procedure: Cataract Surgery    Surgeon: Jamie Baumann    Last Office Visit: 9-29-21    Reason for office visit and medical concerns addressed at this office visit: CAD, CHF, PVD, AAA, HTN, hyperlipidemia    Testing Performed and Date of Service:  EKG 4-13-21  Echo 6-17-20    RCRI = 2 pts, elevated, 6.6%   METs 4    Current Medications: lasix, nitro, lipitor, viagra, trental, entresto, coreg, ASA, omega 3    Is the patient currently taking an anticoagulant? If so, what is the diagnosis the patient has been given to warrant the need for the anticoagulant?  ASA    Additional Notes: Cardiac Risk Request

## 2021-10-14 RX ORDER — ASPIRIN 81 MG/1
TABLET, CHEWABLE ORAL
Qty: 90 TABLET | Refills: 3 | Status: SHIPPED | OUTPATIENT
Start: 2021-10-14

## 2021-10-14 RX ORDER — FUROSEMIDE 20 MG/1
20 TABLET ORAL DAILY
Qty: 90 TABLET | Refills: 3 | Status: SHIPPED | OUTPATIENT
Start: 2021-10-14 | End: 2022-05-19 | Stop reason: SDUPTHER

## 2021-11-07 ENCOUNTER — HOSPITAL ENCOUNTER (INPATIENT)
Age: 71
LOS: 2 days | Discharge: HOME | DRG: 243 | End: 2021-11-09
Attending: EMERGENCY MEDICINE | Admitting: INTERNAL MEDICINE
Payer: MEDICARE

## 2021-11-07 ENCOUNTER — APPOINTMENT (OUTPATIENT)
Dept: GENERAL RADIOLOGY | Age: 71
DRG: 243 | End: 2021-11-07
Payer: MEDICARE

## 2021-11-07 DIAGNOSIS — I44.2 COMPLETE HEART BLOCK (HCC): Primary | ICD-10-CM

## 2021-11-07 DIAGNOSIS — N17.9 AKI (ACUTE KIDNEY INJURY) (HCC): ICD-10-CM

## 2021-11-07 LAB
ALBUMIN SERPL-MCNC: 4.3 G/DL (ref 3.5–5.2)
ALP BLD-CCNC: 81 U/L (ref 40–130)
ALT SERPL-CCNC: 28 U/L (ref 5–41)
ANION GAP SERPL CALCULATED.3IONS-SCNC: 11 MMOL/L (ref 7–19)
APTT: 26.6 SEC (ref 26–36.2)
AST SERPL-CCNC: 20 U/L (ref 5–40)
BILIRUB SERPL-MCNC: 0.6 MG/DL (ref 0.2–1.2)
BUN BLDV-MCNC: 26 MG/DL (ref 8–23)
CALCIUM SERPL-MCNC: 9.9 MG/DL (ref 8.8–10.2)
CHLORIDE BLD-SCNC: 104 MMOL/L (ref 98–111)
CO2: 23 MMOL/L (ref 22–29)
CREAT SERPL-MCNC: 1.5 MG/DL (ref 0.5–1.2)
GFR AFRICAN AMERICAN: 55
GFR NON-AFRICAN AMERICAN: 46
GLUCOSE BLD-MCNC: 104 MG/DL (ref 74–109)
HCT VFR BLD CALC: 44 % (ref 42–52)
HEMOGLOBIN: 14.1 G/DL (ref 14–18)
INR BLD: 0.98 (ref 0.88–1.18)
LACTIC ACID: 1.2 MMOL/L (ref 0.5–1.9)
MAGNESIUM: 2.4 MG/DL (ref 1.6–2.4)
MCH RBC QN AUTO: 31.9 PG (ref 27–31)
MCHC RBC AUTO-ENTMCNC: 32 G/DL (ref 33–37)
MCV RBC AUTO: 99.5 FL (ref 80–94)
PDW BLD-RTO: 13.2 % (ref 11.5–14.5)
PLATELET # BLD: 169 K/UL (ref 130–400)
PMV BLD AUTO: 10 FL (ref 9.4–12.4)
POTASSIUM REFLEX MAGNESIUM: 5 MMOL/L (ref 3.5–5)
POTASSIUM SERPL-SCNC: 5.2 MMOL/L (ref 3.5–5)
PRO-BNP: 1499 PG/ML (ref 0–900)
PROTHROMBIN TIME: 13.2 SEC (ref 12–14.6)
RBC # BLD: 4.42 M/UL (ref 4.7–6.1)
SARS-COV-2, NAAT: NOT DETECTED
SODIUM BLD-SCNC: 138 MMOL/L (ref 136–145)
TOTAL CK: 106 U/L (ref 39–308)
TOTAL PROTEIN: 7 G/DL (ref 6.6–8.7)
TROPONIN: <0.01 NG/ML (ref 0–0.03)
TSH REFLEX FT4: 2.62 UIU/ML (ref 0.35–5.5)
WBC # BLD: 10.4 K/UL (ref 4.8–10.8)

## 2021-11-07 PROCEDURE — 96374 THER/PROPH/DIAG INJ IV PUSH: CPT

## 2021-11-07 PROCEDURE — 83735 ASSAY OF MAGNESIUM: CPT

## 2021-11-07 PROCEDURE — 87635 SARS-COV-2 COVID-19 AMP PRB: CPT

## 2021-11-07 PROCEDURE — 99285 EMERGENCY DEPT VISIT HI MDM: CPT

## 2021-11-07 PROCEDURE — 71045 X-RAY EXAM CHEST 1 VIEW: CPT

## 2021-11-07 PROCEDURE — 82550 ASSAY OF CK (CPK): CPT

## 2021-11-07 PROCEDURE — 80053 COMPREHEN METABOLIC PANEL: CPT

## 2021-11-07 PROCEDURE — 6360000002 HC RX W HCPCS: Performed by: INTERNAL MEDICINE

## 2021-11-07 PROCEDURE — 2580000003 HC RX 258: Performed by: EMERGENCY MEDICINE

## 2021-11-07 PROCEDURE — 2580000003 HC RX 258: Performed by: INTERNAL MEDICINE

## 2021-11-07 PROCEDURE — 85027 COMPLETE CBC AUTOMATED: CPT

## 2021-11-07 PROCEDURE — 83605 ASSAY OF LACTIC ACID: CPT

## 2021-11-07 PROCEDURE — 85730 THROMBOPLASTIN TIME PARTIAL: CPT

## 2021-11-07 PROCEDURE — 83880 ASSAY OF NATRIURETIC PEPTIDE: CPT

## 2021-11-07 PROCEDURE — 84443 ASSAY THYROID STIM HORMONE: CPT

## 2021-11-07 PROCEDURE — 6370000000 HC RX 637 (ALT 250 FOR IP): Performed by: EMERGENCY MEDICINE

## 2021-11-07 PROCEDURE — 6360000002 HC RX W HCPCS: Performed by: EMERGENCY MEDICINE

## 2021-11-07 PROCEDURE — 36415 COLL VENOUS BLD VENIPUNCTURE: CPT

## 2021-11-07 PROCEDURE — 2000000000 HC ICU R&B

## 2021-11-07 PROCEDURE — 84484 ASSAY OF TROPONIN QUANT: CPT

## 2021-11-07 PROCEDURE — 85610 PROTHROMBIN TIME: CPT

## 2021-11-07 PROCEDURE — 84132 ASSAY OF SERUM POTASSIUM: CPT

## 2021-11-07 PROCEDURE — 99222 1ST HOSP IP/OBS MODERATE 55: CPT | Performed by: INTERNAL MEDICINE

## 2021-11-07 RX ORDER — PENTOXIFYLLINE 400 MG/1
400 TABLET, EXTENDED RELEASE ORAL 2 TIMES DAILY WITH MEALS
Status: DISCONTINUED | OUTPATIENT
Start: 2021-11-07 | End: 2021-11-09 | Stop reason: HOSPADM

## 2021-11-07 RX ORDER — DOPAMINE HYDROCHLORIDE 160 MG/100ML
2-20 INJECTION, SOLUTION INTRAVENOUS CONTINUOUS
Status: DISCONTINUED | OUTPATIENT
Start: 2021-11-07 | End: 2021-11-09 | Stop reason: HOSPADM

## 2021-11-07 RX ORDER — ATROPINE SULFATE 0.1 MG/ML
0.5 INJECTION INTRAVENOUS ONCE
Status: COMPLETED | OUTPATIENT
Start: 2021-11-07 | End: 2021-11-07

## 2021-11-07 RX ORDER — ONDANSETRON 4 MG/1
4 TABLET, ORALLY DISINTEGRATING ORAL EVERY 8 HOURS PRN
Status: DISCONTINUED | OUTPATIENT
Start: 2021-11-07 | End: 2021-11-09 | Stop reason: HOSPADM

## 2021-11-07 RX ORDER — SODIUM CHLORIDE 0.9 % (FLUSH) 0.9 %
10 SYRINGE (ML) INJECTION PRN
Status: DISCONTINUED | OUTPATIENT
Start: 2021-11-07 | End: 2021-11-09 | Stop reason: HOSPADM

## 2021-11-07 RX ORDER — SODIUM CHLORIDE 9 MG/ML
25 INJECTION, SOLUTION INTRAVENOUS PRN
Status: DISCONTINUED | OUTPATIENT
Start: 2021-11-07 | End: 2021-11-09 | Stop reason: HOSPADM

## 2021-11-07 RX ORDER — ACETAMINOPHEN 325 MG/1
650 TABLET ORAL EVERY 6 HOURS PRN
Status: DISCONTINUED | OUTPATIENT
Start: 2021-11-07 | End: 2021-11-09 | Stop reason: HOSPADM

## 2021-11-07 RX ORDER — METOCLOPRAMIDE HYDROCHLORIDE 5 MG/ML
10 INJECTION INTRAMUSCULAR; INTRAVENOUS EVERY 6 HOURS
Status: DISCONTINUED | OUTPATIENT
Start: 2021-11-07 | End: 2021-11-09 | Stop reason: HOSPADM

## 2021-11-07 RX ORDER — ONDANSETRON 2 MG/ML
4 INJECTION INTRAMUSCULAR; INTRAVENOUS EVERY 6 HOURS PRN
Status: DISCONTINUED | OUTPATIENT
Start: 2021-11-07 | End: 2021-11-09 | Stop reason: HOSPADM

## 2021-11-07 RX ORDER — ACETAMINOPHEN 650 MG/1
650 SUPPOSITORY RECTAL EVERY 6 HOURS PRN
Status: DISCONTINUED | OUTPATIENT
Start: 2021-11-07 | End: 2021-11-09 | Stop reason: HOSPADM

## 2021-11-07 RX ORDER — SODIUM CHLORIDE 0.9 % (FLUSH) 0.9 %
10 SYRINGE (ML) INJECTION EVERY 12 HOURS SCHEDULED
Status: DISCONTINUED | OUTPATIENT
Start: 2021-11-07 | End: 2021-11-09 | Stop reason: HOSPADM

## 2021-11-07 RX ORDER — ATORVASTATIN CALCIUM 40 MG/1
40 TABLET, FILM COATED ORAL DAILY
Status: DISCONTINUED | OUTPATIENT
Start: 2021-11-07 | End: 2021-11-09 | Stop reason: HOSPADM

## 2021-11-07 RX ORDER — ASPIRIN 81 MG/1
81 TABLET, CHEWABLE ORAL DAILY
Status: DISCONTINUED | OUTPATIENT
Start: 2021-11-07 | End: 2021-11-09 | Stop reason: HOSPADM

## 2021-11-07 RX ORDER — 0.9 % SODIUM CHLORIDE 0.9 %
500 INTRAVENOUS SOLUTION INTRAVENOUS ONCE
Status: COMPLETED | OUTPATIENT
Start: 2021-11-07 | End: 2021-11-07

## 2021-11-07 RX ADMIN — ATROPINE SULFATE 0.5 MG: 0.1 INJECTION, SOLUTION ENDOTRACHEAL; INTRAMUSCULAR; INTRAVENOUS; SUBCUTANEOUS at 15:08

## 2021-11-07 RX ADMIN — DOPAMINE HYDROCHLORIDE 2 MCG/KG/MIN: 160 INJECTION, SOLUTION INTRAVENOUS at 15:13

## 2021-11-07 RX ADMIN — ASPIRIN 325 MG: 325 TABLET, DELAYED RELEASE ORAL at 14:52

## 2021-11-07 RX ADMIN — SODIUM CHLORIDE, PRESERVATIVE FREE 10 ML: 5 INJECTION INTRAVENOUS at 20:17

## 2021-11-07 RX ADMIN — METOCLOPRAMIDE HYDROCHLORIDE 10 MG: 5 INJECTION INTRAMUSCULAR; INTRAVENOUS at 17:44

## 2021-11-07 RX ADMIN — ONDANSETRON 4 MG: 2 INJECTION INTRAMUSCULAR; INTRAVENOUS at 16:55

## 2021-11-07 RX ADMIN — DOPAMINE HYDROCHLORIDE 15 MCG/KG/MIN: 160 INJECTION, SOLUTION INTRAVENOUS at 20:00

## 2021-11-07 RX ADMIN — SODIUM CHLORIDE 500 ML: 9 INJECTION, SOLUTION INTRAVENOUS at 15:12

## 2021-11-07 RX ADMIN — DOPAMINE HYDROCHLORIDE 15 MCG/KG/MIN: 160 INJECTION, SOLUTION INTRAVENOUS at 23:12

## 2021-11-07 ASSESSMENT — PAIN DESCRIPTION - PAIN TYPE: TYPE: ACUTE PAIN

## 2021-11-07 ASSESSMENT — ENCOUNTER SYMPTOMS
BACK PAIN: 0
CONSTIPATION: 0
SHORTNESS OF BREATH: 1
VOICE CHANGE: 0
EYE PAIN: 0
COUGH: 0
ABDOMINAL PAIN: 0
DIARRHEA: 0
BLOOD IN STOOL: 0
ABDOMINAL DISTENTION: 0
WHEEZING: 0
SHORTNESS OF BREATH: 0
COLOR CHANGE: 0
VOMITING: 0
NAUSEA: 0

## 2021-11-07 ASSESSMENT — PAIN SCALES - GENERAL
PAINLEVEL_OUTOF10: 0
PAINLEVEL_OUTOF10: 0
PAINLEVEL_OUTOF10: 5

## 2021-11-07 ASSESSMENT — PAIN DESCRIPTION - DESCRIPTORS: DESCRIPTORS: TIGHTNESS

## 2021-11-07 ASSESSMENT — PAIN DESCRIPTION - LOCATION: LOCATION: CHEST

## 2021-11-07 NOTE — PLAN OF CARE
Problem: Discharge Planning:  Goal: Participates in care planning  Description: Participates in care planning  Outcome: Ongoing  Goal: Discharged to appropriate level of care  Description: Discharged to appropriate level of care  Outcome: Ongoing     Problem: Airway Clearance - Ineffective:  Goal: Ability to maintain a clear airway will improve  Description: Ability to maintain a clear airway will improve  Outcome: Ongoing     Problem: Anxiety/Stress:  Goal: Level of anxiety will decrease  Description: Level of anxiety will decrease  Outcome: Ongoing     Problem: Aspiration:  Goal: Absence of aspiration  Description: Absence of aspiration  Outcome: Ongoing     Problem: Bowel Function - Altered:  Goal: Bowel elimination is within specified parameters  Description: Bowel elimination is within specified parameters  Outcome: Ongoing     Problem: Cardiac Output - Decreased:  Goal: Hemodynamic stability will improve  Description: Hemodynamic stability will improve  Outcome: Ongoing     Problem: Fluid Volume - Imbalance:  Goal: Absence of imbalanced fluid volume signs and symptoms  Description: Absence of imbalanced fluid volume signs and symptoms  Outcome: Ongoing     Problem: Gas Exchange - Impaired:  Goal: Levels of oxygenation will improve  Description: Levels of oxygenation will improve  Outcome: Ongoing     Problem: Mental Status - Impaired:  Goal: Mental status will be restored to baseline  Description: Mental status will be restored to baseline  Outcome: Ongoing     Problem: Nutrition Deficit:  Goal: Ability to achieve adequate nutritional intake will improve  Description: Ability to achieve adequate nutritional intake will improve  Outcome: Ongoing     Problem: Pain:  Description: Pain management should include both nonpharmacologic and pharmacologic interventions.   Goal: Pain level will decrease  Description: Pain level will decrease  Outcome: Ongoing  Goal: Recognizes and communicates pain  Description: Recognizes and communicates pain  Outcome: Ongoing  Goal: Control of acute pain  Description: Control of acute pain  Outcome: Ongoing  Goal: Control of chronic pain  Description: Control of chronic pain  Outcome: Ongoing     Problem: Serum Glucose Level - Abnormal:  Goal: Ability to maintain appropriate glucose levels will improve to within specified parameters  Description: Ability to maintain appropriate glucose levels will improve to within specified parameters  Outcome: Ongoing     Problem: Skin Integrity - Impaired:  Goal: Will show no infection signs and symptoms  Description: Will show no infection signs and symptoms  Outcome: Ongoing  Goal: Absence of new skin breakdown  Description: Absence of new skin breakdown  Outcome: Ongoing     Problem: Sleep Pattern Disturbance:  Goal: Appears well-rested  Description: Appears well-rested  Outcome: Ongoing     Problem: Tissue Perfusion, Altered:  Goal: Circulatory function within specified parameters  Description: Circulatory function within specified parameters  Outcome: Ongoing     Problem: Tissue Perfusion - Cardiopulmonary, Altered:  Goal: Absence of angina  Description: Absence of angina  Outcome: Ongoing  Goal: Hemodynamic stability will improve  Description: Hemodynamic stability will improve  Outcome: Ongoing     Problem: Cardiac Output - Decreased:  Goal: Hemodynamic stability will improve  Description: Hemodynamic stability will improve  Outcome: Ongoing

## 2021-11-07 NOTE — H&P
Hospital Medicine H&P    Patient:  Federico Hill  MRN: [id number]    Consulting Physician: Brooke Garcia MD  Reason for Consult: Medical Management  Primacy Care Physician: Pedro Purcell MD    HISTORY OF PRESENT ILLNESS:   The patient is a 66 y.o. male presents with acute onset of dizziness and lightheadedness. He was in his usual state of health until this morning. He was helping his son-in-law move some furniture this morning when he developed the above-noted symptoms. Whenever he tried to exert himself he would become dizzy and lightheaded. He came into the emergency department for evaluation with and was shown to be in complete heart block with a ventricular rate in the 20s. Cardiology has been contacted. We will admit him to the ICU and begin a dopamine infusion and monitoring. He denies any angina or dyspnea at present. Blood pressure is stable. Past Medical History:        Diagnosis Date    AAA (abdominal aortic aneurysm) ([address]     CAD (coronary artery disease)     CARDIAC STENTS X 4    [facility]     Gout     History of acute congestive heart failure     Hyperlipidemia     Hypertension     Neuropathy involving both lower extremities        Past Surgical History:        Procedure Laterality Date    CARDIAC CATHETERIZATION  05/2019    HERNIA REPAIR      TOE AMPUTATION Right 4/15/2021    RIGHT THIRD TOE AMPUTATION performed by Gracia Chaudhary DO at [facility] OR       Medications: Scheduled Meds:   sodium chloride  500 mL IntraVENous Once     Continuous Infusions:   DOPamine 2 mcg/kg/min (11/07/21 1513)     PRN Meds:. Allergies:  Penicillins    Social History:   TOBACCO:   reports that he quit smoking about 41 years ago. His smoking use included cigarettes. He smoked 1.00 pack per day. He has never used smokeless tobacco.  ETOH:   reports no history of alcohol use.     Family History:       Problem Relation Age of Onset    Heart Surgery Brother     Heart Disease Brother        ROS:  Review of Systems   Constitutional: Negative for activity change and fever. HENT: Negative for congestion and voice change. Eyes: Negative for visual disturbance. Respiratory: Negative for shortness of breath. Cardiovascular: Negative for chest pain and leg swelling. Gastrointestinal: Negative for constipation, diarrhea, nausea and vomiting. Endocrine: Negative for polyuria. Genitourinary: Negative for difficulty urinating and dysuria. Musculoskeletal: Negative for back pain and neck pain. Skin: Negative for color change. Allergic/Immunologic: Negative for immunocompromised state. Neurological: Positive for dizziness and light-headedness. Psychiatric/Behavioral: The patient is not nervous/anxious. Physical Exam:    Vitals: BP (!) 100/58   Pulse (!) 27   Temp 98.3 °F (36.8 °C) (Oral)   Resp 13   Wt 236 lb (107 kg)   SpO2 94%   BMI 29.50 kg/m²     Physical Exam  Vitals and nursing note reviewed. HENT:      Head: Normocephalic and atraumatic. Right Ear: External ear normal.      Left Ear: External ear normal.      Nose: Nose normal.      Mouth/Throat:      Mouth: Mucous membranes are moist.   Eyes:      Conjunctiva/sclera: Conjunctivae normal.      Pupils: Pupils are equal, round, and reactive to light. Cardiovascular:      Rate and Rhythm: Normal rate and regular rhythm. Heart sounds: Normal heart sounds. Pulmonary:      Effort: Pulmonary effort is normal.      Breath sounds: Normal breath sounds. Abdominal:      General: Abdomen is flat. Palpations: Abdomen is soft. Musculoskeletal:         General: No swelling. Cervical back: Neck supple. No rigidity. Skin:     General: Skin is warm and dry. Neurological:      Mental Status: He is oriented to person, place, and time. Psychiatric:         Mood and Affect: Mood normal.         Thought Content:  Thought content normal.         CBC:   Recent Labs     11/07/21  1429

## 2021-11-07 NOTE — ED PROVIDER NOTES
[facility] EMERGENCY DEPT  eMERGENCY dEPARTMENT eNCOUnter      Pt Name: Mil Tony  MRN: [id number]  [place] [birth year]  Date of evaluation: 11/7/2021  Provider: Sosa Beltran MD    [address] [place] [address]       Chief Complaint   Patient presents with    Shortness of Breath    Chest Pain         HISTORY OF PRESENT ILLNESS   (Location/Symptom, Timing/Onset,Context/Setting, Quality, Duration, Modifying Factors, Severity)  Note limiting factors. Mil Tony is a 66 y.o. male who presents to the emergency department due to shortness of breath and lightheadedness. Patient said that he was helping his son in the yard today and noticed he had some mild shortness of breath and felt a little lightheaded. Asymptomatic at this time while in bed but he said when he is up moving around he feels lightheaded and feels a little winded. Denies any chest pain but said he did have a little chest pain a few days ago which is since resolved. No fever. Mild cough which has been nonproductive. No hemoptysis. No unilateral leg swelling or pain. No history of DVT or PE. Has history of coronary disease and 4 stents 2 years ago. Followed by Dr. Hector Downey. No history of arrhythmia. EKG here shows complete heart block. No recent medication changes except for lowering his Lasix dose from 40 to 20 mg about a month ago. HPI    NursingNotes were reviewed. REVIEW OF SYSTEMS    (2-9 systems for level 4, 10 or more for level 5)     Review of Systems   Constitutional: Negative for fever. Eyes: Negative for pain and visual disturbance. Respiratory: Positive for shortness of breath. Cardiovascular: Positive for chest pain (few days ago, resolved). Negative for palpitations. Gastrointestinal: Negative for abdominal pain, diarrhea and vomiting. Genitourinary: Negative for dysuria. Skin: Negative for rash. Neurological: Positive for light-headedness. Negative for weakness and headaches.    All other systems reviewed and are negative. A complete review of systems was performed and is negative except as noted above in the HPI. PAST MEDICAL HISTORY     Past Medical History:   Diagnosis Date    AAA (abdominal aortic aneurysm) ([address]     CAD (coronary artery disease)     CARDIAC STENTS X 4    CHF (congestive heart failure) (HCC)     Gout     History of acute congestive heart failure     Hyperlipidemia     Hypertension     Neuropathy involving both lower extremities          SURGICAL HISTORY       Past Surgical History:   Procedure Laterality Date    CARDIAC CATHETERIZATION  05/2019    HERNIA REPAIR      TOE AMPUTATION Right 4/15/2021    RIGHT THIRD TOE AMPUTATION performed by Ellen Med[facility] DO [facility]       Previous Medications    ASPIRIN 81 MG CHEWABLE TABLET    TAKE 1 TABLET BY MOUTH EVERY DAY    ATORVASTATIN (LIPITOR) 40 MG TABLET    Take 1 tablet by mouth daily at night. CARVEDILOL (COREG) 3.125 MG TABLET    Take 1 tablet by mouth 2 times daily    ENTRESTO 24-26 MG PER TABLET    TAKE 1 TABLET BY MOUTH TWICE A DAY    FUROSEMIDE (LASIX) 20 MG TABLET    Take 1 tablet by mouth daily    MULTIPLE VITAMINS-MINERALS (MULTIVITAMIN ADULT, MINERALS,) TABS    Take 1 tablet by mouth daily    MULTIPLE VITAMINS-MINERALS (ONE DAILY MULTIVITAMIN MEN PO)    Take by mouth daily    NITROGLYCERIN (NITROSTAT) 0.4 MG SL TABLET    up to max of 3 total doses. If no relief after 1 dose, call 911. OMEGA-3 FATTY ACIDS (FISH OIL PO)    Take by mouth daily    PENTOXIFYLLINE (TRENTAL) 400 MG EXTENDED RELEASE TABLET    Take 1 tablet by mouth 2 times daily (with meals) Do not crush or break.     SILDENAFIL (VIAGRA) 100 MG TABLET    TAKE 1 TABLET BY MOUTH AS NEEDED FOR ERECTILE DYSFUNCTION       ALLERGIES     Penicillins    FAMILY HISTORY       Family History   Problem Relation Age of Onset    Heart Surgery Brother     Heart Disease Brother           SOCIAL HISTORY       Social History     Socioeconomic History    Marital status: Legally      Spouse name: None    Number of children: None    Years of education: None    Highest education level: None   Occupational History    None   Tobacco Use    Smoking status: Former Smoker     Packs/day: 1.00     Types: Cigarettes     Quit date: 1980     Years since quittin.4    Smokeless tobacco: Never Used    Tobacco comment: quit over 25 yrs ago   Vaping Use    Vaping Use: Never used   Substance and Sexual Activity    Alcohol use: No    Drug use: No    Sexual activity: None   Other Topics Concern    None   Social History Narrative    None     Social Determinants of Health     Financial Resource Strain:     Difficulty of Paying Living Expenses: Not on file   Food Insecurity:     Worried About Running Out of Food in the Last Year: Not on file    Heaven of Food in the Last Year: Not on file   Transportation Needs:     Lack of Transportation (Medical): Not on file    Lack of Transportation (Non-Medical):  Not on file   Physical Activity:     Days of Exercise per Week: Not on file    Minutes of Exercise per Session: Not on file   Stress:     Feeling of Stress : Not on file   Social Connections:     Frequency of Communication with Friends and Family: Not on file    Frequency of Social Gatherings with Friends and Family: Not on file    Attends [employer] Services: Not on file    Active Member of [address] [place] [address] or Organizations: Not on file    Attends Club or Organization Meetings: Not on file    Marital Status: Not on file   Intimate Partner Violence:     Fear of Current or Ex-Partner: Not on file    Emotionally Abused: Not on file    Physically Abused: Not on file    Sexually Abused: Not on file   Housing Stability:     Unable to Pay for Housing in the Last Year: Not on file    Number of [place] in the Last Year: Not on file    Unstable Housing in the Last Year: Not on file       SCREENINGS    Lugoff Coma Scale  Eye Opening: Spontaneous  Best Verbal Response: Oriented  Best Motor Response: Obeys commands  Singh Coma Scale Score: 15        PHYSICAL EXAM    (up to 7 for level 4, 8 or more for level 5)     ED Triage Vitals [11/07/21 1422]   BP Temp Temp src Pulse Resp SpO2 Height Weight   (!) 129/52 -- -- (!) 30 18 98 % -- --       Physical Exam  Vitals reviewed. Constitutional:       General: He is not in acute distress. Appearance: He is well-developed. HENT:      Head: Normocephalic and atraumatic. Eyes:      General: No scleral icterus. Extraocular Movements: Extraocular movements intact. Pupils: Pupils are equal, round, and reactive to light. Neck:      Vascular: No JVD. Cardiovascular:      Rate and Rhythm: Bradycardia present. Rhythm irregular. Pulses: Normal pulses. Heart sounds: Normal heart sounds. No murmur heard. Pulmonary:      Effort: Pulmonary effort is normal. No respiratory distress. Breath sounds: Normal breath sounds. Abdominal:      General: There is no distension. Palpations: Abdomen is soft. Tenderness: There is no abdominal tenderness. There is no guarding or rebound. Musculoskeletal:         General: No tenderness. Cervical back: Normal range of motion and neck supple. Right lower leg: No edema. Left lower leg: No edema. Skin:     General: Skin is warm and dry. Neurological:      General: No focal deficit present. Mental Status: He is alert and oriented to person, place, and time. Psychiatric:         Mood and Affect: Mood normal.         Behavior: Behavior normal.         DIAGNOSTIC RESULTS     EKG: All EKG's are interpreted by the Emergency Department Physician who either signs or Co-signs this chart in the absence of a cardiologist.    Third-degree heart block. Right bundle much block. LAFB. Borderline T wave changes.     RADIOLOGY:   Non-plain film images such as CT, Ultrasound and MRI are read by the radiologist. Plainradiographic images are visualized and preliminarily interpreted by the emergency physician with the below findings:        Interpretation per the Radiologist below, if available at the time of this note:    XR CHEST PORTABLE   Final Result   1. Mild cardiomegaly. There is no evidence of pulmonary vascular   congestion. Signed by Dr Lacie Mckeon            ED BEDSIDE ULTRASOUND:   Performed by ED Physician - none    LABS:  Labs Reviewed   CBC - Abnormal; Notable for the following components:       Result Value    RBC 4.42 (*)     MCV 99.5 (*)     MCH 31.9 (*)     MCHC 32.0 (*)     All other components within normal limits   COMPREHENSIVE METABOLIC PANEL W/ REFLEX TO MG FOR LOW K - Abnormal; Notable for the following components:    BUN 26 (*)     CREATININE 1.5 (*)     GFR Non- 46 (*)     GFR  55 (*)     All other components within normal limits   BRAIN NATRIURETIC PEPTIDE - Abnormal; Notable for the following components:    Pro-BNP 1,499 (*)     All other components within normal limits   COVID-19, RAPID   TROPONIN       All other labs were within normal range or not returned as of this dictation. EMERGENCY DEPARTMENT COURSE and DIFFERENTIALDIAGNOSIS/MDM:   Vitals:    Vitals:    11/07/21 1422 11/07/21 1433 11/07/21 1508   BP: (!) 129/52 106/61    Pulse: (!) 30 (!) 28    Resp: 18 13    Temp:   98.3 °F (36.8 °C)   TempSrc:   Oral   SpO2: 98% 98%    Weight:   236 lb (107 kg)       MDM  Number of Diagnoses or Management Options  Critical Care  Total time providing critical care: 30-74 minutes    Patient Progress  Patient progress: stable    1445: Message sent to Dr. Hector Downey to discuss management. 1455: Dr. Hector Downey recommended atropine and dopamine which I have ordered per his instructions. He will see in consult with plans for pacemaker. 1503: Mild KAN. Small fluid bolus ordered. Case discussed with Dr. West Deluna, ICU hospitalist, who will admit.   Patient resting comfortably and asymptomatic at this time. Heart rate is quite low but he is tolerating this well. Systolic blood pressure little over 100. Patient and family updated about plan. 1515:  Dr. West Deluna in the ER seeing patient and will admit patient to unit. CONSULTS:  IP CONSULT TO CARDIOLOGY  IP CONSULT TO CARDIOLOGY    PROCEDURES:  Unless otherwise notedbelow, none     Procedures    FINAL IMPRESSION     1. Complete heart block ([facility]    2.  KAN (acute kidney injury) ([facility]          DISPOSITION/PLAN   DISPOSITION        PATIENT REFERRED TO:  @FUP@    DISCHARGE MEDICATIONS:  New Prescriptions    No medications on file          (Please note that portions of this note were completed with a voice recognition program.  Efforts were made to edit the dictations butoccasionally words are mis-transcribed.)    Sosa Beltran MD (electronically signed)  AttendingEmergency Physician         Sosa Beltran MD  11/07/21 0370

## 2021-11-07 NOTE — PROGRESS NOTES
Lady Duran arrived to room # 148. Presented with: complete heart block   Mental Status: Patient is oriented, alert, coherent, logical and thought processes intact. Vitals:    11/07/21 1629   BP: 112/60   Pulse: (!) 29   Resp: 25   Temp:    SpO2: 96%     Patient safety contract and falls prevention contract reviewed with patient Yes. Oriented Patient and Family to room. Call light within reach. Yes.   Needs, issues or concerns expressed at this time: no.      Electronically signed by Renetta Tanner RN on 11/7/2021 at 4:30 PM

## 2021-11-07 NOTE — PROGRESS NOTES
4 Eyes Skin Assessment    Renzo Chao is being assessed upon: Admission    I agree that I, Frank Mirza, RN, along with April RN (either 2 RN's or 1 LPN and 1 RN) have performed a thorough Head to Toe Skin Assessment on the patient. ALL assessment sites listed below have been assessed. Areas assessed by both nurses:     [x]   Head, Face, and Ears   [x]   Shoulders, Back, and Chest  [x]   Arms, Elbows, and Hands   [x]   Coccyx, Sacrum, and Ischium  [x]   Legs, Feet, and Heels    Does the Patient have Skin Breakdown?  No    Won Prevention initiated: NA  Wound Care Orders initiated: NA    [facility] nurse consulted for Pressure Injury (Stage 3,4, Unstageable, DTI, NWPT, and Complex wounds) and New or Established Ostomies: NA        Primary Nurse eSignature: Frank Mirza RN on 11/7/2021 at 4:30 PM      Co-Signer eSignature: {Esignature:[id number]}

## 2021-11-08 ENCOUNTER — APPOINTMENT (OUTPATIENT)
Dept: GENERAL RADIOLOGY | Age: 71
DRG: 243 | End: 2021-11-08
Payer: MEDICARE

## 2021-11-08 LAB
ANION GAP SERPL CALCULATED.3IONS-SCNC: 11 MMOL/L (ref 7–19)
BASOPHILS ABSOLUTE: 0.1 K/UL (ref 0–0.2)
BASOPHILS RELATIVE PERCENT: 0.6 % (ref 0–1)
BUN BLDV-MCNC: 27 MG/DL (ref 8–23)
CALCIUM SERPL-MCNC: 9.3 MG/DL (ref 8.8–10.2)
CHLORIDE BLD-SCNC: 106 MMOL/L (ref 98–111)
CO2: 24 MMOL/L (ref 22–29)
CREAT SERPL-MCNC: 1.4 MG/DL (ref 0.5–1.2)
EKG P AXIS: NORMAL DEGREES
EKG P-R INTERVAL: NORMAL MS
EKG Q-T INTERVAL: 398 MS
EKG QRS DURATION: 154 MS
EKG QTC CALCULATION (BAZETT): 450 MS
EKG T AXIS: 87 DEGREES
EOSINOPHILS ABSOLUTE: 0.2 K/UL (ref 0–0.6)
EOSINOPHILS RELATIVE PERCENT: 1.7 % (ref 0–5)
GFR AFRICAN AMERICAN: >59
GFR NON-AFRICAN AMERICAN: 50
GLUCOSE BLD-MCNC: 132 MG/DL (ref 74–109)
HCT VFR BLD CALC: 42.9 % (ref 42–52)
HEMOGLOBIN: 14.6 G/DL (ref 14–18)
IMMATURE GRANULOCYTES #: 0.1 K/UL
LYMPHOCYTES ABSOLUTE: 1.3 K/UL (ref 1.1–4.5)
LYMPHOCYTES RELATIVE PERCENT: 11.8 % (ref 20–40)
MCH RBC QN AUTO: 33 PG (ref 27–31)
MCHC RBC AUTO-ENTMCNC: 34 G/DL (ref 33–37)
MCV RBC AUTO: 97.1 FL (ref 80–94)
MONOCYTES ABSOLUTE: 1.1 K/UL (ref 0–0.9)
MONOCYTES RELATIVE PERCENT: 10.2 % (ref 0–10)
NEUTROPHILS ABSOLUTE: 8.3 K/UL (ref 1.5–7.5)
NEUTROPHILS RELATIVE PERCENT: 75.2 % (ref 50–65)
PDW BLD-RTO: 13.1 % (ref 11.5–14.5)
PLATELET # BLD: 171 K/UL (ref 130–400)
PMV BLD AUTO: 9.4 FL (ref 9.4–12.4)
POTASSIUM REFLEX MAGNESIUM: 4.1 MMOL/L (ref 3.5–5)
RBC # BLD: 4.42 M/UL (ref 4.7–6.1)
SODIUM BLD-SCNC: 141 MMOL/L (ref 136–145)
WBC # BLD: 11.1 K/UL (ref 4.8–10.8)

## 2021-11-08 PROCEDURE — 36415 COLL VENOUS BLD VENIPUNCTURE: CPT

## 2021-11-08 PROCEDURE — 2000000000 HC ICU R&B

## 2021-11-08 PROCEDURE — 4A023N7 MEASUREMENT OF CARDIAC SAMPLING AND PRESSURE, LEFT HEART, PERCUTANEOUS APPROACH: ICD-10-PCS | Performed by: INTERNAL MEDICINE

## 2021-11-08 PROCEDURE — 6360000002 HC RX W HCPCS: Performed by: INTERNAL MEDICINE

## 2021-11-08 PROCEDURE — 80048 BASIC METABOLIC PNL TOTAL CA: CPT

## 2021-11-08 PROCEDURE — 93010 ELECTROCARDIOGRAM REPORT: CPT | Performed by: INTERNAL MEDICINE

## 2021-11-08 PROCEDURE — 2580000003 HC RX 258: Performed by: INTERNAL MEDICINE

## 2021-11-08 PROCEDURE — 93458 L HRT ARTERY/VENTRICLE ANGIO: CPT | Performed by: INTERNAL MEDICINE

## 2021-11-08 PROCEDURE — C1894 INTRO/SHEATH, NON-LASER: HCPCS

## 2021-11-08 PROCEDURE — C1898 LEAD, PMKR, OTHER THAN TRANS: HCPCS

## 2021-11-08 PROCEDURE — 93458 L HRT ARTERY/VENTRICLE ANGIO: CPT

## 2021-11-08 PROCEDURE — 33208 INSRT HEART PM ATRIAL & VENT: CPT

## 2021-11-08 PROCEDURE — 92928 PRQ TCAT PLMT NTRAC ST 1 LES: CPT | Performed by: INTERNAL MEDICINE

## 2021-11-08 PROCEDURE — B2151ZZ FLUOROSCOPY OF LEFT HEART USING LOW OSMOLAR CONTRAST: ICD-10-PCS | Performed by: INTERNAL MEDICINE

## 2021-11-08 PROCEDURE — 99152 MOD SED SAME PHYS/QHP 5/>YRS: CPT | Performed by: INTERNAL MEDICINE

## 2021-11-08 PROCEDURE — 6360000004 HC RX CONTRAST MEDICATION: Performed by: INTERNAL MEDICINE

## 2021-11-08 PROCEDURE — 0JH606Z INSERTION OF PACEMAKER, DUAL CHAMBER INTO CHEST SUBCUTANEOUS TISSUE AND FASCIA, OPEN APPROACH: ICD-10-PCS | Performed by: INTERNAL MEDICINE

## 2021-11-08 PROCEDURE — C1769 GUIDE WIRE: HCPCS

## 2021-11-08 PROCEDURE — C1725 CATH, TRANSLUMIN NON-LASER: HCPCS

## 2021-11-08 PROCEDURE — 027034Z DILATION OF CORONARY ARTERY, ONE ARTERY WITH DRUG-ELUTING INTRALUMINAL DEVICE, PERCUTANEOUS APPROACH: ICD-10-PCS | Performed by: INTERNAL MEDICINE

## 2021-11-08 PROCEDURE — 2500000003 HC RX 250 WO HCPCS

## 2021-11-08 PROCEDURE — 2709999900 HC NON-CHARGEABLE SUPPLY

## 2021-11-08 PROCEDURE — B2111ZZ FLUOROSCOPY OF MULTIPLE CORONARY ARTERIES USING LOW OSMOLAR CONTRAST: ICD-10-PCS | Performed by: INTERNAL MEDICINE

## 2021-11-08 PROCEDURE — 02H63JZ INSERTION OF PACEMAKER LEAD INTO RIGHT ATRIUM, PERCUTANEOUS APPROACH: ICD-10-PCS | Performed by: INTERNAL MEDICINE

## 2021-11-08 PROCEDURE — 02HK3JZ INSERTION OF PACEMAKER LEAD INTO RIGHT VENTRICLE, PERCUTANEOUS APPROACH: ICD-10-PCS | Performed by: INTERNAL MEDICINE

## 2021-11-08 PROCEDURE — 99152 MOD SED SAME PHYS/QHP 5/>YRS: CPT

## 2021-11-08 PROCEDURE — 93005 ELECTROCARDIOGRAM TRACING: CPT | Performed by: EMERGENCY MEDICINE

## 2021-11-08 PROCEDURE — C1887 CATHETER, GUIDING: HCPCS

## 2021-11-08 PROCEDURE — 6370000000 HC RX 637 (ALT 250 FOR IP)

## 2021-11-08 PROCEDURE — 33208 INSRT HEART PM ATRIAL & VENT: CPT | Performed by: INTERNAL MEDICINE

## 2021-11-08 PROCEDURE — 71045 X-RAY EXAM CHEST 1 VIEW: CPT

## 2021-11-08 PROCEDURE — C1874 STENT, COATED/COV W/DEL SYS: HCPCS

## 2021-11-08 PROCEDURE — 2780000010 HC IMPLANT OTHER

## 2021-11-08 PROCEDURE — 99153 MOD SED SAME PHYS/QHP EA: CPT

## 2021-11-08 PROCEDURE — C1785 PMKR, DUAL, RATE-RESP: HCPCS

## 2021-11-08 PROCEDURE — 6360000002 HC RX W HCPCS

## 2021-11-08 PROCEDURE — 2700000000 HC OXYGEN THERAPY PER DAY

## 2021-11-08 PROCEDURE — 85025 COMPLETE CBC W/AUTO DIFF WBC: CPT

## 2021-11-08 PROCEDURE — 93005 ELECTROCARDIOGRAM TRACING: CPT | Performed by: INTERNAL MEDICINE

## 2021-11-08 PROCEDURE — 92928 PRQ TCAT PLMT NTRAC ST 1 LES: CPT

## 2021-11-08 PROCEDURE — 6370000000 HC RX 637 (ALT 250 FOR IP): Performed by: INTERNAL MEDICINE

## 2021-11-08 RX ORDER — CLOPIDOGREL BISULFATE 75 MG/1
75 TABLET ORAL DAILY
Status: DISCONTINUED | OUTPATIENT
Start: 2021-11-09 | End: 2021-11-09 | Stop reason: HOSPADM

## 2021-11-08 RX ORDER — SODIUM CHLORIDE 9 MG/ML
INJECTION, SOLUTION INTRAVENOUS CONTINUOUS
Status: DISCONTINUED | OUTPATIENT
Start: 2021-11-08 | End: 2021-11-09 | Stop reason: HOSPADM

## 2021-11-08 RX ORDER — NITROGLYCERIN 0.4 MG/1
0.4 TABLET SUBLINGUAL EVERY 5 MIN PRN
Status: DISCONTINUED | OUTPATIENT
Start: 2021-11-08 | End: 2021-11-09 | Stop reason: HOSPADM

## 2021-11-08 RX ADMIN — DOPAMINE HYDROCHLORIDE 15 MCG/KG/MIN: 160 INJECTION, SOLUTION INTRAVENOUS at 11:05

## 2021-11-08 RX ADMIN — SODIUM CHLORIDE, PRESERVATIVE FREE 10 ML: 5 INJECTION INTRAVENOUS at 20:14

## 2021-11-08 RX ADMIN — SODIUM CHLORIDE: 9 INJECTION, SOLUTION INTRAVENOUS at 16:20

## 2021-11-08 RX ADMIN — IOPAMIDOL 170 ML: 612 INJECTION, SOLUTION INTRAVENOUS at 15:22

## 2021-11-08 RX ADMIN — ENOXAPARIN SODIUM 40 MG: 40 INJECTION SUBCUTANEOUS at 16:49

## 2021-11-08 RX ADMIN — METOCLOPRAMIDE HYDROCHLORIDE 10 MG: 5 INJECTION INTRAMUSCULAR; INTRAVENOUS at 23:48

## 2021-11-08 RX ADMIN — SODIUM CHLORIDE, PRESERVATIVE FREE 10 ML: 5 INJECTION INTRAVENOUS at 09:23

## 2021-11-08 RX ADMIN — METOCLOPRAMIDE HYDROCHLORIDE 10 MG: 5 INJECTION INTRAMUSCULAR; INTRAVENOUS at 05:23

## 2021-11-08 RX ADMIN — METOCLOPRAMIDE HYDROCHLORIDE 10 MG: 5 INJECTION INTRAMUSCULAR; INTRAVENOUS at 16:49

## 2021-11-08 RX ADMIN — ATORVASTATIN CALCIUM 40 MG: 40 TABLET, FILM COATED ORAL at 07:46

## 2021-11-08 RX ADMIN — PERFLUTREN 2.2 MG: 6.52 INJECTION, SUSPENSION INTRAVENOUS at 15:42

## 2021-11-08 RX ADMIN — ASPIRIN 81 MG: 81 TABLET, CHEWABLE ORAL at 07:46

## 2021-11-08 RX ADMIN — PENTOXIFYLLINE 400 MG: 400 TABLET, EXTENDED RELEASE ORAL at 16:49

## 2021-11-08 RX ADMIN — VANCOMYCIN HYDROCHLORIDE 1000 MG: 10 INJECTION, POWDER, LYOPHILIZED, FOR SOLUTION INTRAVENOUS at 21:45

## 2021-11-08 RX ADMIN — PENTOXIFYLLINE 400 MG: 400 TABLET, EXTENDED RELEASE ORAL at 07:46

## 2021-11-08 RX ADMIN — SACUBITRIL AND VALSARTAN 1 TABLET: 24; 26 TABLET, FILM COATED ORAL at 20:12

## 2021-11-08 RX ADMIN — SACUBITRIL AND VALSARTAN 1 TABLET: 24; 26 TABLET, FILM COATED ORAL at 07:46

## 2021-11-08 RX ADMIN — METOCLOPRAMIDE HYDROCHLORIDE 10 MG: 5 INJECTION INTRAMUSCULAR; INTRAVENOUS at 11:06

## 2021-11-08 RX ADMIN — METOCLOPRAMIDE HYDROCHLORIDE 10 MG: 5 INJECTION INTRAMUSCULAR; INTRAVENOUS at 00:05

## 2021-11-08 RX ADMIN — DOPAMINE HYDROCHLORIDE 15 MCG/KG/MIN: 160 INJECTION, SOLUTION INTRAVENOUS at 05:23

## 2021-11-08 ASSESSMENT — ENCOUNTER SYMPTOMS
BACK PAIN: 0
VOICE CHANGE: 0
VOMITING: 0
CONSTIPATION: 0
DIARRHEA: 0
NAUSEA: 0
SHORTNESS OF BREATH: 0
COLOR CHANGE: 0

## 2021-11-08 ASSESSMENT — PAIN SCALES - GENERAL
PAINLEVEL_OUTOF10: 0

## 2021-11-08 NOTE — PROGRESS NOTES
Hospitalist Progress Note    Patient:  Manuela Coleman  YOB: 1950  Date of Service: 11/8/2021  MRN: [id number]   Acct: [de-identified]   Primary Care Physician: Madhavi Wheeler MD  Advance Directive: Full Code  Admit Date: 11/7/2021       Hospital Day: 1  Referring Provider: Rito Perez,     Patient Seen, Chart, Consults, Notes, Labs, Radiology studies reviewed. Subjective:  Manuela Coleman is a 66 y.o. male  whom we are following for third-degree AV block. He did well overnight with the dopamine infusion. He is scheduled for left heart catheterization and possible pacemaker plus minus defibrillator depending on his ejection fraction. He otherwise feels well. Blood pressure is stable. Heart rate is around 40. Creatinine is 1.4 mg percent.     Allergies:  Penicillins    Medicines:  Current Facility-Administered Medications   Medication Dose Route Frequency Provider Last Rate Last Admin    DOPamine (INTROPIN) 400 mg in dextrose 5 % 250 mL infusion  2-20 mcg/kg/min IntraVENous Continuous Glorianne Delay, DO 60.2 mL/hr at 11/08/21 0523 15 mcg/kg/min at 11/08/21 0523    sacubitril-valsartan (ENTRESTO) 24-26 MG per tablet 1 tablet  1 tablet Oral BID Glorianne Delay, DO   1 tablet at 11/08/21 9404    pentoxifylline (TRENTAL) extended release tablet 400 mg  400 mg Oral BID WC Glorianne Delay, DO   400 mg at 11/08/21 0746    atorvastatin (LIPITOR) tablet 40 mg  40 mg Oral Daily Glorianne Delay, DO   40 mg at 11/08/21 0746    aspirin chewable tablet 81 mg  81 mg Oral Daily Glorianne Delay, DO   81 mg at 11/08/21 0746    sodium chloride flush 0.9 % injection 10 mL  10 mL IntraVENous 2 times per day Glorianne Delay, DO   10 mL at 11/08/21 9048    sodium chloride flush 0.9 % injection 10 mL  10 mL IntraVENous PRN Glorianne Delay, DO        0.9 % sodium chloride infusion  25 mL IntraVENous PRN Glorianne Delay, DO        enoxaparin (LOVENOX) injection 40 mg  40 mg SubCUTAneous Q24H Suresh Lewis DO        ondansetron (ZOFRAN-ODT) disintegrating tablet 4 mg  4 mg Oral Q8H PRN Suresh Lewis DO        Or    ondansetron [facility]) injection 4 mg  4 mg IntraVENous Q6H PRN Suresh Lewis, DO   4 mg at 11/07/21 1655    magnesium hydroxide (MILK OF MAGNESIA) 400 MG/5ML suspension 30 mL  30 mL Oral Daily PRN Suresh Lewis DO        acetaminophen (TYLENOL) tablet 650 mg  650 mg Oral Q6H PRN Suresh Lewis DO        Or    acetaminophen (TYLENOL) suppository 650 mg  650 mg Rectal Q6H PRN Suresh Lewis DO        influenza quadrivalent subunit vaccine (FLUCELVAX) injection 0.5 mL  0.5 mL IntraMUSCular Prior to discharge Suresh Lewis DO        metoclopramide (REGLAN) injection 10 mg  10 mg IntraVENous Q6H Suresh Lewis, DO   10 mg at 11/08/21 3699       Past Medical History:  Past Medical History:   Diagnosis Date    [facility] (ab[facility]) ([facility]     CAD (coronary artery disease)     CARDIAC STENTS X 4    [facility]est[facility]) ([facility]     Gout     History of acute congestive heart failure     Hyperlipidemia     Hypertension     Neuropathy involving both lower extremities        Past Surgical History:  Past Surgical History:   Procedure Laterality Date    CARDIAC CATHETERIZATION  05/2019    HERNIA REPAIR      TOE AMPUTATION Right 4/15/2021    RIGHT THIRD TOE AMPUTATION performed by Angelica Zurita DO at [facility] OR       Family History  Family History   Problem Relation Age of Onset    Heart Surgery Brother     Heart Disease Brother        Social History  Social History     Socioeconomic History    Marital status: Legally      Spouse name: Not on file    Number of children: Not on file    Years of education: Not on file    Highest education level: Not on file   Occupational History    Not on file   Tobacco Use    Smoking status: Former Smoker     Packs/day: 1.00     Types: Cigarettes     Quit date: 1980     Years since quittin.4    Smokeless tobacco: Never Used    Tobacco comment: quit over 25 yrs ago   Vaping Use    Vaping Use: Never used   Substance and Sexual Activity    Alcohol use: No    Drug use: No    Sexual activity: Not on file   Other Topics Concern    Not on file   Social History Narrative    Not on file     Social Determinants of Health     Financial Resource Strain:     Difficulty of Paying Living Expenses: Not on file   Food Insecurity:     Worried About Running Out of Food in the Last Year: Not on file    Heaven of Food in the Last Year: Not on file   Transportation Needs:     Lack of Transportation (Medical): Not on file    Lack of Transportation (Non-Medical): Not on file   Physical Activity:     Days of Exercise per Week: Not on file    Minutes of Exercise per Session: Not on file   Stress:     Feeling of Stress : Not on file   Social Connections:     Frequency of Communication with Friends and Family: Not on file    Frequency of Social Gatherings with Friends and Family: Not on file    Attends [employer] Services: Not on file    Active Member of [address] [place] [address] or Organizations: Not on file    Attends Club or Organization Meetings: Not on file    Marital Status: Not on file   Intimate Partner Violence:     Fear of Current or Ex-Partner: Not on file    Emotionally Abused: Not on file    Physically Abused: Not on file    Sexually Abused: Not on file   Housing Stability:     Unable to Pay for Housing in the Last Year: Not on file    Number of [place] in the Last Year: Not on file    Unstable Housing in the Last Year: Not on file         Review of Systems:    Review of Systems   Constitutional: Negative for activity change and fever. HENT: Negative for congestion and voice change. Eyes: Negative for visual disturbance. Respiratory: Negative for shortness of breath. Cardiovascular: Negative for chest pain and leg swelling.    Gastrointestinal: Negative for constipation, diarrhea, nausea and vomiting. Endocrine: Negative for polyuria. Genitourinary: Negative for difficulty urinating and dysuria. Musculoskeletal: Negative for back pain and neck pain. Skin: Negative for color change. Allergic/Immunologic: Negative for immunocompromised state. Neurological: Negative for dizziness and light-headedness. Psychiatric/Behavioral: The patient is not nervous/anxious. Objective:  Blood pressure 126/65, pulse (!) 36, temperature 96.7 °F (35.9 °C), temperature source Temporal, resp. rate 15, height 6' 3\" (1.905 m), weight 251 lb 3.2 oz (113.9 kg), SpO2 97 %. Intake/Output Summary (Last 24 hours) at 11/8/2021 1050  Last data filed at 11/8/2021 1000  Gross per 24 hour   Intake 1406.69 ml   Output 700 ml   Net 706.69 ml       Physical Exam  Vitals and nursing note reviewed. HENT:      Head: Normocephalic and atraumatic. Right Ear: External ear normal.      Left Ear: External ear normal.      Nose: Nose normal.      Mouth/Throat:      Mouth: Mucous membranes are moist.   Eyes:      Conjunctiva/sclera: Conjunctivae normal.      Pupils: Pupils are equal, round, and reactive to light. Cardiovascular:      Rate and Rhythm: Normal rate and regular rhythm. Heart sounds: Normal heart sounds. Pulmonary:      Effort: Pulmonary effort is normal.      Breath sounds: Normal breath sounds. Abdominal:      General: Abdomen is flat. Palpations: Abdomen is soft. Musculoskeletal:         General: No swelling. Cervical back: Neck supple. No rigidity. Skin:     General: Skin is warm and dry. Neurological:      Mental Status: He is oriented to person, place, and time. Psychiatric:         Mood and Affect: Mood normal.         Thought Content:  Thought content normal.         Labs:  BMP:   Recent Labs     11/07/21  1429 11/07/21  1743 11/08/21  0334     --  141   K 5.0 5.2* 4.1     --  106   CO2 23  --  24   BUN 26*  -- 27*   CREATININE 1.5*  --  1.4*   CALCIUM 9.9  --  9.3     CBC:   Recent Labs     11/07/21  1429 11/08/21  0334   WBC 10.4 11.1*   HGB 14.1 14.6   HCT 44.0 42.9   MCV 99.5* 97.1*    171     LIVER PROFILE:   Recent Labs     11/07/21  1429   AST 20   ALT 28   BILITOT 0.6   ALKPHOS 81     PT/INR:   Recent Labs     11/07/21  1743   PROTIME 13.2   INR 0.98     APTT:   Recent Labs     11/07/21  1743   APTT 26.6     BNP:  No results for input(s): BNP in the last 72 hours. Ionized Calcium:No results for input(s): IONCA in the last 72 hours. Magnesium:  Recent Labs     11/07/21  1743   MG 2.4     Phosphorus:No results for input(s): PHOS in the last 72 hours. HgbA1C: No results for input(s): LABA1C in the last 72 hours. Hepatic:   Recent Labs     11/07/21  1429   ALKPHOS 81   ALT 28   AST 20   PROT 7.0   BILITOT 0.6   LABALBU 4.3     Lactic Acid:   Recent Labs     11/07/21  1906   LACTA 1.2     Troponin:   Recent Labs     11/07/21  1743   CKTOTAL 106     ABGs: No results for input(s): PH, PCO2, PO2, HCO3, O2SAT in the last 72 hours. CRP:  No results for input(s): CRP in the last 72 hours. Sed Rate:  No results for input(s): SEDRATE in the last 72 hours. Cultures:   No results for input(s): CULTURE in the last 72 hours. No results for input(s): BC, Madeline Idler in the last 72 hours. No results for input(s): CXSURG in the last 72 hours. Radiology reports as per the Radiologist  Radiology: XR CHEST PORTABLE    Result Date: 11/7/2021  EXAMINATION: XR CHEST PORTABLE 11/7/2021 3:55 PM HISTORY: XR CHEST PORTABLE 11/7/2021 2:54 PM HISTORY: Chest pain COMPARISON: None. FINDINGS: The lungs are clear. Cardiac silhouettes mildly enlarged. . The osseous structures and surrounding soft tissues demonstrate no acute abnormality. 1. Mild cardiomegaly. There is no evidence of pulmonary vascular congestion. Signed by Dr Trev Charles block AV third degree. Dopamine infusion.   Left heart catheterization today with pacemaker plus minus defibrillator.     Please document 39 minutes of critical care time for patient assessment, chart review, discussion with staff, .       Taylor Medina, DO

## 2021-11-08 NOTE — CONSULTS
artery disease with ischemic cardiomyopathy, prior PCI post MI to proximal circumflex, proximal mid and distal LAD with DESx4, ejection fraction improving to 40 to 45%. 3.  Peripheral arterial disease with right third toe amputation for nonhealing wound 4/15/2021. 4.  Prior tobacco use stopped 1980. PRESENTATION: Juan Wen is a 66y.o. year old male who presents with sudden onset of shortness of breath with lightheadedness while helping his son in the ER today. [place] lightheaded. Symptoms occurred with any exertion. Felt okay at rest.  No chest pain. In the last 2 to 3 weeks he has had momentary chest discomfort which lasts a few seconds but no prolonged episodes lasting minutes. No clear relation to exertion. No history of tick bites. No medication changes. EKG in the ER shows him to be in complete heart block with heart rate at 30 bpm with junctional escape. Patient initiated on dopamine with improvement in heart rates between 40 to 60 bpm.  Asymptomatic at rest with high-grade AV block noted. Blood pressures have been normal at 130/90 mmHg. Patient was only on Coreg 3.125 mg twice daily. Was intolerant of higher doses in the past.  On Entresto 24/26 mg twice daily. REVIEW OF SYSTEMS:  Review of Systems   Constitutional: Negative for activity change, diaphoresis and fatigue. HENT: Negative for hearing loss, nosebleeds and tinnitus. Eyes: Negative for visual disturbance. Respiratory: Positive for shortness of breath. Negative for cough and wheezing. Cardiovascular: Negative for chest pain, palpitations and leg swelling. Gastrointestinal: Negative for abdominal distention, abdominal pain, blood in stool, diarrhea and vomiting. Endocrine: Negative for cold intolerance, heat intolerance, polydipsia, polyphagia and polyuria. Genitourinary: Negative for difficulty urinating, flank pain and hematuria. Musculoskeletal: Negative for arthralgias, back pain, joint swelling and myalgias. Skin: Negative for pallor and rash. Neurological: Positive for light-headedness. Negative for dizziness, seizures, syncope and headaches. Psychiatric/Behavioral: Negative for behavioral problems and dysphoric mood. The patient is not nervous/anxious. Past Medical History:      Diagnosis Date    AAA (abdominal aortic aneurysm) (HCC)     CAD (coronary artery disease)     CARDIAC STENTS X 4    CHF (congestive heart failure) (HCC)     Gout     History of acute congestive heart failure     Hyperlipidemia     Hypertension     Neuropathy involving both lower extremities        Past Surgical History:      Procedure Laterality Date    CARDIAC CATHETERIZATION  2019    HERNIA REPAIR      TOE AMPUTATION Right 4/15/2021    RIGHT THIRD TOE AMPUTATION performed by Consuelo Pearson DO at [facility] OR       Allergies:  Penicillins    Past Social History:  Social History     Socioeconomic History    Marital status: Legally      Spouse name: Not on file    Number of children: Not on file    Years of education: Not on file    Highest education level: Not on file   Occupational History    Not on file   Tobacco Use    Smoking status: Former Smoker     Packs/day: 1.00     Types: Cigarettes     Quit date: 1980     Years since quittin.4    Smokeless tobacco: Never Used    Tobacco comment: quit over 25 yrs ago   Vaping Use    Vaping Use: Never used   Substance and Sexual Activity    Alcohol use: No    Drug use: No    Sexual activity: Not on file   Other Topics Concern    Not on file   Social History Narrative    Not on file     Social Determinants of Health     Financial Resource Strain:     Difficulty of Paying Living Expenses: Not on file   Food Insecurity:     Worried About Running Out of Food in the Last Year: Not on file    Heaven of Food in the Last Year: Not on file   Transportation Needs:     Lack of Transportation (Medical):  Not on file    Lack of Transportation (Non-Medical): Not on file   Physical Activity:     Days of Exercise per Week: Not on file    Minutes of Exercise per Session: Not on file   Stress:     Feeling of Stress : Not on file   Social Connections:     Frequency of Communication with Friends and Family: Not on file    Frequency of Social Gatherings with Friends and Family: Not on file    Attends Congregational Services: Not on file    Active Member of [address] [place] [address] or Organizations: Not on file    Attends Club or Organization Meetings: Not on file    Marital Status: Not on file   Intimate Partner Violence:     Fear of Current or Ex-Partner: Not on file    Emotionally Abused: Not on file    Physically Abused: Not on file    Sexually Abused: Not on file   Housing Stability:     Unable to Pay for Housing in the Last Year: Not on file    Number of [place] in the Last Year: Not on file    Unstable Housing in the Last Year: Not on file       Family History:       Problem Relation Age of Onset    Heart Surgery Brother     Heart Disease Brother        Home Meds:  Prior to Admission medications    Medication Sig Start Date End Date Taking? Authorizing Provider   Multiple Vitamins-Minerals (MULTIVITAMIN ADULT, MINERALS,) TABS Take 1 tablet by mouth daily 10/18/21   Dixie Sparrow MD   aspirin 81 MG chewable tablet TAKE 1 TABLET BY MOUTH EVERY DAY 10/14/21   CHRIS Dykes   furosemide (LASIX) 20 MG tablet Take 1 tablet by mouth daily 10/14/21   CHRIS Dykes   nitroGLYCERIN (NITROSTAT) 0.4 MG SL tablet up to max of 3 total doses. If no relief after 1 dose, call 911. 9/22/21   Dixie Sparrow MD   atorvastatin (LIPITOR) 40 MG tablet Take 1 tablet by mouth daily at night. 9/9/21   Dixie Sparrow MD   sildenafil (VIAGRA) 100 MG tablet TAKE 1 TABLET BY MOUTH AS NEEDED FOR ERECTILE DYSFUNCTION 7/29/21   Dixie Sparrow MD   pentoxifylline (TRENTAL) 400 MG extended release tablet Take 1 tablet by mouth 2 times daily (with meals) Do not crush or break. 7/21/21   Komal Caecres MD   ENTRESTO 24-26 MG per tablet TAKE 1 TABLET BY MOUTH TWICE A DAY 1/13/21   Cathay Bumpers, APRN   carvedilol (COREG) 3.125 MG tablet Take 1 tablet by mouth 2 times daily 12/22/20   CHRIS Velazquez   Omega-3 Fatty Acids (FISH OIL PO) Take by mouth daily    Historical Provider, MD   Multiple Vitamins-Minerals (ONE DAILY MULTIVITAMIN MEN PO) Take by mouth daily    Historical Provider, MD       Current Meds:   sacubitril-valsartan  1 tablet Oral BID    pentoxifylline  400 mg Oral BID WC    atorvastatin  40 mg Oral Daily    aspirin  81 mg Oral Daily    sodium chloride flush  10 mL IntraVENous 2 times per day    enoxaparin  40 mg SubCUTAneous Q24H    influenza virus vaccine  0.5 mL IntraMUSCular Prior to discharge    metoclopramide  10 mg IntraVENous Q6H       Current Infused Meds:   DOPamine 5 mcg/kg/min (11/07/21 1649)    sodium chloride         Physical Exam:  Vitals:    11/07/21 1730   BP: (!) 148/52   Pulse: (!) 43   Resp: 25   Temp:    SpO2: (!) 89%       Intake/Output Summary (Last 24 hours) at 11/7/2021 1833  Last data filed at 11/7/2021 1600  Gross per 24 hour   Intake 0 ml   Output --   Net 0 ml     Estimated body mass index is 31.11 kg/m² as calculated from the following:    Height as of this encounter: 6' 3\" (1.905 m). Weight as of this encounter: 248 lb 14.4 oz (112.9 kg). Physical Exam  HENT:      Mouth/Throat:      Pharynx: No oropharyngeal exudate. Eyes:      General: No scleral icterus. Right eye: No discharge. Left eye: No discharge. Neck:      Thyroid: No thyromegaly. Vascular: No JVD. Cardiovascular:      Rate and Rhythm: Bradycardia present. Rhythm irregular. Heart sounds: No murmur heard. No friction rub. No gallop. Comments: No JVD  No edema  No systolic or diastolic murmurs noted  Pulmonary:      Effort: No respiratory distress. Breath sounds: No stridor. No wheezing or rales.    Abdominal:      General: Bowel sounds are normal. There is no distension. Palpations: Abdomen is soft. There is no mass. Tenderness: There is no abdominal tenderness. There is no guarding or rebound. Comments: No palpable organomegaly   Musculoskeletal:         General: No deformity. Skin:     General: Skin is warm. Coloration: Skin is not pale. Findings: No erythema or rash. Neurological:      Mental Status: He is alert and oriented to person, place, and time. Motor: No abnormal muscle tone. Coordination: Coordination normal.      Deep Tendon Reflexes: Reflexes normal.           Labs:  Recent Labs     11/07/21  1429   WBC 10.4   HGB 14.1          Recent Labs     11/07/21  1429 11/07/21  1743     --    K 5.0 5.2*     --    CO2 23  --    BUN 26*  --    CREATININE 1.5*  --    LABGLOM 46*  --    MG  --  2.4   CALCIUM 9.9  --        CK, CKMB, Troponin: @LABRCNT (CKTOTAL:3, CKMB:3, TROPONINI:3)@    Last 3 BNP:          IMAGING:  XR CHEST PORTABLE    Result Date: 11/7/2021  EXAMINATION: XR CHEST PORTABLE 11/7/2021 3:55 PM HISTORY: XR CHEST PORTABLE 11/7/2021 2:54 PM HISTORY: Chest pain COMPARISON: None. FINDINGS: The lungs are clear. Cardiac silhouettes mildly enlarged. . The osseous structures and surrounding soft tissues demonstrate no acute abnormality. 1. Mild cardiomegaly. There is no evidence of pulmonary vascular congestion. Signed by Dr Chelsie Diop and Plan: This is a 66y.o. year old male with past medical history of coronary artery disease with ischemic cardiomyopathy, ejection fraction improving to 40-45% with prior PCI to thrombotic proximal circumflex and proximal mid and distal LAD after an MI presentation 5/19/2019, peripheral arterial disease with right third toe amputation 4/2021 presenting with new symptomatic severe complete heart block with junctional escape. 1.  No clear ischemic triggers noted with negative troponin.   No history of tick

## 2021-11-08 NOTE — CONSULTS
**Physician Signature**  This document was electronically signed by: Radha Donaldson DO  2021   10:33 PM    **Consult Information**  Member Facility: [address] [place] [address] Drive MRN: [id number]  Visit/Encounter Number: [id number]  Consult ID: [id number]  Facility Time Zone: CT  Date and Time of Request: 2021 09:49 PM  CT  Requesting Clinician: Griselda Kruger DO  Patient Name: Roxie Crowell  Date of Birth:   Gender: Male  Patient identity was confirmed at the beginning of the consult with the   patient/family/staff using two personal identifiers: Patient name and       **Reason for Consult**  Reason for Consult: [facility]    **Admission**  Admission Date: 2021  Chief reason for ICU admission: bradycardia    **Core Metrics**  General orienting sentence for patient: 65 y/o with hx CAD, stents x 4, CHF,   HLD, HTN, PVD admitted for bradycardia, HB. On dopamine gtt at   20mcg.   Chief physiologic deterioration: bradycardia  Is the patient on DVT prophylaxis?: Yes  Prophylaxis type: Pharmacological  DVT Prophylaxis Comments: LMWH, ASA  Is the patient on GI prophylaxis?: Not Indicated  Has this patient reached their nutritional goal?: No and issues are being   addressed  Nutritional Goals Details: cath in am.  Are there current issues with pain management in this patient?:   No  Are there issues with skin integrity?: No  Are there issues with delirium?: No  Has the patient been mobilized?: No  Is this patient currently intubated?: No  Are there ethical or care philosophy or family issues?: No  Do you recommend an in depth evaluation?: No  Disposition Recommendations: Continue ICU level of care    **Physician Signature**  This document was electronically signed by: Radha Donaldson DO  2021   10:33 PM

## 2021-11-08 NOTE — PLAN OF CARE
Problem: Discharge Planning:  Goal: Participates in care planning  Description: Participates in care planning  Outcome: Ongoing  Goal: Discharged to appropriate level of care  Description: Discharged to appropriate level of care  Outcome: Ongoing     Problem: Airway Clearance - Ineffective:  Goal: Ability to maintain a clear airway will improve  Description: Ability to maintain a clear airway will improve  Outcome: Ongoing     Problem: Anxiety/Stress:  Goal: Level of anxiety will decrease  Description: Level of anxiety will decrease  Outcome: Ongoing     Problem: Aspiration:  Goal: Absence of aspiration  Description: Absence of aspiration  Outcome: Ongoing     Problem:  Bowel Function - Altered:  Goal: Bowel elimination is within specified parameters  Description: Bowel elimination is within specified parameters  Outcome: Ongoing     Problem: Cardiac Output - Decreased:  Goal: Hemodynamic stability will improve  Description: Hemodynamic stability will improve  Outcome: Ongoing     Problem: Fluid Volume - Imbalance:  Goal: Absence of imbalanced fluid volume signs and symptoms  Description: Absence of imbalanced fluid volume signs and symptoms  Outcome: Ongoing     Problem: Gas Exchange - Impaired:  Goal: Levels of oxygenation will improve  Description: Levels of oxygenation will improve  Outcome: Ongoing     Problem: Mental Status - Impaired:  Goal: Mental status will be restored to baseline  Description: Mental status will be restored to baseline  Outcome: Ongoing     Problem: Nutrition Deficit:  Goal: Ability to achieve adequate nutritional intake will improve  Description: Ability to achieve adequate nutritional intake will improve  Outcome: Ongoing     Problem: Pain:  Goal: Pain level will decrease  Description: Pain level will decrease  Outcome: Ongoing  Goal: Recognizes and communicates pain  Description: Recognizes and communicates pain  Outcome: Ongoing  Goal: Control of acute pain  Description: Control of acute pain  Outcome: Ongoing  Goal: Control of chronic pain  Description: Control of chronic pain  Outcome: Ongoing     Problem: Serum Glucose Level - Abnormal:  Goal: Ability to maintain appropriate glucose levels will improve to within specified parameters  Description: Ability to maintain appropriate glucose levels will improve to within specified parameters  Outcome: Ongoing     Problem: Skin Integrity - Impaired:  Goal: Will show no infection signs and symptoms  Description: Will show no infection signs and symptoms  Outcome: Ongoing  Goal: Absence of new skin breakdown  Description: Absence of new skin breakdown  Outcome: Ongoing     Problem: Sleep Pattern Disturbance:  Goal: Appears well-rested  Description: Appears well-rested  Outcome: Ongoing     Problem: Tissue Perfusion, Altered:  Goal: Circulatory function within specified parameters  Description: Circulatory function within specified parameters  Outcome: Ongoing     Problem: Tissue Perfusion - Cardiopulmonary, Altered:  Goal: Absence of angina  Description: Absence of angina  Outcome: Ongoing  Goal: Hemodynamic stability will improve  Description: Hemodynamic stability will improve  Outcome: Ongoing     Problem: Cardiac Output - Decreased:  Goal: Hemodynamic stability will improve  Description: Hemodynamic stability will improve  Outcome: Ongoing

## 2021-11-08 NOTE — OP NOTE
Operative Note      Patient: Valerie Tse  YOB: 1950  MRN: [id number]    Date of Procedure: 11/8/2021    Pre-Op Diagnosis: Complete heart block    Post-Op Diagnosis: Same       Procedure: Dual-chamber pacemaker placement    : ONOFRE Mullen    Anesthesia: Moderate conscious sedation  Anesthesia: Lidocaine LA  Sedation: Versed 1 mg; Fentanyl  0mg  Start time: 1:53 PM  Stop time: 3:07 PM  ASA Class: 3  An independent trained observer pushed medications at my direction. Estimated blood loss around 30ML    The patient was monitored continuously with the ECG, pulse oximetry, blood pressure monitoring, and direct observation for level of consciousness. Complications: None      Detailed Description of Procedure:         After obtaining informed written consent, the patient was brought to the catheterization laboratory where the left chest area was prepared in the usual sterile fashion. The patient was monitored continuously with ECG, pulse oximetry, blood pressure monitoring and direct observation. Additionally, please review the \"[facility] Procedure Report\", which is generated electronically through the [employer]. This report includes additional details regarding this procedure including, but not limited to:     1. Patient Data,   2. Admission,   3. Procedure,   4. Hemodynamics,   5. Vital Signs,   6. Medications, including conscious sedation medications given during the procedure (as note above),   7. Procedure Log,   8. Device Usage,   9. Signature Audit [place], and,   10. Signatures. It should be noted, that I sign the \"Signatures\" line electronically through the \"HPF Def Portals\" tab on my computer. The 's hands were scrubbed in a betadine solution for 5 minutes. Moderate conscious sedation was administered at my direction. Utilizing local anesthesia and a percutaneous technique, a single puncture was made in the left subclavian vein.  Utilizing a combination of sharp and blunt dissection, a pacemaker pocket was created. Single puncture technique was utilized for dual access to the left subclavian vein. The right ventricular and right atrial leads were inserted without difficulty and appropriate thresholds were obtained. The lead anchors were secured to the floor of the pacemaker pocket. The pacemaker pocket was copiously irrigated with antibiotic solution. The leads were attached to the generator. An antibiotic pouch was placed around the generator and leads and placed in the pocket. The subcutaneous and cutaneous tissues were approximated, and a sterile dressing applied. He was then taken to his room in stable and satisfactory condition. Complications:  none    Technical Information:       Model # Serial #        Right Atrial Lead ([employer]) 5076 - 52 PJN [id number]   Right Ventricular Lead ([employer]) 5076 - 58 PJN [id number]          Pulse Width (ms) Voltage (V) Current (mA) Impedance (Ohms) P / R Wave (mV)            Right Atrial Lead 0.4  0.75   475  1.25   Right Ventricular Lead 0.4  0.75   589  8.75         Generator Product Name Model #: Serial #:        NAHOMY XT DR WILHELM  [id number]  RNB [id number]             IMPRESSION:    1. Successful insertion of a dual chamber rate response pacemaker for symptomatic complete heart block. 2.  Successful insertion of a right atrial lead  3. Successful insertion of a right ventricular lead  4. Successful pacemaker pocket creation  5. Successful placement of an antibiotic pouch  6.   Supervision of the administration of moderate conscious sedation      Electronically signed by Leonel Cano MD on 11/8/21      Electronically signed by Leonel Cano MD on 11/8/2021 at 3:19 PM

## 2021-11-08 NOTE — PROGRESS NOTES
Cardiac catheterization preliminary note:      Temporary transvenous pacemaker placed via right femoral vein prior to catheterization. Patent stents in proximal circumflex, proximal mid and distal LAD. RCA with distal new 80 to 85% stenosis. EF appears depressed around 30 to 35% on LV gram.  PCI to distal RCA with SUSANNE x1 given new lesion with new AV leobardo heart block with lesion subtending circulation to AV node. .    No recovery noted in high-grade AV block with low escape rate in the 30s. Remains on IV dopamine with temporary transvenous pacemaker. Last LV ejection fraction reported at 40 to 45% on echo. Have discussed with [employer] representatives. ICD placement at this time would not be covered and will need to wait 90 days post PCI to RCA. Given instability and ongoing severe third-degree heart block, we will proceed with dual-chamber pacemaker placement at this time. We will follow up repeat echo for decision regarding LifeVest placement if EF less than 35%. Have discussed this with patient in detail and he understands fully. David Dupont(Attending)

## 2021-11-08 NOTE — PLAN OF CARE
Problem: Discharge Planning:  Goal: Participates in care planning  Description: Participates in care planning  11/7/2021 2345 by Rola Duval RN  Outcome: Ongoing  11/7/2021 1615 by Antelmo Mejia RN  Outcome: Ongoing  Goal: Discharged to appropriate level of care  Description: Discharged to appropriate level of care  11/7/2021 2345 by Rola Duval RN  Outcome: Ongoing  11/7/2021 1615 by Antelmo Mejia RN  Outcome: Ongoing     Problem: Airway Clearance - Ineffective:  Goal: Ability to maintain a clear airway will improve  Description: Ability to maintain a clear airway will improve  11/7/2021 2345 by Rola Duval RN  Outcome: Ongoing  11/7/2021 1615 by Antelmo Mejia RN  Outcome: Ongoing     Problem: Aspiration:  Goal: Absence of aspiration  Description: Absence of aspiration  11/7/2021 2345 by Rola Duval RN  Outcome: Ongoing  11/7/2021 1615 by Antelmo Mejia RN  Outcome: Ongoing     Problem: Mental Status - Impaired:  Goal: Mental status will be restored to baseline  Description: Mental status will be restored to baseline  11/7/2021 2345 by Rola Duval RN  Outcome: Ongoing  11/7/2021 1615 by Antelmo Mejia RN  Outcome: Ongoing     Problem: Pain:  Goal: Pain level will decrease  Description: Pain level will decrease  11/7/2021 2345 by Rola Duval RN  Outcome: Ongoing  11/7/2021 1615 by Antelmo Mejia RN  Outcome: Ongoing  Goal: Recognizes and communicates pain  Description: Recognizes and communicates pain  11/7/2021 2345 by Rola Duval RN  Outcome: Ongoing  11/7/2021 1615 by Antelmo Mejia RN  Outcome: Ongoing  Goal: Control of acute pain  Description: Control of acute pain  11/7/2021 2345 by Rola Duval RN  Outcome: Ongoing  11/7/2021 1615 by Antelmo Mejia RN  Outcome: Ongoing  Goal: Control of chronic pain  Description: Control of chronic pain  11/7/2021 2345 by Rola Duval RN  Outcome: Ongoing  11/7/2021 1615 by Antelmo Mejia RN  Outcome: Ongoing     Problem: Skin Integrity - Impaired:  Goal: Will show no infection signs and symptoms  Description: Will show no infection signs and symptoms  11/7/2021 2345 by Boogie Duenas RN  Outcome: Ongoing  11/7/2021 1615 by Citlali Wolfe RN  Outcome: Ongoing  Goal: Absence of new skin breakdown  Description: Absence of new skin breakdown  11/7/2021 2345 by Boogie Duenas RN  Outcome: Ongoing  11/7/2021 1615 by Citlali Wolfe RN  Outcome: Ongoing

## 2021-11-09 VITALS
TEMPERATURE: 98.4 F | BODY MASS INDEX: 30.53 KG/M2 | DIASTOLIC BLOOD PRESSURE: 74 MMHG | SYSTOLIC BLOOD PRESSURE: 150 MMHG | OXYGEN SATURATION: 100 % | HEART RATE: 94 BPM | WEIGHT: 245.5 LBS | HEIGHT: 75 IN | RESPIRATION RATE: 19 BRPM

## 2021-11-09 PROBLEM — I44.2 HEART BLOCK AV THIRD DEGREE (HCC): Status: RESOLVED | Noted: 2021-11-07 | Resolved: 2021-11-09

## 2021-11-09 LAB
ANION GAP SERPL CALCULATED.3IONS-SCNC: 13 MMOL/L (ref 7–19)
BUN BLDV-MCNC: 20 MG/DL (ref 8–23)
CALCIUM SERPL-MCNC: 8.6 MG/DL (ref 8.8–10.2)
CHLORIDE BLD-SCNC: 106 MMOL/L (ref 98–111)
CO2: 20 MMOL/L (ref 22–29)
CREAT SERPL-MCNC: 1 MG/DL (ref 0.5–1.2)
EKG P AXIS: 80 DEGREES
EKG P-R INTERVAL: 212 MS
EKG Q-T INTERVAL: 452 MS
EKG QRS DURATION: 154 MS
EKG QTC CALCULATION (BAZETT): 466 MS
EKG T AXIS: 85 DEGREES
GFR AFRICAN AMERICAN: >59
GFR NON-AFRICAN AMERICAN: >60
GLUCOSE BLD-MCNC: 97 MG/DL (ref 74–109)
HCT VFR BLD CALC: 37.2 % (ref 42–52)
HEMOGLOBIN: 12.4 G/DL (ref 14–18)
LV EF: 43 %
LVEF MODALITY: NORMAL
MCH RBC QN AUTO: 32 PG (ref 27–31)
MCHC RBC AUTO-ENTMCNC: 33.3 G/DL (ref 33–37)
MCV RBC AUTO: 96.1 FL (ref 80–94)
PDW BLD-RTO: 13 % (ref 11.5–14.5)
PLATELET # BLD: 136 K/UL (ref 130–400)
PMV BLD AUTO: 9.4 FL (ref 9.4–12.4)
POTASSIUM SERPL-SCNC: 4 MMOL/L (ref 3.5–5)
RBC # BLD: 3.87 M/UL (ref 4.7–6.1)
SODIUM BLD-SCNC: 139 MMOL/L (ref 136–145)
WBC # BLD: 8 K/UL (ref 4.8–10.8)

## 2021-11-09 PROCEDURE — 99024 POSTOP FOLLOW-UP VISIT: CPT | Performed by: INTERNAL MEDICINE

## 2021-11-09 PROCEDURE — 93010 ELECTROCARDIOGRAM REPORT: CPT | Performed by: INTERNAL MEDICINE

## 2021-11-09 PROCEDURE — 36415 COLL VENOUS BLD VENIPUNCTURE: CPT

## 2021-11-09 PROCEDURE — 90686 IIV4 VACC NO PRSV 0.5 ML IM: CPT | Performed by: INTERNAL MEDICINE

## 2021-11-09 PROCEDURE — 6360000002 HC RX W HCPCS: Performed by: INTERNAL MEDICINE

## 2021-11-09 PROCEDURE — 80048 BASIC METABOLIC PNL TOTAL CA: CPT

## 2021-11-09 PROCEDURE — G0008 ADMIN INFLUENZA VIRUS VAC: HCPCS | Performed by: INTERNAL MEDICINE

## 2021-11-09 PROCEDURE — 6370000000 HC RX 637 (ALT 250 FOR IP): Performed by: INTERNAL MEDICINE

## 2021-11-09 PROCEDURE — C8929 TTE W OR WO FOL WCON,DOPPLER: HCPCS

## 2021-11-09 PROCEDURE — 85027 COMPLETE CBC AUTOMATED: CPT

## 2021-11-09 PROCEDURE — 2580000003 HC RX 258: Performed by: INTERNAL MEDICINE

## 2021-11-09 RX ORDER — CLOPIDOGREL BISULFATE 75 MG/1
75 TABLET ORAL DAILY
Qty: 30 TABLET | Refills: 3 | Status: SHIPPED | OUTPATIENT
Start: 2021-11-10 | End: 2022-05-19 | Stop reason: SDUPTHER

## 2021-11-09 RX ORDER — CLOPIDOGREL BISULFATE 75 MG/1
75 TABLET ORAL DAILY
Qty: 30 TABLET | Refills: 3 | Status: SHIPPED | OUTPATIENT
Start: 2021-11-10 | End: 2021-11-09

## 2021-11-09 RX ORDER — CARVEDILOL 6.25 MG/1
6.25 TABLET ORAL 2 TIMES DAILY WITH MEALS
Status: DISCONTINUED | OUTPATIENT
Start: 2021-11-09 | End: 2021-11-09 | Stop reason: HOSPADM

## 2021-11-09 RX ORDER — CARVEDILOL 6.25 MG/1
6.25 TABLET ORAL 2 TIMES DAILY WITH MEALS
Qty: 60 TABLET | Refills: 3 | Status: SHIPPED | OUTPATIENT
Start: 2021-11-09 | End: 2022-05-19 | Stop reason: SDUPTHER

## 2021-11-09 RX ORDER — CARVEDILOL 6.25 MG/1
6.25 TABLET ORAL 2 TIMES DAILY WITH MEALS
Qty: 60 TABLET | Refills: 3 | Status: SHIPPED | OUTPATIENT
Start: 2021-11-09 | End: 2021-11-09

## 2021-11-09 RX ADMIN — PENTOXIFYLLINE 400 MG: 400 TABLET, EXTENDED RELEASE ORAL at 07:17

## 2021-11-09 RX ADMIN — ASPIRIN 81 MG: 81 TABLET, CHEWABLE ORAL at 07:17

## 2021-11-09 RX ADMIN — SACUBITRIL AND VALSARTAN 1 TABLET: 24; 26 TABLET, FILM COATED ORAL at 07:17

## 2021-11-09 RX ADMIN — METOCLOPRAMIDE HYDROCHLORIDE 10 MG: 5 INJECTION INTRAMUSCULAR; INTRAVENOUS at 05:47

## 2021-11-09 RX ADMIN — CLOPIDOGREL BISULFATE 75 MG: 75 TABLET ORAL at 07:17

## 2021-11-09 RX ADMIN — INFLUENZA A VIRUS A/VICTORIA/2570/2019 IVR-215 (H1N1) ANTIGEN (PROPIOLACTONE INACTIVATED), INFLUENZA A VIRUS A/CAMBODIA/E0826360/2020 IVR-224 (H3N2) ANTIGEN (PROPIOLACTONE INACTIVATED), INFLUENZA B VIRUS B/VICTORIA/705/2018 BVR-11 ANTIGEN (PROPIOLACTONE INACTIVATED), INFLUENZA B VIRUS B/PHUKET/3073/2013 BVR-1B ANTIGEN (PROPIOLACTONE INACTIVATED) 0.5 ML: 15; 15; 15; 15 INJECTION, SUSPENSION INTRAMUSCULAR at 11:29

## 2021-11-09 RX ADMIN — SODIUM CHLORIDE: 9 INJECTION, SOLUTION INTRAVENOUS at 05:45

## 2021-11-09 RX ADMIN — CARVEDILOL 6.25 MG: 6.25 TABLET, FILM COATED ORAL at 10:29

## 2021-11-09 RX ADMIN — ATORVASTATIN CALCIUM 40 MG: 40 TABLET, FILM COATED ORAL at 07:17

## 2021-11-09 RX ADMIN — SODIUM CHLORIDE, PRESERVATIVE FREE 10 ML: 5 INJECTION INTRAVENOUS at 07:17

## 2021-11-09 ASSESSMENT — PAIN SCALES - GENERAL
PAINLEVEL_OUTOF10: 0

## 2021-11-09 NOTE — PLAN OF CARE
Problem: Discharge Planning:  Goal: Participates in care planning  Description: Participates in care planning  11/9/2021 0556 by Juan David Tinsley RN  Outcome: Ongoing  11/8/2021 1743 by Katie Soares RN  Outcome: Ongoing  Goal: Discharged to appropriate level of care  Description: Discharged to appropriate level of care  11/9/2021 0556 by Juan David Tinsley RN  Outcome: Ongoing  11/8/2021 1743 by Katie Soares RN  Outcome: Ongoing     Problem: Airway Clearance - Ineffective:  Goal: Ability to maintain a clear airway will improve  Description: Ability to maintain a clear airway will improve  11/9/2021 0556 by Juan David Tinsley RN  Outcome: Ongoing  11/8/2021 1743 by Katie Soares RN  Outcome: Ongoing     Problem: Anxiety/Stress:  Goal: Level of anxiety will decrease  Description: Level of anxiety will decrease  11/9/2021 0556 by Juan David Tinsley RN  Outcome: Ongoing  11/8/2021 1743 by Katie Soares RN  Outcome: Ongoing     Problem: Aspiration:  Goal: Absence of aspiration  Description: Absence of aspiration  11/9/2021 0556 by Juan David Tinsley RN  Outcome: Ongoing  11/8/2021 1743 by Katie Soares RN  Outcome: Ongoing     Problem:  Bowel Function - Altered:  Goal: Bowel elimination is within specified parameters  Description: Bowel elimination is within specified parameters  11/9/2021 0556 by Juan David Tinsley RN  Outcome: Ongoing  11/8/2021 1743 by Katie Soares RN  Outcome: Ongoing     Problem: Cardiac Output - Decreased:  Goal: Hemodynamic stability will improve  Description: Hemodynamic stability will improve  11/9/2021 0556 by Juan David Tinsley RN  Outcome: Ongoing  11/8/2021 1743 by Katie Soares RN  Outcome: Ongoing     Problem: Fluid Volume - Imbalance:  Goal: Absence of imbalanced fluid volume signs and symptoms  Description: Absence of imbalanced fluid volume signs and symptoms  11/9/2021 0556 by Juan David Tinsley RN  Outcome: Ongoing  11/8/2021 1743 by Katie Soares RN  Outcome: Ongoing     Problem: Gas Exchange - Impaired:  Goal: Levels of oxygenation will improve  Description: Levels of oxygenation will improve  11/9/2021 0556 by Manny Elmore RN  Outcome: Ongoing  11/8/2021 1743 by Ad Qiu RN  Outcome: Ongoing     Problem: Mental Status - Impaired:  Goal: Mental status will be restored to baseline  Description: Mental status will be restored to baseline  11/9/2021 0556 by Manny Elmore RN  Outcome: Ongoing  11/8/2021 1743 by Ad Qiu RN  Outcome: Ongoing     Problem: Nutrition Deficit:  Goal: Ability to achieve adequate nutritional intake will improve  Description: Ability to achieve adequate nutritional intake will improve  11/9/2021 0556 by Manny Elmore RN  Outcome: Ongoing  11/8/2021 1743 by Ad Qiu RN  Outcome: Ongoing     Problem: Pain:  Goal: Pain level will decrease  Description: Pain level will decrease  11/9/2021 0556 by Manny Elmore RN  Outcome: Ongoing  11/8/2021 1743 by Ad Qiu RN  Outcome: Ongoing  Goal: Recognizes and communicates pain  Description: Recognizes and communicates pain  11/9/2021 0556 by Manny Elmore RN  Outcome: Ongoing  11/8/2021 1743 by Ad Qiu RN  Outcome: Ongoing  Goal: Control of acute pain  Description: Control of acute pain  11/9/2021 0556 by Manny Elmore RN  Outcome: Ongoing  11/8/2021 1743 by Ad Qiu RN  Outcome: Ongoing  Goal: Control of chronic pain  Description: Control of chronic pain  11/9/2021 0556 by Manny Elmore RN  Outcome: Ongoing  11/8/2021 1743 by Ad Qiu RN  Outcome: Ongoing     Problem: Serum Glucose Level - Abnormal:  Goal: Ability to maintain appropriate glucose levels will improve to within specified parameters  Description: Ability to maintain appropriate glucose levels will improve to within specified parameters  11/9/2021 0556 by Manny Elmore RN  Outcome: Ongoing  11/8/2021 1743 by Ad Qiu RN  Outcome: Ongoing     Problem: Skin Integrity - Impaired:  Goal: Will show no infection signs and symptoms  Description: Will show no infection signs and symptoms  11/9/2021 0556 by Raven Goodman RN  Outcome: Ongoing  11/8/2021 1743 by Haley Bell RN  Outcome: Ongoing  Goal: Absence of new skin breakdown  Description: Absence of new skin breakdown  11/9/2021 0556 by Raven Goodman RN  Outcome: Ongoing  11/8/2021 1743 by Haley Bell RN  Outcome: Ongoing     Problem: Sleep Pattern Disturbance:  Goal: Appears well-rested  Description: Appears well-rested  11/9/2021 0556 by Raven Goodman RN  Outcome: Ongoing  11/8/2021 1743 by Haley Bell RN  Outcome: Ongoing     Problem: Tissue Perfusion, Altered:  Goal: Circulatory function within specified parameters  Description: Circulatory function within specified parameters  11/9/2021 0556 by Raven Goodman RN  Outcome: Ongoing  11/8/2021 1743 by Haley Bell RN  Outcome: Ongoing     Problem: Tissue Perfusion - Cardiopulmonary, Altered:  Goal: Absence of angina  Description: Absence of angina  11/9/2021 0556 by Raven Goodman RN  Outcome: Ongoing  11/8/2021 1743 by Haley Bell RN  Outcome: Ongoing  Goal: Hemodynamic stability will improve  Description: Hemodynamic stability will improve  11/9/2021 0556 by Raven Goodman RN  Outcome: Ongoing  11/8/2021 1743 by Haley Bell RN  Outcome: Ongoing     Problem: Cardiac Output - Decreased:  Goal: Hemodynamic stability will improve  Description: Hemodynamic stability will improve  11/9/2021 0556 by Raven Goodman RN  Outcome: Ongoing  11/8/2021 1743 by Haley Bell RN  Outcome: Ongoing

## 2021-11-09 NOTE — DISCHARGE SUMMARY
Discharge Summary    June Ocasio  :    MRN:  [id number]    Admit date:  2021  Discharge date: 2021     Admitting Physician:  Jenna Powell DO    Advance Directive: Full Code    Consults: cardiology    Primary Care Physician:  Henok Venegas MD    Discharge Diagnoses: Active Problems:    * No active hospital problems. *  Resolved Problems:    Heart block AV third degree ([address]      Significant Diagnostic Studies:   XR CHEST PORTABLE    Result Date: 2021  EXAMINATION: XR CHEST PORTABLE 2021 5:19 PM HISTORY: Pacemaker placement, evaluate for pneumothorax COMPARISON: 2021 FINDINGS: The heart appears normal in size. Atherosclerotic calcifications are seen in the aorta. There has been interval placement of a left subclavian approach dual lead cardiac pacing device with tips projecting over the right atrium and right ventricle. There is no appreciable pneumothorax. There is mild central bronchial wall thickening. No pneumothorax following cardiac pacemaker placement. Signed by Dr Alexx Navarro    XR CHEST PORTABLE    Result Date: 2021  EXAMINATION: XR CHEST PORTABLE 2021 3:55 PM HISTORY: XR CHEST PORTABLE 2021 2:54 PM HISTORY: Chest pain COMPARISON: None. FINDINGS: The lungs are clear. Cardiac silhouettes mildly enlarged. . The osseous structures and surrounding soft tissues demonstrate no acute abnormality. 1. Mild cardiomegaly. There is no evidence of pulmonary vascular congestion.  Signed by Dr Livia Baer      CBC:   Recent Labs     21  1429 21  0334 21  0223   WBC 10.4 11.1* 8.0   HGB 14.1 14.6 12.4*    171 136     BMP:    Recent Labs     21  1429 21  1743 21  0334 21  0223     --  141 139   K 5.0 5.2* 4.1 4.0     --  106 106   CO2 23  --  24 20*   BUN 26*  --  27* 20   CREATININE 1.5*  --  1.4* 1.0   GLUCOSE 104  --  132* 97     INR:   Recent Labs     21  1743   INR 0.98     Lipids: No results for input(s): CHOL, HDL in the last 72 hours. Invalid input(s): LDLCALCU  ABGs:No results for input(s): PHART, ZSD7AUR, PO2ART, JGE0SCL, BEART, HGBAE, Q0MHBQPJ, CARBOXHGBART, 02THERAPY in the last 72 hours. HgBA1c:  No results for input(s): LABA1C in the last 72 hours. Procedures: Dual-chamber pacemaker insertion  Hospital Course: Mr. DENNY [place]JES[place]S was admitted on 11/7 for third-degree heart block. He was admitted to the ICU and started on a dopamine infusion. He was able to maintain his blood pressure without difficulty. Consultation was obtained with cardiology. He underwent placement of a dual-chamber pacemaker on 11/8. He remained clinically stable post procedure. On the morning of 11/9 he was adequately paced. At this point I felt he had reached maximal hospital benefit was stable for discharge. Physical Exam:  Vital Signs: BP (!) 150/74   Pulse 79   Temp 98.4 °F (36.9 °C) (Temporal)   Resp 19   Ht 6' 3\" (1.905 m)   Wt 245 lb 8 oz (111.4 kg)   SpO2 97%   BMI 30.69 kg/m²   General appearance:. Alert and Cooperative   HEENT: Normocephalic. Chest: clear to auscultation bilaterally without wheezes or rhonchi. Cardiac: Normal heart tones with regular rate and rhythm, S1, S2 normal. No murmurs, gallops, or rubs auscultated. Abdomen:soft, non-tender; normal bowel sounds, no masses, no organomegaly. Extremities: No clubbing or cyanosis. No peripheral edema. Peripheral pulses palpable. Neurologic: Grossly intact. Discharge Medications:    @DISCHARGEMEDSLIST(<NOROUTINE> error)@    Discharge Instructions: Follow up with Komal Caceres MD in 3-5 days. Take medications as directed. Resume activity as tolerated. Diet: ADULT DIET; Regular     Disposition: Patient is medically stable and will be discharged to home. Time spent on discharge 40 minutes.     Signed:  Angie Ibarra DO

## 2021-11-09 NOTE — PROGRESS NOTES
TR band was removed from pt. Puncture site was clean, dry, intact. No bleeding at the site. Transparent dressing applied.

## 2021-11-09 NOTE — PROGRESS NOTES
AVS given to patient, signed copy placed in chart. Instructed patient and daughter to not raise affected arm above head for 30 days, and do not shower for 1 week. RN instructed on follow up instructions per MD. RN went over med rec. All questions answered. Pacemaker pamphlets given to patient . Rn transported patient via wheelchair to main entrance , patient left via private car.

## 2021-11-09 NOTE — PROGRESS NOTES
Cardiology Progress Note Gurjit Sutton MD      Patient:  Kierra Glasgow  [id number]    Patient Active Problem List    Diagnosis Date Noted    Heart block AV third degree ([facility] 11/07/2021     Priority: Low    History of amputation of lesser toe of right foot ([facility] 05/06/2021     Priority: Low    Open toe wound 07/25/2019     Priority: Low    Chronic systolic CHF (congestive heart failure) ([address] 07/02/2019     Priority: Low    Coronary artery disease involving native coronary artery of native heart without angina pectoris 07/02/2019     Priority: Low    Hypotension due to drugs 07/02/2019     Priority: Low    AAA (abdominal aortic aneurysm) without rupture ([facility] 06/21/2019     Priority: Low    History of acute congestive heart failure      Priority: Low    NSTEMI (non-ST elevated myocardial infarction) ([facility] 05/20/2019     Priority: Low    Acute pain of right shoulder 02/21/2019     Priority: Low    Hearing aid worn 10/03/2017     Priority: Low     Overview Note:     left      Idiopathic peripheral neuropathy 12/14/2016     Priority: Low    Benign nodular prostatic hyperplasia without lower urinary tract symptoms 12/09/2016     Priority: Low    Peripheral vascular disease ([address] 12/09/2016     Priority: Low     Overview Note:     Dorsalis pedis pulses slightly diminished on the right to 1+ where is 2+ on the left. Posterior tibial pulses are +2 and symmetrical bilaterally.  Tinea pedis of left foot 12/09/2016     Priority: Low     Overview Note:     4/5 interspace.       Neuropathy 03/15/2016     Priority: Low    Gout 09/17/2014     Priority: Low    Anxiety 09/17/2014     Priority: Low    Mixed hyperlipidemia 09/17/2014     Priority: Low       Admit Date:  11/7/2021    Admission Problem List: Present on Admission:   Heart block AV third degree [facility])      Cardiac Specific Data:  Specialty Problems        Cardiology Problems    Mixed hyperlipidemia        Peripheral vascular disease ([facility]        NSTEMI (non-ST elevated myocardial infarction) [address]        AAA (abdominal aortic aneurysm) without rupture (HCC)        Chronic systolic CHF (congestive heart failure) (HCC)        Coronary artery disease involving native coronary artery of native heart without angina pectoris        Hypotension due to drugs        Heart block AV third degree ([facility]            1. Complete heart block, insidious onset with baseline right bundle branch block, left anterior fascicular block,s/p transvenous pacemaker followed by permanent pacemaker placement. 2.  Coronary artery disease with ischemic cardiomyopathy, prior PCI post MI to proximal circumflex, proximal mid and distal LAD with DESx4, ejection fraction improving to 40 to 45%, new distal RCA lesion 11/8/2021 treated with SUSANNE x1.  3.  Peripheral arterial disease with right third toe amputation for nonhealing wound 4/15/2021. 4.  Prior tobacco use stopped 1980. Subjective:  Mr. Heather Flannery is doing well post PCI and pacemaker placement. No issues overnight. Remains 100% ventricular paced. Has ambulated in his room. Blood pressure stable. Objective:   BP (!) 148/74   Pulse 85   Temp 98.4 °F (36.9 °C) (Temporal)   Resp 21   Ht 6' 3\" (1.905 m)   Wt 245 lb 8 oz (111.4 kg)   SpO2 97%   BMI 30.69 kg/m²       Intake/Output Summary (Last 24 hours) at 11/9/2021 0957  Last data filed at 11/9/2021 0915  Gross per 24 hour   Intake 2485.34 ml   Output 2950 ml   Net -464.66 ml       Prior to Admission medications    Medication Sig Start Date End Date Taking? Authorizing Provider   Multiple Vitamins-Minerals (MULTIVITAMIN ADULT, MINERALS,) TABS Take 1 tablet by mouth daily 10/18/21   Cesar Vásquez MD   aspirin 81 MG chewable tablet TAKE 1 TABLET BY MOUTH EVERY DAY 10/14/21   Lucpérez Voraagi APRN   furosemide (LASIX) 20 MG tablet Take 1 tablet by mouth daily 10/14/21   Lucpérez Bernsteinyanagi, APRN   nitroGLYCERIN (NITROSTAT) 0.4 MG SL tablet up to max of 3 total doses.  If no relief after 1 dose, call 911. 9/22/21   Nancy Mills MD   atorvastatin (LIPITOR) 40 MG tablet Take 1 tablet by mouth daily at night. 9/9/21   Nancy Mills MD   sildenafil (VIAGRA) 100 MG tablet TAKE 1 TABLET BY MOUTH AS NEEDED FOR ERECTILE DYSFUNCTION 7/29/21   Nancy Mills MD   pentoxifylline (TRENTAL) 400 MG extended release tablet Take 1 tablet by mouth 2 times daily (with meals) Do not crush or break. 7/21/21   Nancy Mills MD   ENTRESTO 24-26 MG per tablet TAKE 1 TABLET BY MOUTH TWICE A DAY 1/13/21   CHRIS Carrasco   carvedilol (COREG) 3.125 MG tablet Take 1 tablet by mouth 2 times daily 12/22/20   CHRIS King   Omega-3 Fatty Acids (FISH OIL PO) Take by mouth daily    Historical Provider, MD   Multiple Vitamins-Minerals (ONE DAILY MULTIVITAMIN MEN PO) Take by mouth daily    Historical Provider, MD        chlorhexidine gluconate   Topical Once    clopidogrel  75 mg Oral Daily    sacubitril-valsartan  1 tablet Oral BID    pentoxifylline  400 mg Oral BID WC    atorvastatin  40 mg Oral Daily    aspirin  81 mg Oral Daily    sodium chloride flush  10 mL IntraVENous 2 times per day    enoxaparin  40 mg SubCUTAneous Q24H    influenza virus vaccine  0.5 mL IntraMUSCular Prior to discharge    metoclopramide  10 mg IntraVENous Q6H       TELEMETRY: Sinus and ventricular paced    Physical Exam:      Physical Exam  HENT:      Mouth/Throat:      Pharynx: No oropharyngeal exudate. Eyes:      General: No scleral icterus. Right eye: No discharge. Left eye: No discharge. Neck:      Thyroid: No thyromegaly. Vascular: No JVD. Cardiovascular:      Rate and Rhythm: Normal rate and regular rhythm. Heart sounds: No murmur heard. No friction rub. No gallop. Comments: No JVD  No edema  Left-sided pacemaker site with no hematoma no swelling  Pulmonary:      Effort: No respiratory distress. Breath sounds: No stridor. No wheezing or rales.    Abdominal:      General: Bowel sounds are normal. There is no distension. Palpations: Abdomen is soft. There is no mass. Tenderness: There is no abdominal tenderness. There is no guarding or rebound. Comments: No palpable organomegaly   Musculoskeletal:         General: No deformity. Skin:     General: Skin is warm. Coloration: Skin is not pale. Findings: No erythema or rash. Neurological:      Mental Status: He is alert and oriented to person, place, and time. Motor: No abnormal muscle tone. Coordination: Coordination normal.      Deep Tendon Reflexes: Reflexes normal.                 Lab Data:  CBC:   Recent Labs     11/07/21  1429 11/08/21  0334 11/09/21  0223   WBC 10.4 11.1* 8.0   HGB 14.1 14.6 12.4*   HCT 44.0 42.9 37.2*   MCV 99.5* 97.1* 96.1*    171 136     BMP:   Recent Labs     11/07/21  1429 11/07/21  1743 11/08/21 0334 11/09/21 0223     --  141 139   K 5.0 5.2* 4.1 4.0     --  106 106   CO2 23  --  24 20*   BUN 26*  --  27* 20   CREATININE 1.5*  --  1.4* 1.0     LIVER PROFILE:   Recent Labs     11/07/21  1429   AST 20   ALT 28   BILITOT 0.6   ALKPHOS 81     PT/INR:   Recent Labs     11/07/21  1743   PROTIME 13.2   INR 0.98     APTT:   Recent Labs     11/07/21  1743   APTT 26.6     BNP:  No results for input(s): BNP in the last 72 hours. CK, CKMB, Troponin: @LABRCNT (CKTOTAL:3, CKMB:3, TROPONINI:3)@    IMAGING:  XR CHEST PORTABLE    Result Date: 11/8/2021  EXAMINATION: XR CHEST PORTABLE 11/8/2021 5:19 PM HISTORY: Pacemaker placement, evaluate for pneumothorax COMPARISON: 11/7/2021 FINDINGS: The heart appears normal in size. Atherosclerotic calcifications are seen in the aorta. There has been interval placement of a left subclavian approach dual lead cardiac pacing device with tips projecting over the right atrium and right ventricle. There is no appreciable pneumothorax. There is mild central bronchial wall thickening. No pneumothorax following cardiac pacemaker placement.  Signed by

## 2021-11-14 NOTE — PROGRESS NOTES
Physician Progress Note      PATIENT:               Jacque Gil  CSN #:                  [id number]  :                       1950  ADMIT DATE:       2021 2:19 PM  [address] [place] DATE:        2021 11:57 AM  RESPONDING  PROVIDER #:        Rick Olivares 1939 [facility] DO          QUERY TEXT:    Pt admitted with Complete Heart Block. Noted documentation of Chronic   Systolic HF in H&P, Consult, Progress notes. If possible, please document if   this diagnosis on current admission or document if  is PMH only. The medical record reflects the following:  Risk Factors: sob, chest pain  hx chf  Clinical Indicators: BNP 1499  RR 18, 13 in ER  normal breath sounds Echo with   EF 40-45%,  Treatment: Echo, Continued Entresto PO as at home. Thanks ROSE MARY Dia  [phone]  Options provided:  -- Chronic Systolic HF currently being treated/evaluated  -- Chronic Systolic HF is PMH only  -- Other - I will add my own diagnosis  -- Disagree - Not applicable / Not valid  -- Disagree - Clinically unable to determine / Unknown  -- Refer to Clinical Documentation Reviewer    PROVIDER RESPONSE TEXT:    Chronic Systolic HF is PMH only.     Query created by: Corrinne Manis on 11/10/2021 12:07 PM      Electronically signed by:  Wil Patricio DO 2021 5:25 PM

## 2021-11-15 RX ORDER — PENTOXIFYLLINE 400 MG/1
TABLET, EXTENDED RELEASE ORAL
Qty: 270 TABLET | Refills: 1 | Status: SHIPPED | OUTPATIENT
Start: 2021-11-15 | End: 2022-05-19 | Stop reason: SDUPTHER

## 2021-11-15 RX ORDER — ATORVASTATIN CALCIUM 40 MG/1
TABLET, FILM COATED ORAL
Qty: 90 TABLET | Refills: 2 | Status: SHIPPED | OUTPATIENT
Start: 2021-11-15 | End: 2022-05-19 | Stop reason: SDUPTHER

## 2021-11-16 RX ORDER — SACUBITRIL AND VALSARTAN 24; 26 MG/1; MG/1
TABLET, FILM COATED ORAL
Qty: 180 TABLET | Refills: 0 | Status: SHIPPED | OUTPATIENT
Start: 2021-11-16 | End: 2022-02-08

## 2021-11-17 ENCOUNTER — NURSE ONLY (OUTPATIENT)
Dept: CARDIOLOGY CLINIC | Age: 71
End: 2021-11-17

## 2021-11-17 DIAGNOSIS — Z51.89 VISIT FOR WOUND CHECK: Primary | ICD-10-CM

## 2021-11-17 PROCEDURE — 99024 POSTOP FOLLOW-UP VISIT: CPT | Performed by: INTERNAL MEDICINE

## 2021-11-17 NOTE — PROGRESS NOTES
Patient here for a wound check visit status post pacemaker implant. Incision dry and intact. No redness, swelling, or drainage noted. Some scab areas noted and skin tears from tegaderm and steri strips. Edges approximated. Site cleaned with iodine swab and steri strips applied to secure incision during healing process. Instructed patient to wash area with antibacterial soap and keep it clean and dry. Instructed patient to monitor site daily and call if any redness, drainage, swelling, or open areas. Reviewed discharged instructions and questions answered. Reviewed [employer] home monitor and patient verbalized understanding. Assisted patient with setting up [address] for his monitoring.     Follow up as scheduled

## 2021-12-06 ENCOUNTER — TELEPHONE (OUTPATIENT)
Dept: CARDIOLOGY CLINIC | Age: 71
End: 2021-12-06

## 2021-12-06 NOTE — TELEPHONE ENCOUNTER
NEEDING  to reschedule apt with Ms. Petetammi Geronimo. 12/06/2021 UNABLE TO LEAVE [facility]  KD

## 2021-12-08 DIAGNOSIS — Z95.0 PACEMAKER: Primary | ICD-10-CM

## 2021-12-08 DIAGNOSIS — I44.2 CHB (COMPLETE HEART BLOCK) (HCC): ICD-10-CM

## 2021-12-08 PROCEDURE — 93296 REM INTERROG EVL PM/IDS: CPT | Performed by: CLINICAL NURSE SPECIALIST

## 2021-12-08 PROCEDURE — 93294 REM INTERROG EVL PM/LDLS PM: CPT | Performed by: CLINICAL NURSE SPECIALIST

## 2021-12-16 ENCOUNTER — TELEPHONE (OUTPATIENT)
Dept: PRIMARY CARE CLINIC | Age: 71
End: 2021-12-16

## 2021-12-24 NOTE — PLAN OF CARE
Consult placed for neuroendocrine carcinoma, patient and wife have questions regarding chemo and directions of care. I see that patient is planning to go home with hospice, possibly today. I called patient's wife to see if I could help answer any of her questions. She states that she is just generally overwhelmed with patient's symptoms and care and needs additional support at home. She has questions about why his BP is repeatedly low. It appears he was diagnosed with orthostatic hypotension, and is getting started on treatment for this. I feel it is reasonable to go home with hospice if that is what patient and wife want to pursue. I also recommend patient discuss any additional questions regarding patients prior treatment and directions of care with his primary Oncologist, Dr. Grisel Correa next week when he is back in the office. Please re-consult with any specific oncology questions that can be managed as inpatient. Problem: Discharge Planning:  Goal: Participates in care planning  Description: Participates in care planning  11/9/2021 1039 by Sophie Willams RN  Outcome: Completed  11/9/2021 0556 by Eugenia Steward RN  Outcome: Ongoing  Goal: Discharged to appropriate level of care  Description: Discharged to appropriate level of care  11/9/2021 1039 by Sophie Willams RN  Outcome: Completed  11/9/2021 0556 by Eugenia Steward RN  Outcome: Ongoing     Problem: Airway Clearance - Ineffective:  Goal: Ability to maintain a clear airway will improve  Description: Ability to maintain a clear airway will improve  11/9/2021 1039 by Sophie Willams RN  Outcome: Completed  11/9/2021 0556 by Eugenia Steward RN  Outcome: Ongoing     Problem: Anxiety/Stress:  Goal: Level of anxiety will decrease  Description: Level of anxiety will decrease  11/9/2021 1039 by Sophie Willams RN  Outcome: Completed  11/9/2021 0556 by Eugenia Steward RN  Outcome: Ongoing     Problem: Aspiration:  Goal: Absence of aspiration  Description: Absence of aspiration  11/9/2021 1039 by Sophie Willams RN  Outcome: Completed  11/9/2021 0556 by Eugenia Steward RN  Outcome: Ongoing     Problem:  Bowel Function - Altered:  Goal: Bowel elimination is within specified parameters  Description: Bowel elimination is within specified parameters  11/9/2021 1039 by Sohpie Willams RN  Outcome: Completed  11/9/2021 0556 by Eugenia Steward RN  Outcome: Ongoing     Problem: Cardiac Output - Decreased:  Goal: Hemodynamic stability will improve  Description: Hemodynamic stability will improve  11/9/2021 1039 by Sophie Willams RN  Outcome: Completed  11/9/2021 0556 by Eugenia Steward RN  Outcome: Ongoing     Problem: Fluid Volume - Imbalance:  Goal: Absence of imbalanced fluid volume signs and symptoms  Description: Absence of imbalanced fluid volume signs and symptoms  11/9/2021 1039 by Sophie Willams RN  Outcome: Completed  11/9/2021 0556 by Eugenia Steward RN  Outcome: Ongoing     Problem: Gas Exchange - Impaired:  Goal: Levels of oxygenation will improve  Description: Levels of oxygenation will improve  11/9/2021 1039 by Evin Arreola RN  Outcome: Completed  11/9/2021 0556 by Stan Balbuena RN  Outcome: Ongoing     Problem: Mental Status - Impaired:  Goal: Mental status will be restored to baseline  Description: Mental status will be restored to baseline  11/9/2021 1039 by Evin Arreola RN  Outcome: Completed  11/9/2021 0556 by Stan Balbuena RN  Outcome: Ongoing     Problem: Nutrition Deficit:  Goal: Ability to achieve adequate nutritional intake will improve  Description: Ability to achieve adequate nutritional intake will improve  11/9/2021 1039 by Evin Arreola RN  Outcome: Completed  11/9/2021 0556 by Stan Balbuena RN  Outcome: Ongoing     Problem: Pain:  Goal: Pain level will decrease  Description: Pain level will decrease  11/9/2021 1039 by Evin Arreola RN  Outcome: Completed  11/9/2021 0556 by Stan Balbuena RN  Outcome: Ongoing  Goal: Recognizes and communicates pain  Description: Recognizes and communicates pain  11/9/2021 1039 by Evin Arreola RN  Outcome: Completed  11/9/2021 0556 by Stan Balbuena RN  Outcome: Ongoing  Goal: Control of acute pain  Description: Control of acute pain  11/9/2021 1039 by Evin Arreola RN  Outcome: Completed  11/9/2021 0556 by Stan Balbuena RN  Outcome: Ongoing  Goal: Control of chronic pain  Description: Control of chronic pain  11/9/2021 1039 by Evin Arreola RN  Outcome: Completed  11/9/2021 0556 by Stan Balbuena RN  Outcome: Ongoing     Problem: Serum Glucose Level - Abnormal:  Goal: Ability to maintain appropriate glucose levels will improve to within specified parameters  Description: Ability to maintain appropriate glucose levels will improve to within specified parameters  11/9/2021 1039 by Evin Arreola RN  Outcome: Completed  11/9/2021 0556 by Stan Balbuena RN  Outcome: Ongoing     Problem: Skin Integrity - Impaired:  Goal: Will show no infection signs and symptoms  Description: Will show no infection signs and symptoms  11/9/2021 1039 by Jv Carrillo RN  Outcome: Completed  11/9/2021 0556 by Karan Aquino RN  Outcome: Ongoing  Goal: Absence of new skin breakdown  Description: Absence of new skin breakdown  11/9/2021 1039 by Jv Carrillo RN  Outcome: Completed  11/9/2021 0556 by Karan Aquino RN  Outcome: Ongoing     Problem: Sleep Pattern Disturbance:  Goal: Appears well-rested  Description: Appears well-rested  11/9/2021 1039 by Jv Carrillo RN  Outcome: Completed  11/9/2021 0556 by Karan Aquino RN  Outcome: Ongoing     Problem: Tissue Perfusion, Altered:  Goal: Circulatory function within specified parameters  Description: Circulatory function within specified parameters  11/9/2021 1039 by Jv Carrillo RN  Outcome: Completed  11/9/2021 0556 by Karan Auqino RN  Outcome: Ongoing     Problem: Tissue Perfusion - Cardiopulmonary, Altered:  Goal: Absence of angina  Description: Absence of angina  11/9/2021 1039 by Jv Carrillo RN  Outcome: Completed  11/9/2021 0556 by Karan Aquino RN  Outcome: Ongoing  Goal: Hemodynamic stability will improve  Description: Hemodynamic stability will improve  11/9/2021 1039 by Jv Carrillo RN  Outcome: Completed  11/9/2021 0556 by Karan Aquino RN  Outcome: Ongoing     Problem: Cardiac Output - Decreased:  Goal: Hemodynamic stability will improve  Description: Hemodynamic stability will improve  11/9/2021 1039 by Jv Carrillo RN  Outcome: Completed  11/9/2021 0556 by Karan Aquino RN  Outcome: Ongoing

## 2021-12-30 NOTE — TELEPHONE ENCOUNTER
From: Saima Maciel  To: Tamara Virgen  Sent: 12/30/2021 9:08 AM CST  Subject: Worsening anxiety and depression last 2 weeks    This message is being sent by Candace Maciel on behalf of Saima Maciel.    Hi Dr. Virgen,    This week Saima is not doing well with anxiety and she is feeling very sad and “hopeless”. Last night she was close to a panic attack. We discussed increasing her dose of Sertraline at her last appointment and I think she needs an increase. Can we go up to 37.5 mg to start since she does have drowsiness from the sertraline? Thanks for your help!    Candace Macile   Okay to send letter for cardiac risk stratification

## 2022-01-01 NOTE — INTERVAL H&P NOTE
Update History & Physical    The patient's History and Physical of April 15, 2021 was reviewed with the patient and I examined the patient. There was no change. The surgical site was confirmed by the patient and me. Plan: The risks, benefits, expected outcome, and alternative to the recommended procedure have been discussed with the patient. Patient understands and wants to proceed with the procedure.      Electronically signed by Penny Lucio DO on 4/15/2021 at 7:31 AM
7 Hour(s) 36 Minute(s)

## 2022-01-06 ENCOUNTER — TELEPHONE (OUTPATIENT)
Dept: VASCULAR SURGERY | Age: 72
End: 2022-01-06

## 2022-01-10 ENCOUNTER — OFFICE VISIT (OUTPATIENT)
Dept: CARDIOLOGY CLINIC | Age: 72
End: 2022-01-10
Payer: COMMERCIAL

## 2022-01-10 VITALS
HEIGHT: 75 IN | BODY MASS INDEX: 30.96 KG/M2 | SYSTOLIC BLOOD PRESSURE: 134 MMHG | HEART RATE: 70 BPM | WEIGHT: 249 LBS | DIASTOLIC BLOOD PRESSURE: 80 MMHG

## 2022-01-10 DIAGNOSIS — E78.2 MIXED HYPERLIPIDEMIA: ICD-10-CM

## 2022-01-10 DIAGNOSIS — Z95.0 PACEMAKER: ICD-10-CM

## 2022-01-10 DIAGNOSIS — I10 ESSENTIAL HYPERTENSION: ICD-10-CM

## 2022-01-10 DIAGNOSIS — I25.10 CORONARY ARTERY DISEASE INVOLVING NATIVE CORONARY ARTERY OF NATIVE HEART WITHOUT ANGINA PECTORIS: Primary | ICD-10-CM

## 2022-01-10 DIAGNOSIS — I50.22 CHRONIC SYSTOLIC CHF (CONGESTIVE HEART FAILURE) (HCC): ICD-10-CM

## 2022-01-10 DIAGNOSIS — Z86.79: ICD-10-CM

## 2022-01-10 DIAGNOSIS — I71.40 AAA (ABDOMINAL AORTIC ANEURYSM) WITHOUT RUPTURE: Primary | ICD-10-CM

## 2022-01-10 PROCEDURE — 99214 OFFICE O/P EST MOD 30 MIN: CPT | Performed by: NURSE PRACTITIONER

## 2022-01-10 PROCEDURE — 93280 PM DEVICE PROGR EVAL DUAL: CPT | Performed by: NURSE PRACTITIONER

## 2022-01-10 NOTE — PROGRESS NOTES
[facility], [place]. [address], [place] [address] [address] [facility], [facility]  [phone] office  [phone] fax      OFFICE VISIT:  1/10/2022    Sharath Grossman - : 1950  Reason For Visit:  Janee Tejada is a 66 y.o. male who is here for Follow-Up from Hospital (1 month post new pacemaker, patient denies any cardiac symptoms or problems with pacemaker) and Coronary Artery Disease    History:   Diagnosis Orders   1. Coronary artery disease involving native coronary artery of native heart without angina pectoris     2. Chronic systolic CHF (congestive heart failure) ([address]     3. Mixed hyperlipidemia     4. Essential hypertension     5. Hx of sinoatrial node dysfunction     6. Pacemaker       The patient presents today for cardiology follow up and pacemaker interrogation. The patient reports that he is doing very well. He has not experienced angina or issues with uncontrolled blood pressure or fluid retention. The patient denies symptoms to suggest myocardial ischemia, heart failure or arrhythmia. BP is well controlled on current regimen. The patient's PCP monitors cholesterol. Subjective  Renzo denies exertional chest pain, shortness of breath, orthopnea, paroxysmal nocturnal dyspnea, syncope, presyncope, sensed arrhythmia, edema and fatigue. The patient denies numbness or weakness to suggest cerebrovascular accident or transient ischemic attack.       Sharath Grossman has the following history as recorded in [facility]:  Patient Active Problem List   Diagnosis Code    Gout M10.9    Anxiety F41.9    Mixed hyperlipidemia E78.2    Neuropathy G62.9    Benign nodular prostatic hyperplasia without lower urinary tract symptoms N40.0    Peripheral vascular disease (HCC) I73.9    Tinea pedis of left foot B35.3    Idiopathic peripheral neuropathy G60.9    Hearing aid worn Z97.4    Acute pain of right shoulder M25.511    NSTEMI (non-ST elevated myocardial infarction) ([facility] I21.4    History of acute congestive heart failure Z86.79    AAA (abdominal aortic aneurysm) without rupture ([facility]) I71.4    Chronic systolic CHF (congestive heart failure) ([facility]) I50.22    Coronary artery disease involving native coronary artery of native heart without angina pectoris I25.10    Hypotension due to drugs I95.2    Open toe wound S91.109A    History of amputation of lesser toe of right foot ([facility] Z89.421     Past Medical History:   Diagnosis Date    AAA (abdominal aortic aneurysm) ([facility])     CAD (coronary artery disease)     CARDIAC STENTS X 4    CHF (congestive heart failure) ([facility])     Gout     History of acute congestive heart failure     Hyperlipidemia     Hypertension     Neuropathy involving both lower extremities      Past Surgical History:   Procedure Laterality Date    CARDIAC CATHETERIZATION  2019    HERNIA REPAIR      TOE AMPUTATION Right 4/15/2021    RIGHT THIRD TOE AMPUTATION performed by Frederick España DO at [facility] OR     Family History   Problem Relation Age of Onset    Heart Surgery Brother     Heart Disease Brother      Social History     Tobacco Use    Smoking status: Former Smoker     Packs/day: 1.00     Types: Cigarettes     Quit date: 1980     Years since quittin.6    Smokeless tobacco: Never Used    Tobacco comment: quit over 25 yrs ago   Substance Use Topics    Alcohol use: No      Current Outpatient Medications   Medication Sig Dispense Refill    ENTRESTO 24-26 MG per tablet TAKE 1 TABLET BY MOUTH TWICE A  tablet 0    atorvastatin (LIPITOR) 40 MG tablet TAKE 1 TABLET BY MOUTH EVERY DAY AT NIGHT 90 tablet 2    pentoxifylline (TRENTAL) 400 MG extended release tablet TAKE 1 TABLET BY MOUTH 3 TIMES DAILY WITH MEALS. 270 tablet 1    carvedilol (COREG) 6.25 MG tablet Take 1 tablet by mouth 2 times daily (with meals) 60 tablet 3    clopidogrel (PLAVIX) 75 MG tablet Take 1 tablet by mouth daily 30 tablet 3    Multiple Vitamins-Minerals (MULTIVITAMIN ADULT, MINERALS,) TABS Take 1 tablet by mouth daily 90 tablet 3    aspirin 81 MG chewable tablet TAKE 1 TABLET BY MOUTH EVERY DAY 90 tablet 3    furosemide (LASIX) 20 MG tablet Take 1 tablet by mouth daily 90 tablet 3    nitroGLYCERIN (NITROSTAT) 0.4 MG SL tablet up to max of 3 total doses. If no relief after 1 dose, call 911. 25 tablet 1    sildenafil (VIAGRA) 100 MG tablet TAKE 1 TABLET BY MOUTH AS NEEDED FOR ERECTILE DYSFUNCTION 30 tablet 5     No current facility-administered medications for this visit. Allergies: Penicillins    Review of Systems  Constitutional - no appetite change, or unexpected weight change. No fever, chills or diaphoresis. No significant change in activity level or new onset of fatigue. HEENT - no significant rhinorrhea or epistaxis. No tinnitus or significant hearing loss. Eyes - no sudden vision change or amaurosis. No corneal arcus, xantholasma, subconjunctival hemorrhage or discharge. Respiratory - no significant wheezing, stridor, apnea or cough. No dyspnea on exertion or shortness of air. Cardiovascular - no exertional chest pain to suggest myocardial ischemia. No orthopnea or PND. No sensation of sustained arrythmia. No occurrence of slow heart rate. No palpitations. No claudication. Gastrointestinal - no abdominal swelling or pain. No blood in stool. No severe constipation, diarrhea, nausea, or vomiting. Genitourinary - no dysuria, frequency, or urgency. No flank pain or hematuria. Musculoskeletal - no back pain or myalgia. No problems with gait. Extremities - no clubbing, cyanosis or extremity edema. Skin - no color change or rash. No pallor. No new surgical incision. Neurologic - no speech difficulty, facial asymmetry or lateralizing weakness. No seizures, presyncope or syncope. No significant dizziness. Hematologic - no easy bruising or excessive bleeding. Psychiatric - no severe anxiety or insomnia.   No confusion. All other review of systems are negative. Objective  Vital Signs - /80   Pulse 70   Ht 6' 3\" (1.905 m)   Wt 249 lb (112.9 kg)   BMI 31.12 kg/m²   General - Renzo is alert, cooperative, and pleasant. Well groomed. No acute distress. Body habitus - Body mass index is 31.12 kg/m². HEENT - Head is normocephalic. No circumoral cyanosis. Dentition is normal.  EYES -   Lids normal without ptosis. No discharge, edema or subconjunctival hemorrhage. Neck - Symmetrical without apparent mass or lymphadenopathy. Respiratory - Normal respiratory effort without use of accessory muscles. Ausculatation reveals vesicular breath sounds without crackles, wheezes, rub or rhonchi. Cardiovascular - No jugular venous distention. Auscultation reveals regular rate and rhythm. No audible clicks, gallop or rub. No murmur. No lower extremity varicosities. No carotid bruits. Abdominal -  No visible distention, mass or pulsations. Extremities - No clubbing or cyanosis. No statis dermatitis or ulcers. No edema. Musculoskeletal -   No Osler's nodes. No kyphosis or scoliosis. Gait is even and regular without limp or shuffle. Ambulates without assistance. Skin -  Warm and dry; no rash or pallor. Pacermaker implanted per left upper chest.  Incision well healed. No overlying erythema, edema or drainage. Neurological - No focal neurological deficits. Thought processes coherent. No apparent tremor. Oriented to person, place and time. Psychiatric -  Appropriate affect and mood. Data reviewed:  11/7/21 echo  LV is moderate to severely dilated with moderately depressed LV systolic  function. LV ejection fraction estimated at 40 to 45%. Grade 2 diastolic dysfunction. Mild concentric LVH. RV appears normal in size with normal systolic function. Mild left atrial enlargement. Normal right atrial size. Aortic valve is trileaflet with normal leaflet mobility.  No stenosis or significant regurgitation. Mitral valve is mildly thickened with normal leaflet mobility. Trace  mitral regurgitation. No stenosis. No significant pericardial effusion. Pacer wires noted in right-sided chambers. Signature   Electronically signed by Staci Layne MD(Interpreting physician) on 11/09/2021 10:05 AM    5/18/19 cath - Dr. Pat Cliffordad     Severe double vessel disease with subacute thrombotic occlusion of  proximal circumflex with faint distal collaterals and diffuse proximal mid  and distal LAD disease. Severe LV dysfunction with moderate left ventricular dilation and markedly  elevated LV end-diastolic pressures. Successful PCI to proximal circumflex, proximal mid and distal LAD with  drug-eluting stents. Recommendations    Medical management   Heart failure management   Reevaluate LV function in 3 months with consideration for ICD if ejection  fraction less than 35%. Plavix to remain on uninterrupted for 1 year and would keep on Plavix  lifelong.     Signatures   Electronically signed by Staci Layne MD(Performing Physician) on  05/20/2019 10:48    Lab Results   Component Value Date    WBC 8.0 11/09/2021    HGB 12.4 (L) 11/09/2021    HCT 37.2 (L) 11/09/2021    MCV 96.1 (H) 11/09/2021     11/09/2021     Lab Results   Component Value Date     11/09/2021    K 4.0 11/09/2021     11/09/2021    CO2 20 (L) 11/09/2021    BUN 20 11/09/2021    CREATININE 1.0 11/09/2021    GLUCOSE 97 11/09/2021    CALCIUM 8.6 (L) 11/09/2021    PROT 7.0 11/07/2021    LABALBU 4.3 11/07/2021    BILITOT 0.6 11/07/2021    ALKPHOS 81 11/07/2021    AST 20 11/07/2021    ALT 28 11/07/2021    LABGLOM >60 11/09/2021    GFRAA >59 11/09/2021       Lab Results   Component Value Date    CHOL 136 (L) 09/22/2021    CHOL 113 (L) 07/17/2020    CHOL 165 05/19/2019     Lab Results   Component Value Date    TRIG 123 09/22/2021    TRIG 103 07/17/2020    TRIG 186 (H) 05/19/2019     Lab Results   Component Value Date    HDL 51 (L) 09/22/2021    HDL 47 (L) 07/17/2020    HDL 42 (L) 05/19/2019     Lab Results   Component Value Date    LDLCHOLESTEROL 115 10/03/2017    LDLCHOLESTEROL 119 (H) 12/12/2016    LDLCALC 60 09/22/2021    LDLCALC 45 07/17/2020    LDLCALC 86 05/19/2019     BP Readings from Last 3 Encounters:   01/10/22 134/80   11/09/21 (!) 150/74   09/29/21 136/78    Pulse Readings from Last 3 Encounters:   01/10/22 70   11/09/21 94   09/29/21 69        Wt Readings from Last 3 Encounters:   01/10/22 249 lb (112.9 kg)   11/09/21 245 lb 8 oz (111.4 kg)   09/29/21 250 lb (113.4 kg)     Assessment/Plan:   Diagnosis Orders   1. Chronic systolic CHF (congestive heart failure) ([address]     2. Coronary artery disease involving native coronary artery of native heart without angina pectoris     3. Mixed hyperlipidemia     4. Essential hypertension     5. Hx of sinoatrial node dysfunction     6. Pacemaker       Pacer check:  Pacemaker check showed adequate battery status. 11.5 years estimated longevity. Mode: DDDR. Lead impedances are stable. Pacing:  AP 52/2%;  96.9%. Reprogramming for sensitivity and threshold testing. Appropriate diagnostics and safety margins noted. A output 0.50 V at 0.40 ms; P wave 2.4 mV. V output 1.00 V at 0.4o ms, R wave 10.8 mV. Sustained arrythmia:  none. Office check 3 months - does not have remote monitoring. Stable CV status without overt heart failure, sensed arrhythmia or angina. CAD - stable on current medical management to include Coreg, Plavix and Lipitor. Chronic systolic heart failure - NYHA class II, stage C- EF  45% 2020 -  appears euvolemic on guideline directed medical therapy with carvedilol and Entresto. HTN - normotensive on current regimen. BP goal less than 130/80. Continue same. Hyperlipidemia - . Continue Lipitor 40 mg daily. Labs are followed by PCP.     Pacer - interrogation shows adequate battery status, diagnostics and safety margins.     Patient is compliant with medication regimen. Previous cardiac history and records reviewed. Continue current medical management for cardiac related condition. Continue other current medications as directed. Continue to follow up with primary care provider for non cardiac medical problems. [place] [facility] please request that your labs be faxed to this office at [phone]. BP goal 130/80 or less. Call the office with any problems, questions or concerns at [phone]. Cardiology follow up as scheduled in [address] appointments. Educational included in patient instructions. Heart health.       Olga Lidia Valdovinos, APRN

## 2022-01-10 NOTE — PATIENT INSTRUCTIONS
New instructions for today:  Weigh yourself daily without clothing at the same time each day. Record a daily weight log. Call the office if you gain 3 pounds or more in 2 to 3 days or 5 pounds in one week. A sudden weight gain may mean that your heart failure is getting worse. Sodium causes your body to hold on to extra water. This may cause your heart failure symptoms to get worse. Limit sodium to 2,000 milligrams (mg) a day or less. That is less than 1 teaspoon of salt a day, including all the salt you eat in cooking or in packaged foods. Fluid restriction of 1500ml per day (about 6 cups of fluid per day). Patient Instructions:  Continue current medications as prescribed. Always keep a current medication list. Bring your medications to every office visit. Continue to follow up with primary care provider for non cardiac medical problems. Call the office with any problems, questions or concerns at [phone]. If you have been asked to keep a blood pressure log, do so for 2 weeks. Call the office to report readings to the triage nurse at [phone]. Follow up with cardiologist as scheduled. The following educational material has been included in this after visit summary for your review: Life simple 7. Heart health. Life simple 7  1) Manage blood pressure - high blood pressure is a major risk factor for heart disease and stroke. Keeping blood pressure in health range reduces strain on your heart, arteries and kidneys. Blood pressure goal is less than 130/80. 2) Control cholesterol - contributes to plaque, which can clog arteries and lead to heart disease and stroke. When you control your cholesterol you are giving your arteries their best chance to remain clear. It is recommended that you get cholesterol lab work done once a year. 3) Reduce blood sugar - most of the food we eat is turning into glucose or blood sugar that our body uses for energy.   Over time, high levels of blood sugar can damage your heart, kidneys, eyes and nerves. 4) Get active - living an active life is one of the most rewarding gifts you can give yourself and those you love. Simply put, daily physical activity increases your length and quality of life. Strive to exercise 15 minutes most days of the week. 5)  Eat better - A healthy diet is one of your best weapons for fighting cardiovascular disease. When you eat a heart healthy diet, you improve your chances for feeling good and staying healthy for life. 6)  Lose weight - when you shed extra fat an unnecessary pounds, you reduce the burden on your hear, lungs, blood vessels and skeleton. You give yourself the gift of active living, you lower your blood pressure and help yourself feel better. 7) Stop smoking - cigarette smokers have a higher risk of developing cardiovascular disease. If  You smoke, quitting is the best thing you can do for your health. Check American Heart Association on line for more information on Life's Simple 7 and tips for healthy living. A Healthy Heart: Care Instructions  Your Care Instructions     Coronary artery disease, also called heart disease, occurs when a substance called plaque builds up in the vessels that supply oxygen-rich blood to your heart muscle. This can narrow the blood vessels and reduce blood flow. A heart attack happens when blood flow is completely blocked. A high-fat diet, smoking, and other factors increase the risk of heart disease. Your doctor has found that you have a chance of having heart disease. You can do lots of things to keep your heart healthy. It may not be easy, but you can change your diet, exercise more, and quit smoking. These steps really work to lower your chance of heart disease. Follow-up care is a key part of your treatment and safety. Be sure to make and go to all appointments, and call your doctor if you are having problems.  It's also a good idea to know your test results and keep a list of the medicines you take. How can you care for yourself at home? Diet  · Use less salt when you cook and eat. This helps lower your blood pressure. Taste food before salting. Add only a little salt when you think you need it. With time, your taste buds will adjust to less salt. · Eat fewer snack items, fast foods, canned soups, and other high-salt, high-fat, processed foods. · Read food labels and try to avoid saturated and trans fats. They increase your risk of heart disease by raising cholesterol levels. · Limit the amount of solid fat-butter, margarine, and shortening-you eat. Use olive, peanut, or canola oil when you cook. Bake, broil, and steam foods instead of frying them. · Eat a variety of fruit and vegetables every day. Dark green, deep orange, red, or yellow fruits and vegetables are especially good for you. Examples include spinach, carrots, peaches, and berries. · Foods high in fiber can reduce your cholesterol and provide important vitamins and minerals. High-fiber foods include whole-grain cereals and breads, oatmeal, beans, brown rice, citrus fruits, and apples. · Eat lean proteins. Heart-healthy proteins include seafood, lean meats and poultry, eggs, beans, peas, nuts, seeds, and soy products. · Limit drinks and foods with added sugar. These include candy, desserts, and soda pop. Lifestyle changes  · If your doctor recommends it, get more exercise. Walking is a good choice. Bit by bit, increase the amount you walk every day. Try for at least 30 minutes on most days of the week. You also may want to swim, bike, or do other activities. · Do not smoke. If you need help quitting, talk to your doctor about stop-smoking programs and medicines. These can increase your chances of quitting for good. Quitting smoking may be the most important step you can take to protect your heart. It is never too late to quit. · Limit alcohol to 2 drinks a day for men and 1 drink a day for women.  Too much alcohol can cause health problems. · Manage other health problems such as diabetes, high blood pressure, and high cholesterol. If you think you may have a problem with alcohol or drug use, talk to your doctor. Medicines  · Take your medicines exactly as prescribed. Call your doctor if you think you are having a problem with your medicine. · If your doctor recommends aspirin, take the amount directed each day. Make sure you take aspirin and not another kind of pain reliever, such as acetaminophen (Tylenol). When should you call for help? [id number] if you have symptoms of a heart attack. These may include:  · Chest pain or pressure, or a strange feeling in the chest.  · Sweating. · Shortness of breath. · Pain, pressure, or a strange feeling in the back, neck, jaw, or upper belly or in one or both shoulders or arms. · Lightheadedness or sudden weakness. · A fast or irregular heartbeat. After you call 911, the  may tell you to chew 1 adult-strength or 2 to 4 low-dose aspirin. Wait for an ambulance. Do not try to drive yourself. Watch closely for changes in your health, and be sure to contact your doctor if you have any problems. Where can you learn more? Go to https://[employer].. org and sign in to your [employer] account. Enter L844 in the [facility] box to learn more about \"A Healthy Heart: Care Instructions. \"     If you do not have an account, please click on the \"Sign Up Now\" link. Current as of: December 16, 2019               Content Version: 12.5  © 2934-1328 [employer], [employer]. Care instructions adapted under license by [facility] ([facility]). If you have questions about a medical condition or this instruction, always ask your healthcare professional. [address] any warranty or liability for your use of this information.

## 2022-01-18 ENCOUNTER — OFFICE VISIT (OUTPATIENT)
Dept: VASCULAR SURGERY | Age: 72
End: 2022-01-18
Payer: COMMERCIAL

## 2022-01-18 ENCOUNTER — HOSPITAL ENCOUNTER (OUTPATIENT)
Dept: ULTRASOUND IMAGING | Age: 72
Discharge: HOME | End: 2022-01-18
Payer: COMMERCIAL

## 2022-01-18 VITALS
OXYGEN SATURATION: 98 % | TEMPERATURE: 98.2 F | SYSTOLIC BLOOD PRESSURE: 138 MMHG | BODY MASS INDEX: 31.12 KG/M2 | HEIGHT: 75 IN | DIASTOLIC BLOOD PRESSURE: 68 MMHG | HEART RATE: 75 BPM

## 2022-01-18 DIAGNOSIS — I71.40 AAA (ABDOMINAL AORTIC ANEURYSM) WITHOUT RUPTURE: ICD-10-CM

## 2022-01-18 DIAGNOSIS — I71.40 AAA (ABDOMINAL AORTIC ANEURYSM) WITHOUT RUPTURE: Primary | ICD-10-CM

## 2022-01-18 PROCEDURE — 99213 OFFICE O/P EST LOW 20 MIN: CPT | Performed by: NURSE PRACTITIONER

## 2022-01-18 PROCEDURE — 93978 VASCULAR STUDY: CPT

## 2022-01-18 PROCEDURE — 3017F COLORECTAL CA SCREEN DOC REV: CPT | Performed by: NURSE PRACTITIONER

## 2022-01-18 PROCEDURE — 4040F PNEUMOC VAC/ADMIN/RCVD: CPT | Performed by: NURSE PRACTITIONER

## 2022-01-18 PROCEDURE — 1036F TOBACCO NON-USER: CPT | Performed by: NURSE PRACTITIONER

## 2022-01-18 PROCEDURE — 1123F ACP DISCUSS/DSCN MKR DOCD: CPT | Performed by: NURSE PRACTITIONER

## 2022-01-18 PROCEDURE — G8427 DOCREV CUR MEDS BY ELIG CLIN: HCPCS | Performed by: NURSE PRACTITIONER

## 2022-01-18 PROCEDURE — G8482 FLU IMMUNIZE ORDER/ADMIN: HCPCS | Performed by: NURSE PRACTITIONER

## 2022-01-18 PROCEDURE — G8417 CALC BMI ABV UP PARAM F/U: HCPCS | Performed by: NURSE PRACTITIONER

## 2022-01-18 NOTE — PROGRESS NOTES
Loreto Espana (:  1950) is a 66 y.o. male,Established patient, here for evaluation of the following chief complaint(s):  Results (Aorta US follow up )            SUBJECTIVE/OBJECTIVE:  He has a known history of abdominal aortic aneurysm for 1 - 5 years. He has not had abdominal pain. He has not had back pain in the cervical spine, thoracic spine, lumbar spine and sacral spine region. This pain is unchanged since the last visit. Pain is rated as 0.          Loreto Espana is a 66 y.o. male with the following history as [facility] in [facility]:  Patient Active Problem List    Diagnosis Date Noted    History of amputation of lesser toe of right foot ([facility] 2021    Open toe wound 2019    Chronic systolic CHF (congestive heart failure) ([facility] 2019    Coronary artery disease involving native coronary artery of native heart without angina pectoris 2019    Hypotension due to drugs 2019    AAA [facility]abdominal aortic aneurysm) without rupture ([facility]) 2019    History of acute congestive heart failure     [facility] elevated [facility]    Acute pain of right shoulder 2019    Hearing aid worn 10/03/2017    Idiopathic peripheral neuropathy 2016    Benign nodular prostatic hyperplasia without lower urinary tract symptoms 2016    Peripheral vascular disease ([facility] 2016    Tinea pedis of left foot 2016    Neuropathy 03/15/2016    Gout 2014    Anxiety 2014    Mixed hyperlipidemia 2014     Current Outpatient Medications   Medication Sig Dispense Refill    ENTRESTO 24-26 MG per tablet TAKE 1 TABLET BY MOUTH TWICE A  tablet 0    atorvastatin (LIPITOR) 40 MG tablet TAKE 1 TABLET BY MOUTH EVERY DAY AT NIGHT 90 tablet 2    pentoxifylline (TRENTAL) 400 MG extended release tablet TAKE 1 TABLET BY MOUTH 3 TIMES DAILY WITH MEALS. 270 tablet 1    carvedilol (COREG) 6.25 MG tablet Take 1 tablet by mouth 2 times daily (with meals) 60 tablet 3    clopidogrel (PLAVIX) 75 MG tablet Take 1 tablet by mouth daily 30 tablet 3    Multiple Vitamins-Minerals (MULTIVITAMIN ADULT, MINERALS,) TABS Take 1 tablet by mouth daily 90 tablet 3    aspirin 81 MG chewable tablet TAKE 1 TABLET BY MOUTH EVERY DAY 90 tablet 3    furosemide (LASIX) 20 MG tablet Take 1 tablet by mouth daily 90 tablet 3    nitroGLYCERIN (NITROSTAT) 0.4 MG SL tablet up to max of 3 total doses. If no relief after 1 dose, call 911. 25 tablet 1    sildenafil (VIAGRA) 100 MG tablet TAKE 1 TABLET BY MOUTH AS NEEDED FOR ERECTILE DYSFUNCTION 30 tablet 5     No current facility-administered medications for this visit. Allergies: Penicillins  Past Medical History:   Diagnosis Date    [facility]) [facility]     CAD (coronary artery disease)     CARDIAC STENTS X 4    CHF (congestive heart failure) ([facility])     Gout     History of acute congestive heart failure     Hyperlipidemia     Hypertension     Neuropathy involving both lower extremities      Past Surgical History:   Procedure Laterality Date    CARDIAC CATHETERIZATION  2019    HERNIA REPAIR      TOE AMPUTATION Right 4/15/2021    RIGHT THIRD TOE AMPUTATION performed by Driss Leung DO at [facility] OR     Family History   Problem Relation Age of Onset    Heart Surgery Brother     Heart Disease Brother      Social History     Tobacco Use    Smoking status: Former Smoker     Packs/day: 1.00     Types: Cigarettes     Quit date: 1980     Years since quittin.6    Smokeless tobacco: Never Used    Tobacco comment: quit over 25 yrs ago   Substance Use Topics    Alcohol use: No       ROS  Eyes - no sudden vision change or amaurosis. Respiratory - no significant shortness of breath,  Cardiovascular - no chest pain or syncope. No  significant leg swelling. no claudication. Musculoskeletal - no gait disturbance  Skin - no new wound.   Neurologic -  No speech difficulty or lateralizing weakness. All other review of systems are negative. Physical Exam    /68 (Site: Right Upper Arm)   Pulse 75   Temp 98.2 °F (36.8 °C)   Ht 6' 3\" (1.905 m)   SpO2 98%   BMI 31.12 kg/m²       Neck- carotid pulses 2+ to palpation with no bruit  Cardiovascular - Regular rate and rhythm. Pulmonary - effort appears normal.  No respiratory distress. Lungs - Breath sounds normal. No wheezes or rales. GI - Abdomen - soft, non tender, bowel sounds X 4 quadrants. No guarding or rebound tenderness. No distension or palpable mass. Extremities -  Radial and brachial pulses are 2+ to palpation bilaterally. Right femoral pulse: present 2+; Right popliteal pulse: absent Right DP: absent; Right PT absent; Left femoral pulse: present 2+; Left popliteal pulse: absent; Left DP: absent; Left PT: absent No cyanosis, clubbing, or significant edema. No signs atheroembolic event. Neurologic - alert and oriented X 3. Physiologic. Face symmetric. Skin - warm, dry, and intact. no wound  Psychiatric - mood, affect, and behavior appear normal.  Judgment and thought processes appear normal.    Aortic ultrasound:  3.0 cm abdominal aortic aneurysm. This aneurysm has not changed since the last visit. Disease process is stable and chronic  Individual films reviewed:  Yes. These results have been reviewed with the patient. Reviewed previous studies including: aortic u/s  Individual images were reviewed. I agree with the findings  Results were discussed with the patient. ASSESSMENT/PLAN:  1. AAA (abdominal aortic aneurysm) without rupture (HCC)        Strongly encourage start/continue statin therapy -   Recommend no smoking   Proceed with 24 months with ultrasound        Symptoms of rupture reviewed with the patient including sudden onset severe back pain or abdominal pain. This pain can sometimes radiate into the groin or leg.   The patient may experience a feeling of impending doom or death.  If this occurs they have been insturcted to call 911 and get to the emergency room telling them you have an aneurysm. Patient has voiced understanding. An electronic signature was used to authenticate this note.     --Emile Hammond, APRN

## 2022-01-21 ENCOUNTER — TELEPHONE (OUTPATIENT)
Dept: PRIMARY CARE CLINIC | Age: 72
End: 2022-01-21

## 2022-01-26 ENCOUNTER — TELEPHONE (OUTPATIENT)
Dept: CARDIOLOGY CLINIC | Age: 72
End: 2022-01-26

## 2022-01-26 NOTE — TELEPHONE ENCOUNTER
30[address] [facility] to cardiac risk stratify and forward the letter to Dr. Mynor Wayne.   Thanks, [employer]

## 2022-01-26 NOTE — TELEPHONE ENCOUNTER
Date: TBD     Cardiologist: Dr. Maggie Coburn     Procedure: Cataract Surgery     Surgeon: Uri Lama Visit:1/10/22     Reason for office visit and medical concerns addressed at this office visit: CAD, CHF, PVD, AAA, HTN, hyperlipidemia     Testing Performed and Date of Service:  EKG 4-13-21  Echo 6-17-20     RCRI = 2 pts, elevated, 6.6%   METs 4     Current Medications: lasix, nitro, lipitor, viagra, trental, entresto, coreg, ASA, omega 3     Is the patient currently taking an anticoagulant? If so, what is the diagnosis the patient has been given to warrant the need for the anticoagulant?  ASA     Additional Notes: Cardiac Risk Request

## 2022-02-08 RX ORDER — SACUBITRIL AND VALSARTAN 24; 26 MG/1; MG/1
TABLET, FILM COATED ORAL
Qty: 180 TABLET | Refills: 3 | Status: SHIPPED | OUTPATIENT
Start: 2022-02-08 | End: 2022-05-19 | Stop reason: SDUPTHER

## 2022-03-29 ENCOUNTER — OFFICE VISIT (OUTPATIENT)
Dept: CARDIOLOGY CLINIC | Age: 72
End: 2022-03-29
Payer: COMMERCIAL

## 2022-03-29 VITALS
HEART RATE: 65 BPM | SYSTOLIC BLOOD PRESSURE: 106 MMHG | DIASTOLIC BLOOD PRESSURE: 72 MMHG | WEIGHT: 248 LBS | BODY MASS INDEX: 30.84 KG/M2 | HEIGHT: 75 IN

## 2022-03-29 DIAGNOSIS — E78.2 MIXED HYPERLIPIDEMIA: ICD-10-CM

## 2022-03-29 DIAGNOSIS — I10 ESSENTIAL HYPERTENSION: ICD-10-CM

## 2022-03-29 DIAGNOSIS — I25.10 CORONARY ARTERY DISEASE INVOLVING NATIVE CORONARY ARTERY OF NATIVE HEART WITHOUT ANGINA PECTORIS: Primary | ICD-10-CM

## 2022-03-29 DIAGNOSIS — Z95.0 PACEMAKER: ICD-10-CM

## 2022-03-29 DIAGNOSIS — I44.2 CHB (COMPLETE HEART BLOCK) (HCC): ICD-10-CM

## 2022-03-29 DIAGNOSIS — I73.9 PERIPHERAL VASCULAR DISEASE (HCC): ICD-10-CM

## 2022-03-29 DIAGNOSIS — I50.22 CHRONIC SYSTOLIC CHF (CONGESTIVE HEART FAILURE) (HCC): ICD-10-CM

## 2022-03-29 PROCEDURE — 1123F ACP DISCUSS/DSCN MKR DOCD: CPT | Performed by: CLINICAL NURSE SPECIALIST

## 2022-03-29 PROCEDURE — 99214 OFFICE O/P EST MOD 30 MIN: CPT | Performed by: CLINICAL NURSE SPECIALIST

## 2022-03-29 PROCEDURE — 3017F COLORECTAL CA SCREEN DOC REV: CPT | Performed by: CLINICAL NURSE SPECIALIST

## 2022-03-29 PROCEDURE — 93280 PM DEVICE PROGR EVAL DUAL: CPT | Performed by: CLINICAL NURSE SPECIALIST

## 2022-03-29 PROCEDURE — G8482 FLU IMMUNIZE ORDER/ADMIN: HCPCS | Performed by: CLINICAL NURSE SPECIALIST

## 2022-03-29 PROCEDURE — 4040F PNEUMOC VAC/ADMIN/RCVD: CPT | Performed by: CLINICAL NURSE SPECIALIST

## 2022-03-29 PROCEDURE — 1036F TOBACCO NON-USER: CPT | Performed by: CLINICAL NURSE SPECIALIST

## 2022-03-29 PROCEDURE — G8427 DOCREV CUR MEDS BY ELIG CLIN: HCPCS | Performed by: CLINICAL NURSE SPECIALIST

## 2022-03-29 PROCEDURE — G8417 CALC BMI ABV UP PARAM F/U: HCPCS | Performed by: CLINICAL NURSE SPECIALIST

## 2022-03-29 ASSESSMENT — ENCOUNTER SYMPTOMS
WHEEZING: 0
CHEST TIGHTNESS: 0
FACIAL SWELLING: 0
VOMITING: 0
ABDOMINAL PAIN: 0
SHORTNESS OF BREATH: 1
NAUSEA: 0
COUGH: 0
EYE REDNESS: 0

## 2022-03-29 NOTE — PROGRESS NOTES
[facility], [address] [place] [address]  Phone: [phone]  Fax: [phone]    OFFICE VISIT:  3/29/2022    Nena Zuniga - : 1950    Reason For Visit:  Jimi Stinson is a 66 y.o. male who is here for 6 Month Follow-Up and Coronary Artery Disease     Diagnosis Orders   1. Coronary artery disease involving native coronary artery of native heart without angina pectoris     2. Chronic systolic CHF (congestive heart failure) ([address]     3. Essential hypertension     4. Mixed hyperlipidemia     5. CHB (complete heart block) ([facility])     6. Pacemaker     7. Peripheral vascular disease ([address]        HPI  Patient presented to the hospital on 2019 with acute decompensated heart failure, non-STEMI. Cath showed EF of 30 to 35% with PCI to the proximal circumflex, mid and distal LAD. EF improved to 35-40% in Aug 2019, and EF 45% . In 2021, patient had syncopal episode with complete heart block, ended up with a pacemaker, heart cath with PCI with drug-eluting stent to the distal RCA. EF per echo 40 to 45%    History of amputation of toes due to PVD and follows with vascular surgery. He denies chest pain. He has some mild shortness of breath, but denies orthopnea , PND, edema, or sudden weight gain. Patient continues to work part-time as a  and tolerates this well. Quirino Rich MD is PCP.   Nena Zuniga has the following history as recorded in [facility]:    Patient Active Problem List    Diagnosis Date Noted    History of amputation of lesser toe of right foot ([address] 2021    Open toe wound 2019    Chronic systolic CHF (congestive heart failure) ([address] 2019    Coronary artery disease involving native coronary artery of native heart without angina pectoris 2019    Hypotension due to drugs 2019    AAA (abdominal aortic aneurysm) without rupture ([address] 2019    History of acute congestive heart failure  NSTEMI (non-ST elevated myocardial infarction) ([facility] 2019    Acute pain of right shoulder 2019    Hearing aid worn 10/03/2017    Idiopathic peripheral neuropathy 2016    Benign nodular prostatic hyperplasia without lower urinary tract symptoms 2016    Peripheral vascular disease ([facility] 2016    Tinea pedis of left foot 2016    Neuropathy 03/15/2016    Gout 2014    Anxiety 2014    Mixed hyperlipidemia 2014     Past Medical History:   Diagnosis Date    AAA (abdominal aortic aneurysm) ([facility]     CAD (coronary artery disease)     CARDIAC STENTS X 4    CHF (congestive heart failure) ([facility]     Gout     History of acute congestive heart failure     Hyperlipidemia     Hypertension     Neuropathy involving both lower extremities      Past Surgical History:   Procedure Laterality Date    CARDIAC CATHETERIZATION  2019    CATARACT REMOVAL  03/15/2022    right eye    HERNIA REPAIR      TOE AMPUTATION Right 4/15/2021    RIGHT THIRD TOE AMPUTATION performed by Bharati Bobby DO at [facility] OR     Family History   Problem Relation Age of Onset    Heart Surgery Brother     Heart Disease Brother      Social History     Tobacco Use    Smoking status: Former Smoker     Packs/day: 1.00     Types: Cigarettes     Quit date: 1980     Years since quittin.8    Smokeless tobacco: Never Used    Tobacco comment: quit over 25 yrs ago   Substance Use Topics    Alcohol use: No      Current Outpatient Medications   Medication Sig Dispense Refill    ENTRESTO 24-26 MG per tablet TAKE 1 TABLET BY MOUTH TWICE A  tablet 3    atorvastatin (LIPITOR) 40 MG tablet TAKE 1 TABLET BY MOUTH EVERY DAY AT NIGHT 90 tablet 2    pentoxifylline (TRENTAL) 400 MG extended release tablet TAKE 1 TABLET BY MOUTH 3 TIMES DAILY WITH MEALS. 270 tablet 1    carvedilol (COREG) 6.25 MG tablet Take 1 tablet by mouth 2 times daily (with meals) 60 tablet 3    clopidogrel (PLAVIX) 75 MG tablet Take 1 tablet by mouth daily 30 tablet 3    Multiple Vitamins-Minerals (MULTIVITAMIN ADULT, MINERALS,) TABS Take 1 tablet by mouth daily 90 tablet 3    aspirin 81 MG chewable tablet TAKE 1 TABLET BY MOUTH EVERY DAY 90 tablet 3    furosemide (LASIX) 20 MG tablet Take 1 tablet by mouth daily 90 tablet 3    nitroGLYCERIN (NITROSTAT) 0.4 MG SL tablet up to max of 3 total doses. If no relief after 1 dose, call 911. 25 tablet 1    sildenafil (VIAGRA) 100 MG tablet TAKE 1 TABLET BY MOUTH AS NEEDED FOR ERECTILE DYSFUNCTION 30 tablet 5     No current facility-administered medications for this visit. Allergies: Penicillins    Review of Systems  Review of Systems   Constitutional: Negative for activity change, diaphoresis, fatigue, fever and unexpected weight change. HENT: Negative for facial swelling and nosebleeds. Eyes: Negative for redness and visual disturbance. Respiratory: Positive for shortness of breath (mild, unchanged). Negative for cough, chest tightness and wheezing. Cardiovascular: Negative for chest pain, palpitations and leg swelling. Gastrointestinal: Negative for abdominal pain, nausea and vomiting. Endocrine: Negative for cold intolerance and heat intolerance. Genitourinary: Negative for dysuria and hematuria. Musculoskeletal: Negative for arthralgias and myalgias. Skin: Negative for pallor and rash. Neurological: Negative for dizziness, seizures, syncope, weakness and light-headedness. Hematological: Does not bruise/bleed easily. Psychiatric/Behavioral: Negative for agitation. The patient is not nervous/anxious.         Objective  Vital Signs - /72   Pulse 65   Ht 6' 3\" (1.905 m)   Wt 248 lb (112.5 kg)   BMI 31.00 kg/m²    Wt Readings from Last 3 Encounters:   03/29/22 248 lb (112.5 kg)   01/10/22 249 lb (112.9 kg)   11/09/21 245 lb 8 oz (111.4 kg)        BP Readings from Last 3 Encounters:   03/29/22 106/72   01/18/22 138/68   01/10/22 134/80    Pulse Readings from Last 3 Encounters:   03/29/22 65   01/18/22 75   01/10/22 70        Physical Exam  Vitals and nursing note reviewed. Constitutional:       General: He is not in acute distress. Appearance: Normal appearance. He is well-developed. HENT:      Head: Normocephalic and atraumatic. Right Ear: Hearing and external ear normal.      Left Ear: Hearing and external ear normal.      Nose: Nose normal.   Eyes:      General:         Right eye: No discharge. Left eye: No discharge. Pupils: Pupils are equal, round, and reactive to light. Neck:      Thyroid: No thyromegaly. Vascular: No carotid bruit or JVD. Trachea: No tracheal deviation. Cardiovascular:      Rate and Rhythm: Normal rate and regular rhythm. Heart sounds: Normal heart sounds. No murmur heard. No friction rub. No gallop. Comments: No carotid bruit  Pulmonary:      Effort: Pulmonary effort is normal. No respiratory distress. Breath sounds: Normal breath sounds. No wheezing or rales. Abdominal:      Palpations: Abdomen is soft. Tenderness: There is no abdominal tenderness. Musculoskeletal:         General: No swelling or deformity. Cervical back: Neck supple. No muscular tenderness. Right lower leg: No edema. Left lower leg: No edema. Skin:     General: Skin is warm and dry. Findings: No rash. Neurological:      Mental Status: He is alert and oriented to person, place, and time. Cranial Nerves: No cranial nerve deficit. Psychiatric:         Behavior: Behavior normal.         Judgment: Judgment normal.         Data:  Heart Cath 5/19  Conclusions    Severe double vessel disease with subacute thrombotic occlusion of   proximal circumflex with faint distal collaterals and diffuse proximal mid   and distal LAD disease. Severe LV dysfunction with moderate left ventricular dilation and markedly   elevated LV end-diastolic pressures.    Successful PCI to proximal circumflex, proximal mid and distal LAD with   drug-eluting stents    Recommendations    Medical management   Heart failure management   Reevaluate LV function in 3 months with consideration for ICD if ejection   fraction less than 35%. Plavix to remain on uninterrupted for 1 year and would keep on Plavix   lifelong. Echo 8/19  Summary   Limited study to assess LV function. Mild left ventricular enlargement with global hypokinesis estimated   ejection fraction 35-40%   Mild concentric left ventricular hypertrophy. Echo 6/17/20  Conclusions      Summary   Left ventricular size is mildly increased. Generalized hypokinesis of the left ventricle with impairment of LV   systolic function. Left ventricular ejection fraction is visually estimated at 45%. E/A flow reversal noted. Suggestive of diastolic dysfunction. Signature      ----------------------------------------------------------------   Electronically signed by Mirella Ibanez MD(Interpreting   physician) on 06/17/2020 07:08 PM   ----------------------------------------------------------------      Lab Results   Component Value Date    CHOL 136 (L) 09/22/2021    TRIG 123 09/22/2021    HDL 51 (L) 09/22/2021    LDLCALC 60 09/22/2021     Lab Results   Component Value Date    ALT 28 11/07/2021    AST 20 11/07/2021     Lab Results   Component Value Date     11/09/2021    K 4.0 11/09/2021    K 4.1 11/08/2021     11/09/2021    CO2 20 11/09/2021    BUN 20 11/09/2021    CREATININE 1.0 11/09/2021    GLUCOSE 97 11/09/2021    CALCIUM 8.6 11/09/2021      Echo 6/17/20  Conclusions    Summary   Left ventricular size is mildly increased. Generalized hypokinesis of the left ventricle with impairment of LV   systolic function. Left ventricular ejection fraction is visually estimated at 45%. E/A flow reversal noted. Suggestive of diastolic dysfunction.     Echo 11/9/21  Conclusions      Summary   LV is moderate to severely dilated with moderately depressed LV systolic   function. LV ejection fraction estimated at 40 to 45%. Grade 2 diastolic dysfunction. Mild concentric LVH. RV appears normal in size with normal systolic function. Mild left atrial enlargement. Normal right atrial size. Aortic valve is trileaflet with normal leaflet mobility. No stenosis or   significant regurgitation. Mitral valve is mildly thickened with normal leaflet mobility. Trace   mitral regurgitation. No stenosis. No significant pericardial effusion. Pacer wires noted in right-sided chambers. Signature      ----------------------------------------------------------------   Electronically signed by Chris Layne MD(Interpreting physician)   on 11/09/2021 10:05    Assessment:     Diagnosis Orders   1. Coronary artery disease involving native coronary artery of native heart without angina pectoris     2. Chronic systolic CHF (congestive heart failure) ([address]     3. Essential hypertension     4. Mixed hyperlipidemia     5. CHB (complete heart block) (HCC)     6. Pacemaker     7. Peripheral vascular disease ([address]         CAD-  Stable without angina symptoms. Continue aspirin, atorvastatin, carvedilol. Plavix without interruption, likely should continue lifelong    Chronic systolic heart failure NYHA class II, stage C- EF  40-45% 2021-  appears euvolemic on guideline directed medical therapy with carvedilol and Entresto. Encouraged regular weighing, reporting sudden weight gain of 3 pounds or more in 24 hours or 5 pounds in a week, low-sodium diet    Hyperlipidemia- stable on statin therapy with atorvastatin    PVD-history of amputation of toes and follows with vascular surgery. Complete heart block/pacemaker  Pacemaker check showed adequate battery status @11.4 years estimated  Mode: DDDR. Lead impedances are stable  Pacing: AP 53.8%,  97.1%. Appropriate diagnostics and safety margins noted. Sustained arrythmia: None.   Reprogramming done for sensitivity and threshold testing. Please see the scanned interrogation report      Stable cardiovascular status. No evidence of overt heart failure,angina or dysrhythmia. Plan    Return in about 6 months (around 9/29/2022) for Dr. Itz Marin. OK to take an extra Lasix for weight gain over 3lbs in 24 hours. If weight is not improving by the next day, call the office.    - Weigh daily and report weight gain of 3lbs or more in 24hrs or 5lbs in one week. - Call for increasing shortness of breath or increasing swelling in feet and legs. (This could mean you are retaining too much fluid)  - 2000mg low sodium diet  - Fluid restriction of 1500ml per day (about 6 cups of fluid per day)    Call with any questionsor concerns  Follow up with Luisito Salazar MD for non cardiac problems  Report any new problems  Cardiovascular Fitness-Exercise as tolerated. Strive for 15 minutes of exercise most days of the week. Cardiac / HealthyDiet  Continue current medications as directed  Continue plan of treatment  It is always recommended that you bring your medicationsbottles with you to each visit - this is for your safety!        CHRIS Ramos

## 2022-05-16 ENCOUNTER — TELEPHONE (OUTPATIENT)
Dept: FAMILY MEDICINE CLINIC | Age: 72
End: 2022-05-16

## 2022-05-16 NOTE — TELEPHONE ENCOUNTER
Patient needs an appointment. Needs to establish a new PCP. Kristal to call and discuss with patient.

## 2022-05-19 ENCOUNTER — OFFICE VISIT (OUTPATIENT)
Dept: FAMILY MEDICINE CLINIC | Age: 72
End: 2022-05-19
Payer: MEDICARE

## 2022-05-19 VITALS
DIASTOLIC BLOOD PRESSURE: 74 MMHG | TEMPERATURE: 97.1 F | OXYGEN SATURATION: 97 % | SYSTOLIC BLOOD PRESSURE: 128 MMHG | BODY MASS INDEX: 30.81 KG/M2 | WEIGHT: 253 LBS | HEART RATE: 83 BPM | HEIGHT: 76 IN

## 2022-05-19 DIAGNOSIS — Z11.59 NEED FOR HEPATITIS C SCREENING TEST: ICD-10-CM

## 2022-05-19 DIAGNOSIS — I50.9 CONGESTIVE HEART FAILURE, UNSPECIFIED HF CHRONICITY, UNSPECIFIED HEART FAILURE TYPE (HCC): ICD-10-CM

## 2022-05-19 DIAGNOSIS — Z76.89 ESTABLISHING CARE WITH NEW DOCTOR, ENCOUNTER FOR: Primary | ICD-10-CM

## 2022-05-19 DIAGNOSIS — E78.2 MIXED HYPERLIPIDEMIA: ICD-10-CM

## 2022-05-19 DIAGNOSIS — N52.9 ERECTILE DYSFUNCTION, UNSPECIFIED ERECTILE DYSFUNCTION TYPE: ICD-10-CM

## 2022-05-19 DIAGNOSIS — M10.9 ACUTE GOUT OF LEFT WRIST, UNSPECIFIED CAUSE: ICD-10-CM

## 2022-05-19 DIAGNOSIS — Z23 NEED FOR 23-POLYVALENT PNEUMOCOCCAL POLYSACCHARIDE VACCINE: ICD-10-CM

## 2022-05-19 DIAGNOSIS — I73.9 PERIPHERAL VASCULAR DISEASE (HCC): ICD-10-CM

## 2022-05-19 LAB
ALBUMIN SERPL-MCNC: 4 G/DL (ref 3.5–5.2)
ALP BLD-CCNC: 93 U/L (ref 40–130)
ALT SERPL-CCNC: 15 U/L (ref 5–41)
ANION GAP SERPL CALCULATED.3IONS-SCNC: 13 MMOL/L (ref 7–19)
AST SERPL-CCNC: 13 U/L (ref 5–40)
BILIRUB SERPL-MCNC: 0.5 MG/DL (ref 0.2–1.2)
BILIRUBIN URINE: NEGATIVE
BLOOD, URINE: NEGATIVE
BUN BLDV-MCNC: 27 MG/DL (ref 8–23)
CALCIUM SERPL-MCNC: 9.4 MG/DL (ref 8.8–10.2)
CHLORIDE BLD-SCNC: 107 MMOL/L (ref 98–111)
CHOLESTEROL, TOTAL: 115 MG/DL (ref 160–199)
CLARITY: CLEAR
CO2: 21 MMOL/L (ref 22–29)
COLOR: YELLOW
CREAT SERPL-MCNC: 1.3 MG/DL (ref 0.5–1.2)
GFR AFRICAN AMERICAN: >59
GFR NON-AFRICAN AMERICAN: 54
GLUCOSE BLD-MCNC: 99 MG/DL (ref 74–109)
GLUCOSE URINE: NEGATIVE MG/DL
HCT VFR BLD CALC: 39.9 % (ref 42–52)
HDLC SERPL-MCNC: 45 MG/DL (ref 55–121)
HEMOGLOBIN: 12.9 G/DL (ref 14–18)
HEPATITIS C ANTIBODY INTERPRETATION: NORMAL
KETONES, URINE: NEGATIVE MG/DL
LDL CHOLESTEROL CALCULATED: 48 MG/DL
LEUKOCYTE ESTERASE, URINE: NEGATIVE
MCH RBC QN AUTO: 32.1 PG (ref 27–31)
MCHC RBC AUTO-ENTMCNC: 32.3 G/DL (ref 33–37)
MCV RBC AUTO: 99.3 FL (ref 80–94)
NITRITE, URINE: NEGATIVE
PDW BLD-RTO: 12.4 % (ref 11.5–14.5)
PH UA: 5.5 (ref 5–8)
PLATELET # BLD: 157 K/UL (ref 130–400)
PMV BLD AUTO: 10.1 FL (ref 9.4–12.4)
POTASSIUM SERPL-SCNC: 4.5 MMOL/L (ref 3.5–5)
PROTEIN UA: NEGATIVE MG/DL
RBC # BLD: 4.02 M/UL (ref 4.7–6.1)
SODIUM BLD-SCNC: 141 MMOL/L (ref 136–145)
SPECIFIC GRAVITY UA: 1.02 (ref 1–1.03)
TOTAL PROTEIN: 7 G/DL (ref 6.6–8.7)
TRIGL SERPL-MCNC: 110 MG/DL (ref 0–149)
TSH SERPL DL<=0.05 MIU/L-ACNC: 1.68 UIU/ML (ref 0.27–4.2)
URIC ACID, SERUM: 8.1 MG/DL (ref 3.4–7)
UROBILINOGEN, URINE: 0.2 E.U./DL
WBC # BLD: 7.4 K/UL (ref 4.8–10.8)

## 2022-05-19 PROCEDURE — 3017F COLORECTAL CA SCREEN DOC REV: CPT | Performed by: FAMILY MEDICINE

## 2022-05-19 PROCEDURE — G8427 DOCREV CUR MEDS BY ELIG CLIN: HCPCS | Performed by: FAMILY MEDICINE

## 2022-05-19 PROCEDURE — 4040F PNEUMOC VAC/ADMIN/RCVD: CPT | Performed by: FAMILY MEDICINE

## 2022-05-19 PROCEDURE — 99203 OFFICE O/P NEW LOW 30 MIN: CPT | Performed by: FAMILY MEDICINE

## 2022-05-19 PROCEDURE — G8417 CALC BMI ABV UP PARAM F/U: HCPCS | Performed by: FAMILY MEDICINE

## 2022-05-19 PROCEDURE — 1123F ACP DISCUSS/DSCN MKR DOCD: CPT | Performed by: FAMILY MEDICINE

## 2022-05-19 PROCEDURE — 1036F TOBACCO NON-USER: CPT | Performed by: FAMILY MEDICINE

## 2022-05-19 RX ORDER — SACUBITRIL AND VALSARTAN 24; 26 MG/1; MG/1
TABLET, FILM COATED ORAL
Qty: 180 TABLET | Refills: 3 | Status: SHIPPED | OUTPATIENT
Start: 2022-05-19 | End: 2022-09-02 | Stop reason: SDUPTHER

## 2022-05-19 RX ORDER — ATORVASTATIN CALCIUM 40 MG/1
TABLET, FILM COATED ORAL
Qty: 90 TABLET | Refills: 3 | Status: SHIPPED | OUTPATIENT
Start: 2022-05-19

## 2022-05-19 RX ORDER — INDOMETHACIN 50 MG/1
CAPSULE ORAL
Qty: 90 CAPSULE | Refills: 1 | Status: SHIPPED | OUTPATIENT
Start: 2022-05-19

## 2022-05-19 RX ORDER — FUROSEMIDE 20 MG/1
20 TABLET ORAL DAILY
Qty: 90 TABLET | Refills: 3 | Status: SHIPPED | OUTPATIENT
Start: 2022-05-19

## 2022-05-19 RX ORDER — CLOPIDOGREL BISULFATE 75 MG/1
75 TABLET ORAL DAILY
Qty: 30 TABLET | Refills: 3 | Status: SHIPPED | OUTPATIENT
Start: 2022-05-19 | End: 2022-08-24

## 2022-05-19 RX ORDER — PENTOXIFYLLINE 400 MG/1
TABLET, EXTENDED RELEASE ORAL
Qty: 270 TABLET | Refills: 3 | Status: SHIPPED | OUTPATIENT
Start: 2022-05-19 | End: 2022-09-02 | Stop reason: SDUPTHER

## 2022-05-19 RX ORDER — CARVEDILOL 6.25 MG/1
6.25 TABLET ORAL 2 TIMES DAILY WITH MEALS
Qty: 60 TABLET | Refills: 3 | Status: SHIPPED | OUTPATIENT
Start: 2022-05-19 | End: 2022-08-24

## 2022-05-19 RX ORDER — SILDENAFIL 100 MG/1
TABLET, FILM COATED ORAL
Qty: 30 TABLET | Refills: 5 | Status: SHIPPED | OUTPATIENT
Start: 2022-05-19

## 2022-05-19 ASSESSMENT — PATIENT HEALTH QUESTIONNAIRE - PHQ9
SUM OF ALL RESPONSES TO PHQ QUESTIONS 1-9: 0
2. FEELING DOWN, DEPRESSED OR HOPELESS: 0
1. LITTLE INTEREST OR PLEASURE IN DOING THINGS: 0
SUM OF ALL RESPONSES TO PHQ QUESTIONS 1-9: 0
SUM OF ALL RESPONSES TO PHQ9 QUESTIONS 1 & 2: 0

## 2022-05-19 ASSESSMENT — ENCOUNTER SYMPTOMS
RESPIRATORY NEGATIVE: 1
GASTROINTESTINAL NEGATIVE: 1
EYES NEGATIVE: 1
ALLERGIC/IMMUNOLOGIC NEGATIVE: 1

## 2022-05-19 NOTE — PROGRESS NOTES
SUBJECTIVE:    Patient ID: Gladys Lopez is a 74 y.o.male. HPI:   Establish care. Patient is a 64-year-old white male. He has past medical history significant for coronary artery disease and CHF. He is on blood pressure medication and cholesterol medication. He is due for blood work. He has been doing well. He denies any orthopnea or lower extremity edema. He also have peripheral vascular disease. He no longer smokes. Request a refill on Trental.  He has history of gout. His left wrist is well a couple of weeks ago. He is slowly getting better. He used to have indometacin. Request a refill of medication. He also have erectile dysfunction. He takes Viagra. Medication is helpful. Past Medical History:   Diagnosis Date    [facility]     CAD (coronary artery disease)     CARDIAC STENTS X 4    [facility]     Gout     History of acute congestive heart failure     Hyperlipidemia     Hypertension     Neuropathy involving both lower extremities      Current Outpatient Medications on File Prior to Visit   Medication Sig Dispense Refill    Multiple Vitamins-Minerals (MULTIVITAMIN ADULT, MINERALS,) TABS Take 1 tablet by mouth daily 90 tablet 3    aspirin 81 MG chewable tablet TAKE 1 TABLET BY MOUTH EVERY DAY 90 tablet 3    nitroGLYCERIN (NITROSTAT) 0.4 MG SL tablet up to max of 3 total doses. If no relief after 1 dose, call 911. 25 tablet 1     No current facility-administered medications on file prior to visit.      Allergies   Allergen Reactions    Penicillins      Unknown what it does     Past Surgical History:   Procedure Laterality Date    CARDIAC CATHETERIZATION  05/2019    CATARACT REMOVAL  03/15/2022    right eye    HERNIA REPAIR      TOE AMPUTATION Right 4/15/2021    RIGHT THIRD TOE AMPUTATION performed by Zhanna Welch,  at [facility] OR     Family History   Problem Relation Age of Onset    Heart Surgery Brother     Heart Disease Brother      Social History     Socioeconomic History    Marital status:      Spouse name: Not on file    Number of children: Not on file    Years of education: Not on file    Highest education level: Not on file   Occupational History    Not on file   Tobacco Use    Smoking status: Former Smoker     Packs/day: 1.00     Years: 30.00     Pack years: 30.00     Types: Cigarettes     Quit date: 1980     Years since quittin.0    Smokeless tobacco: Never Used    Tobacco comment: quit over 25 yrs ago   Vaping Use    Vaping Use: Never used   Substance and Sexual Activity    Alcohol use: No    Drug use: No    Sexual activity: Not Currently   Other Topics Concern    Not on file   Social History Narrative    Not on file     Social Determinants of Health     Financial Resource Strain:     Difficulty of Paying Living Expenses: Not on file   Food Insecurity:     Worried About [address] in the Last Year: Not on file    [address] in the Last Year: Not on file   Transportation Needs:     Lack of Transportation (Medical): Not on file    Lack of Transportation (Non-Medical):  Not on file   Physical Activity:     Days of Exercise per Week: Not on file    Minutes of Exercise per Session: Not on file   Stress:     Feeling of Stress : Not on file   Social Connections:     Frequency of Communication with Friends and Family: Not on file    Frequency of Social Gatherings with Friends and Family: Not on file    Attends Baptism Services: Not on file    Active Member of Clubs or Organizations: Not on file    Attends Club or Organization Meetings: Not on file    Marital Status: Not on file   Intimate Partner Violence:     Fear of Current or Ex-Partner: Not on file    Emotionally Abused: Not on file    Physically Abused: Not on file    Sexually Abused: Not on file   Housing Stability:     Unable to Pay for Housing in the Last Year: Not on file    Number of [facility] in the Last Year: Not on file    Unstable Housing in the Last Year: Not on file        Review of Systems   Constitutional: Negative. HENT: Negative. Eyes: Negative. Respiratory: Negative. Cardiovascular: Negative. Gastrointestinal: Negative. Endocrine: Negative. Genitourinary: Negative. Musculoskeletal: Positive for arthralgias. Skin: Negative. Allergic/Immunologic: Negative. Neurological: Negative. Hematological: Negative. Psychiatric/Behavioral: Negative. OBJECTIVE:    Physical Exam  Vitals reviewed. Constitutional:       Appearance: Normal appearance. He is well-developed. HENT:      Head: Normocephalic and atraumatic. Right Ear: Tympanic membrane, ear canal and external ear normal. There is no impacted cerumen. Left Ear: Tympanic membrane, ear canal and external ear normal. There is no impacted cerumen. Nose: Nose normal.      Mouth/Throat:      Lips: Pink. Mouth: Mucous membranes are moist.      Dentition: Normal dentition. Tongue: No lesions. Pharynx: Oropharynx is clear. Uvula midline. Tonsils: No tonsillar exudate or tonsillar abscesses. Eyes:      General: Lids are normal.         Right eye: No discharge. Left eye: No discharge. Extraocular Movements:      Right eye: Normal extraocular motion. Left eye: Normal extraocular motion. Conjunctiva/sclera: Conjunctivae normal.      Right eye: Right conjunctiva is not injected. Left eye: Left conjunctiva is not injected. Pupils: Pupils are equal, round, and reactive to light. Neck:      Thyroid: No thyromegaly. Vascular: No carotid bruit or JVD. Cardiovascular:      Rate and Rhythm: Normal rate and regular rhythm. Pulses:           Carotid pulses are 2+ on the right side and 2+ on the left side. Radial pulses are 2+ on the right side and 2+ on the left side.       Heart sounds: Normal heart sounds, S1 normal and S2 normal. No murmur heard. Pulmonary:      Effort: Pulmonary effort is normal. No accessory muscle usage. Breath sounds: Normal breath sounds. Abdominal:      General: Bowel sounds are normal. There is no distension or abdominal bruit. Palpations: Abdomen is soft. There is no mass. Tenderness: There is no abdominal tenderness. Hernia: No hernia is present. Musculoskeletal:         General: Tenderness (Left wrist tenderness and some swelling) present. Normal range of motion. Cervical back: Normal range of motion and neck supple. Right lower leg: No edema. Left lower leg: No edema. Lymphadenopathy:      Cervical:      Right cervical: No superficial cervical adenopathy. Left cervical: No superficial cervical adenopathy. Skin:     General: Skin is warm and dry. Coloration: Skin is not jaundiced or pale. Findings: No lesion or rash. Nails: There is no clubbing. Neurological:      Mental Status: He is alert and oriented to person, place, and time. Cranial Nerves: No facial asymmetry. Motor: No weakness or tremor. Coordination: Coordination normal.      Gait: Gait normal.      Deep Tendon Reflexes: Reflexes are normal and symmetric. Psychiatric:         Attention and Perception: Attention normal.         Mood and Affect: Mood normal.         Speech: Speech normal.         Behavior: Behavior normal.         Thought Content: Thought content normal.         Cognition and Memory: Memory normal.         Judgment: Judgment normal.        /74 (Site: Left Upper Arm, Position: Sitting, Cuff Size: Medium Adult)   Pulse 83   Temp 97.1 °F (36.2 °C) (Temporal)   Ht 6' 3.5\" (1.918 m)   Wt 253 lb (114.8 kg)   SpO2 97%   BMI 31.21 kg/m²      ASSESSMENT:     Diagnosis Orders   1. Establishing care with new doctor, encounter for     2.  Congestive heart failure, unspecified HF chronicity, unspecified heart failure type (HCC) stable furosemide (LASIX) 20 MG tablet    sacubitril-valsartan (ENTRESTO) 24-26 MG per tablet    clopidogrel (PLAVIX) 75 MG tablet    carvedilol (COREG) 6.25 MG tablet    atorvastatin (LIPITOR) 40 MG tablet    CBC    Comprehensive Metabolic Panel    Lipid Panel    TSH    Urinalysis   3. Mixed hyperlipidemia-stable    4. Peripheral vascular disease (HCC)-stable pentoxifylline (TRENTAL) 400 MG extended release tablet    atorvastatin (LIPITOR) 40 MG tablet   5. Acute gout of left wrist, unspecified cause  Uric Acid    indomethacin (INDOCIN) 50 MG capsule   6. Erectile dysfunction, unspecified erectile dysfunction type-stable sildenafil (VIAGRA) 100 MG tablet   7. Need for hepatitis C screening test  Hepatitis C Antibody   8. Need for 23-polyvalent pneumococcal polysaccharide vaccine  PNEUMOVAX 23 subcutaneous/IM (Pneumococcal polysaccharide vaccine 23-valent >= 1yo)        PLAN:    1. We will assume care  2. Continue medications. Blood work. 3.  Continue medication  4. Continue medication  5. Indometacin and blood work  6. Continue medication  7. Blood work  8.   Pneumonia vaccine  Follow-up 6 months

## 2022-05-20 PROCEDURE — G0009 ADMIN PNEUMOCOCCAL VACCINE: HCPCS | Performed by: FAMILY MEDICINE

## 2022-05-20 PROCEDURE — 90732 PPSV23 VACC 2 YRS+ SUBQ/IM: CPT | Performed by: FAMILY MEDICINE

## 2022-06-28 ENCOUNTER — TELEPHONE (OUTPATIENT)
Dept: PRIMARY CARE CLINIC | Age: 72
End: 2022-06-28

## 2022-06-28 PROCEDURE — 93296 REM INTERROG EVL PM/IDS: CPT | Performed by: CLINICAL NURSE SPECIALIST

## 2022-06-28 PROCEDURE — 93294 REM INTERROG EVL PM/LDLS PM: CPT | Performed by: CLINICAL NURSE SPECIALIST

## 2022-06-29 DIAGNOSIS — I44.2 CHB (COMPLETE HEART BLOCK) (HCC): ICD-10-CM

## 2022-06-29 DIAGNOSIS — Z95.0 PACEMAKER: Primary | ICD-10-CM

## 2022-07-22 ENCOUNTER — TELEPHONE (OUTPATIENT)
Dept: PRIMARY CARE CLINIC | Age: 72
End: 2022-07-22

## 2022-07-26 ENCOUNTER — TELEPHONE (OUTPATIENT)
Dept: PRIMARY CARE CLINIC | Age: 72
End: 2022-07-26

## 2022-08-24 DIAGNOSIS — I50.9 CONGESTIVE HEART FAILURE, UNSPECIFIED HF CHRONICITY, UNSPECIFIED HEART FAILURE TYPE (HCC): ICD-10-CM

## 2022-08-24 RX ORDER — CARVEDILOL 6.25 MG/1
TABLET ORAL
Qty: 180 TABLET | Refills: 1 | Status: SHIPPED | OUTPATIENT
Start: 2022-08-24

## 2022-08-24 RX ORDER — CLOPIDOGREL BISULFATE 75 MG/1
TABLET ORAL
Qty: 90 TABLET | Refills: 1 | Status: SHIPPED | OUTPATIENT
Start: 2022-08-24

## 2022-09-02 DIAGNOSIS — I73.9 PERIPHERAL VASCULAR DISEASE (HCC): ICD-10-CM

## 2022-09-02 DIAGNOSIS — I50.9 CONGESTIVE HEART FAILURE, UNSPECIFIED HF CHRONICITY, UNSPECIFIED HEART FAILURE TYPE (HCC): ICD-10-CM

## 2022-09-06 RX ORDER — PENTOXIFYLLINE 400 MG/1
TABLET, EXTENDED RELEASE ORAL
Qty: 270 TABLET | Refills: 3 | Status: SHIPPED | OUTPATIENT
Start: 2022-09-06

## 2022-09-06 RX ORDER — SACUBITRIL AND VALSARTAN 24; 26 MG/1; MG/1
TABLET, FILM COATED ORAL
Qty: 180 TABLET | Refills: 3 | Status: SHIPPED | OUTPATIENT
Start: 2022-09-06

## 2022-09-28 PROCEDURE — 93294 REM INTERROG EVL PM/LDLS PM: CPT | Performed by: CLINICAL NURSE SPECIALIST

## 2022-09-28 PROCEDURE — 93296 REM INTERROG EVL PM/IDS: CPT | Performed by: CLINICAL NURSE SPECIALIST

## 2022-09-29 DIAGNOSIS — Z95.0 PACEMAKER: Primary | ICD-10-CM

## 2022-09-29 DIAGNOSIS — I44.2 CHB (COMPLETE HEART BLOCK) (HCC): ICD-10-CM

## 2022-10-19 ENCOUNTER — TELEPHONE (OUTPATIENT)
Dept: CARDIOLOGY CLINIC | Age: 72
End: 2022-10-19

## 2022-10-19 NOTE — TELEPHONE ENCOUNTER
Date: 11-10-22     Cardiologist: Dr. Teresa Brown     Procedure: Cataract Surgery     Surgeon: Pito Baumann     Last Office Visit: 3-29-22     Reason for office visit and medical concerns addressed at this office visit: CAD, CHF, PVD, AAA, HTN, hyperlipidemia     Testing Performed and Date of Service:  EKG 4-13-21  Echo 6-17-20     RCRI = 2 pts, elevated, 6.6%   METs 4     Current Medications: lasix, nitro, lipitor, viagra, trental, entresto, coreg, ASA, omega 3     Is the patient currently taking an anticoagulant? If so, what is the diagnosis the patient has been given to warrant the need for the anticoagulant?  ASA     Additional Notes: Cardiac Risk Request

## 2022-11-08 ENCOUNTER — TELEPHONE (OUTPATIENT)
Dept: CARDIOLOGY CLINIC | Age: 72
End: 2022-11-08

## 2022-11-09 ENCOUNTER — OFFICE VISIT (OUTPATIENT)
Dept: CARDIOLOGY CLINIC | Age: 72
End: 2022-11-09
Payer: MEDICARE

## 2022-11-09 VITALS
DIASTOLIC BLOOD PRESSURE: 84 MMHG | SYSTOLIC BLOOD PRESSURE: 124 MMHG | BODY MASS INDEX: 31.33 KG/M2 | HEART RATE: 71 BPM | HEIGHT: 75 IN | WEIGHT: 252 LBS

## 2022-11-09 DIAGNOSIS — I10 ESSENTIAL HYPERTENSION: ICD-10-CM

## 2022-11-09 DIAGNOSIS — I73.9 PERIPHERAL VASCULAR DISEASE (HCC): ICD-10-CM

## 2022-11-09 DIAGNOSIS — I50.22 CHRONIC SYSTOLIC CHF (CONGESTIVE HEART FAILURE) (HCC): ICD-10-CM

## 2022-11-09 DIAGNOSIS — I44.2 CHB (COMPLETE HEART BLOCK) (HCC): ICD-10-CM

## 2022-11-09 DIAGNOSIS — E78.2 MIXED HYPERLIPIDEMIA: ICD-10-CM

## 2022-11-09 DIAGNOSIS — Z95.0 PACEMAKER: ICD-10-CM

## 2022-11-09 DIAGNOSIS — I25.10 CORONARY ARTERY DISEASE INVOLVING NATIVE CORONARY ARTERY OF NATIVE HEART WITHOUT ANGINA PECTORIS: Primary | ICD-10-CM

## 2022-11-09 PROCEDURE — 3078F DIAST BP <80 MM HG: CPT | Performed by: CLINICAL NURSE SPECIALIST

## 2022-11-09 PROCEDURE — 1036F TOBACCO NON-USER: CPT | Performed by: CLINICAL NURSE SPECIALIST

## 2022-11-09 PROCEDURE — 1124F ACP DISCUSS-NO DSCNMKR DOCD: CPT | Performed by: CLINICAL NURSE SPECIALIST

## 2022-11-09 PROCEDURE — 99214 OFFICE O/P EST MOD 30 MIN: CPT | Performed by: CLINICAL NURSE SPECIALIST

## 2022-11-09 PROCEDURE — 93280 PM DEVICE PROGR EVAL DUAL: CPT | Performed by: CLINICAL NURSE SPECIALIST

## 2022-11-09 PROCEDURE — G8484 FLU IMMUNIZE NO ADMIN: HCPCS | Performed by: CLINICAL NURSE SPECIALIST

## 2022-11-09 PROCEDURE — 3074F SYST BP LT 130 MM HG: CPT | Performed by: CLINICAL NURSE SPECIALIST

## 2022-11-09 PROCEDURE — G8417 CALC BMI ABV UP PARAM F/U: HCPCS | Performed by: CLINICAL NURSE SPECIALIST

## 2022-11-09 PROCEDURE — G8427 DOCREV CUR MEDS BY ELIG CLIN: HCPCS | Performed by: CLINICAL NURSE SPECIALIST

## 2022-11-09 PROCEDURE — 3017F COLORECTAL CA SCREEN DOC REV: CPT | Performed by: CLINICAL NURSE SPECIALIST

## 2022-11-09 RX ORDER — SACUBITRIL AND VALSARTAN 24; 26 MG/1; MG/1
TABLET, FILM COATED ORAL
Qty: 180 TABLET | Refills: 3 | Status: SHIPPED | OUTPATIENT
Start: 2022-11-09

## 2022-11-09 ASSESSMENT — ENCOUNTER SYMPTOMS
VOMITING: 0
WHEEZING: 0
FACIAL SWELLING: 0
ABDOMINAL PAIN: 0
EYE REDNESS: 0
COUGH: 0
CHEST TIGHTNESS: 0
SHORTNESS OF BREATH: 1
NAUSEA: 0

## 2022-11-09 NOTE — PATIENT INSTRUCTIONS
Return in about 6 months (around 5/9/2023) for APRN. OK to take an extra Lasix for weight gain over 3lbs in 24 hours. If weight is not improving by the next day, call the office.    - Weigh daily and report weight gain of 3lbs or more in 24hrs or 5lbs in one week. - Call for increasing shortness of breath or increasing swelling in feet and legs.     (This could mean you are retaining too much fluid)  - 2000mg low sodium diet  - Fluid restriction of 1500ml per day (about 6 cups of fluid per day)

## 2022-11-09 NOTE — PROGRESS NOTES
[facility], [address] [place] [address], [address] [id number]  Phone: [phone]  Fax: [phone]    OFFICE VISIT:  2022    Caroline Vizcaino - : 1950    Reason For Visit:  Dimple Wang is a 74 y.o. male who is here for Follow-up and Coronary Artery Disease     Diagnosis Orders   1. Coronary artery disease involving native coronary artery of native heart without angina pectoris        2. Chronic systolic CHF (congestive heart failure) ([address]        3. Essential hypertension        4. Mixed hyperlipidemia        5. Peripheral vascular disease ([address]        6. CHB (complete heart block) ([facility])        7. Pacemaker             HPI  Patient presented to the hospital on 2019 with acute decompensated heart failure, non-STEMI. Cath showed EF of 30 to 35% with PCI to the proximal circumflex, mid and distal LAD. EF improved to 35-40% in Aug 2019, and EF 45% . In 2021, patient had syncopal episode with complete heart block, ended up with a pacemaker, heart cath with PCI with drug-eluting stent to the distal RCA. EF per echo 40 to 45%    History of amputation of toes due to PVD and follows with vascular surgery. He denies chest pain. He has some mild shortness of breath, but denies orthopnea , PND, edema, or sudden weight gain. Patient continues to work part-time as a  and tolerates this well. Della Villarreal MD is PCP.   Caroline Vizcaino has the following history as recorded in [facility]:    Patient Active Problem List    Diagnosis Date Noted    History of amputation of lesser toe of right foot ([facility] 2021    Open toe wound 2019    Chronic systolic CHF (congestive heart failure) ([address] 2019    Coronary artery disease involving native coronary artery of native heart without angina pectoris 2019    Hypotension due to drugs 2019    AAA (abdominal aortic aneurysm) without rupture 2019    History of acute congestive heart failure NSTEMI (non-ST elevated myocardial infarction) ([facility] 2019    Acute pain of right shoulder 2019    Hearing aid worn 10/03/2017    Idiopathic peripheral neuropathy 2016    Benign nodular prostatic hyperplasia without lower urinary tract symptoms 2016    Peripheral vascular disease ([facility] 2016    Tinea pedis of left foot 2016    Neuropathy 03/15/2016    Gout 2014    Anxiety 2014    Mixed hyperlipidemia 2014     Past Medical History:   Diagnosis Date    AAA (abdominal aortic aneurysm)     CAD (coronary artery disease)     CARDIAC STENTS X 4    CHF (congestive heart failure) (HCC)     Gout     History of acute congestive heart failure     Hyperlipidemia     Hypertension     Neuropathy involving both lower extremities      Past Surgical History:   Procedure Laterality Date    CARDIAC CATHETERIZATION  2019    CATARACT REMOVAL  03/15/2022    right eye    HERNIA REPAIR      TOE AMPUTATION Right 4/15/2021    RIGHT THIRD TOE AMPUTATION performed by Shailesh Thomas DO at [facility] OR     Family History   Problem Relation Age of Onset    Heart Surgery Brother     Heart Disease Brother      Social History     Tobacco Use    Smoking status: Former     Packs/day: 1.00     Years: 30.00     Pack years: 30.00     Types: Cigarettes     Quit date: 1980     Years since quittin.4    Smokeless tobacco: Never    Tobacco comments:     quit over 25 yrs ago   Substance Use Topics    Alcohol use: No      Current Outpatient Medications   Medication Sig Dispense Refill    sacubitril-valsartan (ENTRESTO) 24-26 MG per tablet TAKE 1 TABLET BY MOUTH TWICE A  tablet 3    pentoxifylline (TRENTAL) 400 MG extended release tablet TAKE 1 TABLET BY MOUTH 3 TIMES DAILY WITH MEALS. 270 tablet 3    carvedilol (COREG) 6.25 MG tablet TAKE 1 TABLET BY MOUTH TWICE A DAY WITH MEALS 180 tablet 1    clopidogrel (PLAVIX) 75 MG tablet TAKE 1 TABLET BY MOUTH EVERY DAY 90 tablet 1    furosemide (LASIX) 20 MG tablet Take 1 tablet by mouth daily 90 tablet 3    atorvastatin (LIPITOR) 40 MG tablet TAKE 1 TABLET BY MOUTH EVERY DAY AT NIGHT 90 tablet 3    indomethacin (INDOCIN) 50 MG capsule 1 tab 3 times daily  X 2 days then 1 pill bid x 3 day then 1 pill daily x 4 days 90 capsule 1    Multiple Vitamins-Minerals (MULTIVITAMIN ADULT, MINERALS,) TABS Take 1 tablet by mouth daily 90 tablet 3    aspirin 81 MG chewable tablet TAKE 1 TABLET BY MOUTH EVERY DAY 90 tablet 3    nitroGLYCERIN (NITROSTAT) 0.4 MG SL tablet up to max of 3 total doses. If no relief after 1 dose, call 911. 25 tablet 1     No current facility-administered medications for this visit. Allergies: Penicillins    Review of Systems  Review of Systems   Constitutional:  Negative for activity change, diaphoresis, fatigue, fever and unexpected weight change. HENT:  Negative for facial swelling and nosebleeds. Eyes:  Negative for redness and visual disturbance. Respiratory:  Positive for shortness of breath (mild, unchanged). Negative for cough, chest tightness and wheezing. Cardiovascular:  Negative for chest pain, palpitations and leg swelling. Gastrointestinal:  Negative for abdominal pain, nausea and vomiting. Endocrine: Negative for cold intolerance and heat intolerance. Genitourinary:  Negative for dysuria and hematuria. Musculoskeletal:  Negative for arthralgias and myalgias. Skin:  Negative for pallor and rash. Neurological:  Negative for dizziness, seizures, syncope, weakness and light-headedness. Hematological:  Does not bruise/bleed easily. Psychiatric/Behavioral:  Negative for agitation. The patient is not nervous/anxious.       Objective  Vital Signs - /84   Pulse 71   Ht 6' 3\" (1.905 m)   Wt 252 lb (114.3 kg)   BMI 31.50 kg/m²    Wt Readings from Last 3 Encounters:   11/09/22 252 lb (114.3 kg)   05/19/22 253 lb (114.8 kg)   03/29/22 248 lb (112.5 kg)        BP Readings from Last 3 Encounters: 11/09/22 124/84   05/19/22 128/74   03/29/22 106/72    Pulse Readings from Last 3 Encounters:   11/09/22 71   05/19/22 83   03/29/22 65        Physical Exam  Vitals and nursing note reviewed. Constitutional:       General: He is not in acute distress. Appearance: Normal appearance. He is well-developed. HENT:      Head: Normocephalic and atraumatic. Right Ear: Hearing and external ear normal.      Left Ear: Hearing and external ear normal.      Nose: Nose normal.   Eyes:      General:         Right eye: No discharge. Left eye: No discharge. Pupils: Pupils are equal, round, and reactive to light. Neck:      Thyroid: No thyromegaly. Vascular: No carotid bruit or JVD. Trachea: No tracheal deviation. Cardiovascular:      Rate and Rhythm: Normal rate and regular rhythm. Heart sounds: Normal heart sounds. No murmur heard. No friction rub. No gallop. Comments: No carotid bruit  Pulmonary:      Effort: Pulmonary effort is normal. No respiratory distress. Breath sounds: Normal breath sounds. No wheezing or rales. Abdominal:      Palpations: Abdomen is soft. Tenderness: There is no abdominal tenderness. Musculoskeletal:         General: No swelling or deformity. Cervical back: Neck supple. No muscular tenderness. Right lower leg: No edema. Left lower leg: No edema. Skin:     General: Skin is warm and dry. Findings: No rash. Neurological:      Mental Status: He is alert and oriented to person, place, and time. Cranial Nerves: No cranial nerve deficit. Psychiatric:         Behavior: Behavior normal.         Judgment: Judgment normal.       Data:  Heart Cath 5/19  Conclusions    Severe double vessel disease with subacute thrombotic occlusion of   proximal circumflex with faint distal collaterals and diffuse proximal mid   and distal LAD disease.    Severe LV dysfunction with moderate left ventricular dilation and markedly elevated LV end-diastolic pressures. Successful PCI to proximal circumflex, proximal mid and distal LAD with   drug-eluting stents    Recommendations    Medical management   Heart failure management   Reevaluate LV function in 3 months with consideration for ICD if ejection   fraction less than 35%. Plavix to remain on uninterrupted for 1 year and would keep on Plavix   lifelong. Echo 8/19  Summary   Limited study to assess LV function. Mild left ventricular enlargement with global hypokinesis estimated   ejection fraction 35-40%   Mild concentric left ventricular hypertrophy. Echo 6/17/20  Conclusions      Summary   Left ventricular size is mildly increased. Generalized hypokinesis of the left ventricle with impairment of LV   systolic function. Left ventricular ejection fraction is visually estimated at 45%. E/A flow reversal noted. Suggestive of diastolic dysfunction. Signature      ----------------------------------------------------------------   Electronically signed by Lina Metzger MD(Interpreting   physician) on 06/17/2020 07:08 PM   ----------------------------------------------------------------      Lab Results   Component Value Date    CHOL 115 (L) 05/19/2022    TRIG 110 05/19/2022    HDL 45 (L) 05/19/2022    LDLCALC 48 05/19/2022     Lab Results   Component Value Date    ALT 15 05/19/2022    AST 13 05/19/2022     Lab Results   Component Value Date/Time     05/19/2022 10:44 AM    K 4.5 05/19/2022 10:44 AM    K 4.1 11/08/2021 03:34 AM     05/19/2022 10:44 AM    CO2 21 05/19/2022 10:44 AM    BUN 27 05/19/2022 10:44 AM    CREATININE 1.3 05/19/2022 10:44 AM    GLUCOSE 99 05/19/2022 10:44 AM    CALCIUM 9.4 05/19/2022 10:44 AM      Echo 6/17/20  Conclusions    Summary   Left ventricular size is mildly increased. Generalized hypokinesis of the left ventricle with impairment of LV   systolic function. Left ventricular ejection fraction is visually estimated at 45%. E/A flow reversal noted. Suggestive of diastolic dysfunction. Echo 11/9/21  Conclusions      Summary   LV is moderate to severely dilated with moderately depressed LV systolic   function. LV ejection fraction estimated at 40 to 45%. Grade 2 diastolic dysfunction. Mild concentric LVH. RV appears normal in size with normal systolic function. Mild left atrial enlargement. Normal right atrial size. Aortic valve is trileaflet with normal leaflet mobility. No stenosis or   significant regurgitation. Mitral valve is mildly thickened with normal leaflet mobility. Trace   mitral regurgitation. No stenosis. No significant pericardial effusion. Pacer wires noted in right-sided chambers. Signature      ----------------------------------------------------------------   Electronically signed by Karen Layne MD(Interpreting physician)   on 11/09/2021 10:05    Assessment:     Diagnosis Orders   1. Coronary artery disease involving native coronary artery of native heart without angina pectoris        2. Chronic systolic CHF (congestive heart failure) ([address]        3. Essential hypertension        4. Mixed hyperlipidemia        5. Peripheral vascular disease ([address]        6. CHB (complete heart block) (HCC)        7. Pacemaker            CAD-  Stable without angina symptoms. Continue aspirin, atorvastatin, carvedilol. Plavix without interruption, likely should continue lifelong    Chronic systolic heart failure NYHA class II, stage C- EF  40-45% 2021-  appears euvolemic on guideline directed medical therapy with carvedilol and Entresto, Lasix. Encouraged regular weighing, reporting sudden weight gain of 3 pounds or more in 24 hours or 5 pounds in a week, low-sodium diet    Hyperlipidemia- stable on statin therapy with atorvastatin    PVD-history of amputation of toes and follows with vascular surgery.     Complete heart block/pacemaker  Pacemaker check showed adequate battery status @10.6 years estimated  Mode: DDDR.  Lead impedances are stable  Pacing: AP 60.7%,  98.8%. Appropriate diagnostics and safety margins noted. Sustained arrythmia: None. Reprogramming done for sensitivity and threshold testing. Please see the scanned interrogation report      Stable cardiovascular status. No evidence of overt heart failure,angina or dysrhythmia. Plan    Return in about 6 months (around 5/9/2023) for CHRIS. OK to take an extra Lasix for weight gain over 3lbs in 24 hours. If weight is not improving by the next day, call the office.    - Weigh daily and report weight gain of 3lbs or more in 24hrs or 5lbs in one week. - Call for increasing shortness of breath or increasing swelling in feet and legs. (This could mean you are retaining too much fluid)  - 2000mg low sodium diet  - Fluid restriction of 1500ml per day (about 6 cups of fluid per day)    Call with any questionsor concerns  Follow up with Oneyda Ayers MD for non cardiac problems  Report any new problems  Cardiovascular Fitness-Exercise as tolerated. Strive for 15 minutes of exercise most days of the week. Cardiac / HealthyDiet  Continue current medications as directed  Continue plan of treatment  It is always recommended that you bring your medicationsbottles with you to each visit - this is for your safety!        CHRIS Rawls

## 2022-11-14 DIAGNOSIS — I73.9 PERIPHERAL VASCULAR DISEASE (HCC): ICD-10-CM

## 2022-11-14 DIAGNOSIS — I50.9 CONGESTIVE HEART FAILURE, UNSPECIFIED HF CHRONICITY, UNSPECIFIED HEART FAILURE TYPE (HCC): ICD-10-CM

## 2022-11-14 DIAGNOSIS — M10.9 ACUTE GOUT OF LEFT WRIST, UNSPECIFIED CAUSE: ICD-10-CM

## 2022-11-14 RX ORDER — CARVEDILOL 6.25 MG/1
6.25 TABLET ORAL 2 TIMES DAILY
Qty: 180 TABLET | Refills: 3 | Status: SHIPPED | OUTPATIENT
Start: 2022-11-14

## 2022-11-14 RX ORDER — INDOMETHACIN 50 MG/1
CAPSULE ORAL
Qty: 90 CAPSULE | Refills: 1 | Status: SHIPPED | OUTPATIENT
Start: 2022-11-14

## 2022-11-14 RX ORDER — CLOPIDOGREL BISULFATE 75 MG/1
75 TABLET ORAL DAILY
Qty: 90 TABLET | Refills: 3 | Status: SHIPPED | OUTPATIENT
Start: 2022-11-14

## 2022-11-14 RX ORDER — FUROSEMIDE 20 MG/1
20 TABLET ORAL DAILY
Qty: 90 TABLET | Refills: 3 | Status: SHIPPED | OUTPATIENT
Start: 2022-11-14

## 2022-11-14 RX ORDER — PENTOXIFYLLINE 400 MG/1
TABLET, EXTENDED RELEASE ORAL
Qty: 270 TABLET | Refills: 3 | Status: SHIPPED | OUTPATIENT
Start: 2022-11-14

## 2022-11-14 RX ORDER — ATORVASTATIN CALCIUM 40 MG/1
TABLET, FILM COATED ORAL
Qty: 90 TABLET | Refills: 3 | Status: SHIPPED | OUTPATIENT
Start: 2022-11-14

## 2023-02-09 DIAGNOSIS — I44.2 CHB (COMPLETE HEART BLOCK) (HCC): ICD-10-CM

## 2023-02-09 DIAGNOSIS — Z95.0 PACEMAKER: Primary | ICD-10-CM

## 2023-03-08 DIAGNOSIS — M10.9 ACUTE GOUT OF LEFT WRIST, UNSPECIFIED CAUSE: ICD-10-CM

## 2023-03-08 RX ORDER — INDOMETHACIN 50 MG/1
CAPSULE ORAL
Qty: 30 CAPSULE | Refills: 0 | Status: SHIPPED | OUTPATIENT
Start: 2023-03-08

## 2023-05-11 DIAGNOSIS — Z95.0 PACEMAKER: Primary | ICD-10-CM

## 2023-05-11 DIAGNOSIS — I44.2 CHB (COMPLETE HEART BLOCK) (HCC): ICD-10-CM

## 2023-05-18 ENCOUNTER — OFFICE VISIT (OUTPATIENT)
Dept: FAMILY MEDICINE CLINIC | Age: 73
End: 2023-05-18
Payer: MEDICARE

## 2023-05-18 VITALS
BODY MASS INDEX: 32.2 KG/M2 | SYSTOLIC BLOOD PRESSURE: 122 MMHG | RESPIRATION RATE: 16 BRPM | OXYGEN SATURATION: 99 % | WEIGHT: 259 LBS | DIASTOLIC BLOOD PRESSURE: 86 MMHG | TEMPERATURE: 96.8 F | HEART RATE: 85 BPM | HEIGHT: 75 IN

## 2023-05-18 DIAGNOSIS — E78.2 MIXED HYPERLIPIDEMIA: ICD-10-CM

## 2023-05-18 DIAGNOSIS — Z89.421 HISTORY OF AMPUTATION OF LESSER TOE OF RIGHT FOOT (HCC): ICD-10-CM

## 2023-05-18 DIAGNOSIS — I50.9 CONGESTIVE HEART FAILURE, UNSPECIFIED HF CHRONICITY, UNSPECIFIED HEART FAILURE TYPE (HCC): Primary | ICD-10-CM

## 2023-05-18 DIAGNOSIS — I50.9 CONGESTIVE HEART FAILURE, UNSPECIFIED HF CHRONICITY, UNSPECIFIED HEART FAILURE TYPE (HCC): ICD-10-CM

## 2023-05-18 DIAGNOSIS — I73.9 PERIPHERAL VASCULAR DISEASE (HCC): ICD-10-CM

## 2023-05-18 LAB
ALBUMIN SERPL-MCNC: 4.5 G/DL (ref 3.5–5.2)
ALP SERPL-CCNC: 83 U/L (ref 40–130)
ALT SERPL-CCNC: 19 U/L (ref 5–41)
ANION GAP SERPL CALCULATED.3IONS-SCNC: 10 MMOL/L (ref 7–19)
AST SERPL-CCNC: 16 U/L (ref 5–40)
BILIRUB SERPL-MCNC: 0.8 MG/DL (ref 0.2–1.2)
BILIRUB UR QL STRIP: NEGATIVE
BUN SERPL-MCNC: 26 MG/DL (ref 8–23)
CALCIUM SERPL-MCNC: 9.7 MG/DL (ref 8.8–10.2)
CHLORIDE SERPL-SCNC: 107 MMOL/L (ref 98–111)
CHOLEST SERPL-MCNC: 133 MG/DL (ref 160–199)
CLARITY UR: CLEAR
CO2 SERPL-SCNC: 25 MMOL/L (ref 22–29)
COLOR UR: ABNORMAL
CREAT SERPL-MCNC: 1.2 MG/DL (ref 0.5–1.2)
ERYTHROCYTE [DISTWIDTH] IN BLOOD BY AUTOMATED COUNT: 12.5 % (ref 11.5–14.5)
GLUCOSE SERPL-MCNC: 97 MG/DL (ref 74–109)
GLUCOSE UR STRIP.AUTO-MCNC: NEGATIVE MG/DL
HCT VFR BLD AUTO: 41.3 % (ref 42–52)
HDLC SERPL-MCNC: 45 MG/DL (ref 55–121)
HGB BLD-MCNC: 14.1 G/DL (ref 14–18)
HGB UR STRIP.AUTO-MCNC: NEGATIVE MG/L
KETONES UR STRIP.AUTO-MCNC: NEGATIVE MG/DL
LDLC SERPL CALC-MCNC: 70 MG/DL
LEUKOCYTE ESTERASE UR QL STRIP.AUTO: NEGATIVE
MCH RBC QN AUTO: 32.9 PG (ref 27–31)
MCHC RBC AUTO-ENTMCNC: 34.1 G/DL (ref 33–37)
MCV RBC AUTO: 96.5 FL (ref 80–94)
NITRITE UR QL STRIP.AUTO: NEGATIVE
PH UR STRIP.AUTO: 5.5 [PH] (ref 5–8)
PLATELET # BLD AUTO: 156 K/UL (ref 130–400)
PMV BLD AUTO: 10.9 FL (ref 9.4–12.4)
POTASSIUM SERPL-SCNC: 5.2 MMOL/L (ref 3.5–5)
PROT SERPL-MCNC: 7.1 G/DL (ref 6.6–8.7)
PROT UR STRIP.AUTO-MCNC: NEGATIVE MG/DL
RBC # BLD AUTO: 4.28 M/UL (ref 4.7–6.1)
SODIUM SERPL-SCNC: 142 MMOL/L (ref 136–145)
SP GR UR STRIP.AUTO: 1.02 (ref 1–1.03)
TRIGL SERPL-MCNC: 88 MG/DL (ref 0–149)
TSH SERPL DL<=0.005 MIU/L-ACNC: 2.12 UIU/ML (ref 0.27–4.2)
UROBILINOGEN UR STRIP.AUTO-MCNC: 0.2 E.U./DL
WBC # BLD AUTO: 6 K/UL (ref 4.8–10.8)

## 2023-05-18 PROCEDURE — G8427 DOCREV CUR MEDS BY ELIG CLIN: HCPCS | Performed by: FAMILY MEDICINE

## 2023-05-18 PROCEDURE — 1036F TOBACCO NON-USER: CPT | Performed by: FAMILY MEDICINE

## 2023-05-18 PROCEDURE — G8417 CALC BMI ABV UP PARAM F/U: HCPCS | Performed by: FAMILY MEDICINE

## 2023-05-18 PROCEDURE — 1124F ACP DISCUSS-NO DSCNMKR DOCD: CPT | Performed by: FAMILY MEDICINE

## 2023-05-18 PROCEDURE — 3017F COLORECTAL CA SCREEN DOC REV: CPT | Performed by: FAMILY MEDICINE

## 2023-05-18 PROCEDURE — 99214 OFFICE O/P EST MOD 30 MIN: CPT | Performed by: FAMILY MEDICINE

## 2023-05-18 ASSESSMENT — PATIENT HEALTH QUESTIONNAIRE - PHQ9
2. FEELING DOWN, DEPRESSED OR HOPELESS: 0
SUM OF ALL RESPONSES TO PHQ QUESTIONS 1-9: 0
SUM OF ALL RESPONSES TO PHQ QUESTIONS 1-9: 0
SUM OF ALL RESPONSES TO PHQ9 QUESTIONS 1 & 2: 0
SUM OF ALL RESPONSES TO PHQ QUESTIONS 1-9: 0
SUM OF ALL RESPONSES TO PHQ QUESTIONS 1-9: 0
1. LITTLE INTEREST OR PLEASURE IN DOING THINGS: 0

## 2023-05-18 ASSESSMENT — ENCOUNTER SYMPTOMS
GASTROINTESTINAL NEGATIVE: 1
RESPIRATORY NEGATIVE: 1
EYES NEGATIVE: 1
ALLERGIC/IMMUNOLOGIC NEGATIVE: 1

## 2023-05-18 NOTE — PROGRESS NOTES
SUBJECTIVE:    Patient ID: Paula Mason is a 74 y. o.male. HPI:   Patient here for follow-up of multiple medical problems  Patient is a 43-year-old male. He have past medical history significant for CHF. He takes Entresto. He monitor his weight. His breathing has been stable. Denies any major fluid retention. He has been doing well from the standpoint. He has been compliant with regimen. He also take cholesterol medication. He is due for blood work. Denies medication side effect. He has history of peripheral vascular disease and toe amputation. He takes Trental.  He has been doing good from the standpoint. No new ulcers.        Past Medical History:   Diagnosis Date    AAA (abdominal aortic aneurysm)     CAD (coronary artery disease)     CARDIAC STENTS X 4    CHF (congestive heart failure) (HCC)     Gout     History of acute congestive heart failure     Hyperlipidemia     Hypertension     Neuropathy involving both lower extremities       Current Outpatient Medications   Medication Sig Dispense Refill    indomethacin (INDOCIN) 50 MG capsule TAKE 1 CAPSULE BY MOUTH 3 TIMES A DAY FOR 2 DAYS THEN 1 TWICE A DAY FOR 2 DAYS THEN 1 DAILY 30 capsule 0    furosemide (LASIX) 20 MG tablet Take 1 tablet by mouth daily 90 tablet 3    atorvastatin (LIPITOR) 40 MG tablet TAKE 1 TABLET BY MOUTH EVERY DAY AT NIGHT 90 tablet 3    carvedilol (COREG) 6.25 MG tablet Take 1 tablet by mouth 2 times daily 180 tablet 3    clopidogrel (PLAVIX) 75 MG tablet Take 1 tablet by mouth daily 90 tablet 3    pentoxifylline (TRENTAL) 400 MG extended release tablet TAKE 1 TABLET BY MOUTH 3 TIMES DAILY WITH MEALS. 270 tablet 3    sacubitril-valsartan (ENTRESTO) 24-26 MG per tablet TAKE 1 TABLET BY MOUTH TWICE A  tablet 3    Multiple Vitamins-Minerals (MULTIVITAMIN ADULT, MINERALS,) TABS Take 1 tablet by mouth daily 90 tablet 3    aspirin 81 MG chewable tablet TAKE 1 TABLET BY MOUTH EVERY DAY 90 tablet 3    nitroGLYCERIN (NITROSTAT) 0.4

## 2023-05-22 DIAGNOSIS — E87.5 HYPERKALEMIA: Primary | ICD-10-CM

## 2023-08-13 PROCEDURE — 93296 REM INTERROG EVL PM/IDS: CPT | Performed by: NURSE PRACTITIONER

## 2023-08-13 PROCEDURE — 93294 REM INTERROG EVL PM/LDLS PM: CPT | Performed by: NURSE PRACTITIONER

## 2023-08-14 DIAGNOSIS — Z95.0 PACEMAKER: Primary | ICD-10-CM

## 2023-08-14 DIAGNOSIS — I44.2 CHB (COMPLETE HEART BLOCK) (HCC): ICD-10-CM

## 2023-08-15 DIAGNOSIS — I50.9 CONGESTIVE HEART FAILURE, UNSPECIFIED HF CHRONICITY, UNSPECIFIED HEART FAILURE TYPE (HCC): ICD-10-CM

## 2023-08-15 DIAGNOSIS — I73.9 PERIPHERAL VASCULAR DISEASE (HCC): ICD-10-CM

## 2023-08-15 RX ORDER — CLOPIDOGREL BISULFATE 75 MG/1
TABLET ORAL
Qty: 90 TABLET | Refills: 3 | Status: SHIPPED | OUTPATIENT
Start: 2023-08-15

## 2023-08-15 RX ORDER — CARVEDILOL 6.25 MG/1
TABLET ORAL
Qty: 180 TABLET | Refills: 3 | Status: SHIPPED | OUTPATIENT
Start: 2023-08-15

## 2023-08-15 RX ORDER — ATORVASTATIN CALCIUM 40 MG/1
TABLET, FILM COATED ORAL
Qty: 90 TABLET | Refills: 3 | Status: SHIPPED | OUTPATIENT
Start: 2023-08-15

## 2023-11-09 ENCOUNTER — TELEPHONE (OUTPATIENT)
Dept: CARDIOLOGY CLINIC | Age: 73
End: 2023-11-09

## 2023-11-14 DIAGNOSIS — Z95.5 HX OF HEART ARTERY STENT: ICD-10-CM

## 2023-11-14 RX ORDER — NITROGLYCERIN 0.4 MG/1
TABLET SUBLINGUAL
Qty: 25 TABLET | Refills: 1 | Status: SHIPPED | OUTPATIENT
Start: 2023-11-14

## 2023-11-15 ENCOUNTER — OFFICE VISIT (OUTPATIENT)
Dept: CARDIOLOGY CLINIC | Age: 73
End: 2023-11-15
Payer: COMMERCIAL

## 2023-11-15 VITALS
WEIGHT: 252 LBS | BODY MASS INDEX: 31.33 KG/M2 | SYSTOLIC BLOOD PRESSURE: 158 MMHG | DIASTOLIC BLOOD PRESSURE: 104 MMHG | HEART RATE: 73 BPM | HEIGHT: 75 IN

## 2023-11-15 DIAGNOSIS — I10 ESSENTIAL HYPERTENSION: ICD-10-CM

## 2023-11-15 DIAGNOSIS — Z95.0 PACEMAKER: ICD-10-CM

## 2023-11-15 DIAGNOSIS — I25.10 CORONARY ARTERY DISEASE INVOLVING NATIVE CORONARY ARTERY OF NATIVE HEART WITHOUT ANGINA PECTORIS: Primary | ICD-10-CM

## 2023-11-15 DIAGNOSIS — E78.2 MIXED HYPERLIPIDEMIA: ICD-10-CM

## 2023-11-15 DIAGNOSIS — I50.22 CHRONIC SYSTOLIC CHF (CONGESTIVE HEART FAILURE) (HCC): ICD-10-CM

## 2023-11-15 DIAGNOSIS — I44.2 CHB (COMPLETE HEART BLOCK) (HCC): ICD-10-CM

## 2023-11-15 PROCEDURE — 3080F DIAST BP >= 90 MM HG: CPT | Performed by: CLINICAL NURSE SPECIALIST

## 2023-11-15 PROCEDURE — 1124F ACP DISCUSS-NO DSCNMKR DOCD: CPT | Performed by: CLINICAL NURSE SPECIALIST

## 2023-11-15 PROCEDURE — 93280 PM DEVICE PROGR EVAL DUAL: CPT | Performed by: CLINICAL NURSE SPECIALIST

## 2023-11-15 PROCEDURE — 3077F SYST BP >= 140 MM HG: CPT | Performed by: CLINICAL NURSE SPECIALIST

## 2023-11-15 PROCEDURE — 99214 OFFICE O/P EST MOD 30 MIN: CPT | Performed by: CLINICAL NURSE SPECIALIST

## 2023-11-15 RX ORDER — NITROGLYCERIN 0.4 MG/1
TABLET SUBLINGUAL
Qty: 25 TABLET | Refills: 1 | OUTPATIENT
Start: 2023-11-15

## 2023-11-15 RX ORDER — CARVEDILOL 12.5 MG/1
12.5 TABLET ORAL 2 TIMES DAILY
Qty: 180 TABLET | Refills: 3 | Status: SHIPPED | OUTPATIENT
Start: 2023-11-15

## 2023-11-15 RX ORDER — CLONIDINE HYDROCHLORIDE 0.1 MG/1
0.1 TABLET ORAL 4 TIMES DAILY PRN
Qty: 30 TABLET | Refills: 1 | Status: SHIPPED | OUTPATIENT
Start: 2023-11-15

## 2023-11-15 ASSESSMENT — ENCOUNTER SYMPTOMS
VOMITING: 0
EYE REDNESS: 0
FACIAL SWELLING: 0
CHEST TIGHTNESS: 0
NAUSEA: 0
ABDOMINAL PAIN: 0
WHEEZING: 0
COUGH: 0
SHORTNESS OF BREATH: 1

## 2023-11-15 NOTE — PATIENT INSTRUCTIONS
Return in about 6 weeks (around 12/27/2023) for APRN. Increase Carvedilol 12.5mg twice a day    Clonidine 0.1mg as needed for > 160/90, not to exceed 4 doses in 24 hrs    Get back on Lasix (furosemide)    - Weigh daily and report weight gain of 3lbs or more in 24hrs or 5lbs in one week. - Call for increasing shortness of breath or increasing swelling in feet and legs.     (This could mean you are retaining too much fluid)  - 2000mg low sodium diet  - Fluid restriction of 1500ml per day (about 6 cups of fluid per day)

## 2023-11-15 NOTE — PROGRESS NOTES
TRIG 88 05/18/2023    HDL 45 (L) 05/18/2023    LDLCALC 70 05/18/2023     Lab Results   Component Value Date    ALT 19 05/18/2023    AST 16 05/18/2023     Lab Results   Component Value Date/Time     05/18/2023 11:22 AM    K 5.2 05/18/2023 11:22 AM    K 4.1 11/08/2021 03:34 AM     05/18/2023 11:22 AM    CO2 25 05/18/2023 11:22 AM    BUN 26 05/18/2023 11:22 AM    CREATININE 1.2 05/18/2023 11:22 AM    GLUCOSE 97 05/18/2023 11:22 AM    CALCIUM 9.7 05/18/2023 11:22 AM      Echo 6/17/20  Conclusions    Summary   Left ventricular size is mildly increased. Generalized hypokinesis of the left ventricle with impairment of LV   systolic function. Left ventricular ejection fraction is visually estimated at 45%. E/A flow reversal noted. Suggestive of diastolic dysfunction. Echo 11/9/21  Conclusions      Summary   LV is moderate to severely dilated with moderately depressed LV systolic   function. LV ejection fraction estimated at 40 to 45%. Grade 2 diastolic dysfunction. Mild concentric LVH. RV appears normal in size with normal systolic function. Mild left atrial enlargement. Normal right atrial size. Aortic valve is trileaflet with normal leaflet mobility. No stenosis or   significant regurgitation. Mitral valve is mildly thickened with normal leaflet mobility. Trace   mitral regurgitation. No stenosis. No significant pericardial effusion. Pacer wires noted in right-sided chambers. Signature      ----------------------------------------------------------------   Electronically signed by Barrett Layne MD(Interpreting physician)   on 11/09/2021 10:05    Assessment:     Diagnosis Orders   1. Coronary artery disease involving native coronary artery of native heart without angina pectoris        2. Chronic systolic CHF (congestive heart failure) ([address])        3. Essential hypertension        4. Mixed hyperlipidemia        5. CHB (complete heart block) (HCC)        6.  Pacemaker            CAD-

## 2023-11-23 DIAGNOSIS — I50.9 CONGESTIVE HEART FAILURE, UNSPECIFIED HF CHRONICITY, UNSPECIFIED HEART FAILURE TYPE (HCC): ICD-10-CM

## 2023-11-24 DIAGNOSIS — I50.9 HEART FAILURE, UNSPECIFIED (HCC): ICD-10-CM

## 2023-11-25 DIAGNOSIS — I73.9 PERIPHERAL VASCULAR DISEASE (HCC): ICD-10-CM

## 2023-11-27 RX ORDER — PENTOXIFYLLINE 400 MG/1
TABLET, EXTENDED RELEASE ORAL
Qty: 270 TABLET | Refills: 3 | Status: SHIPPED | OUTPATIENT
Start: 2023-11-27

## 2023-11-27 RX ORDER — SACUBITRIL AND VALSARTAN 24; 26 MG/1; MG/1
TABLET, FILM COATED ORAL
Qty: 180 TABLET | Refills: 3 | Status: SHIPPED | OUTPATIENT
Start: 2023-11-27

## 2023-11-27 RX ORDER — FUROSEMIDE 20 MG/1
20 TABLET ORAL DAILY
Qty: 90 TABLET | Refills: 3 | Status: SHIPPED | OUTPATIENT
Start: 2023-11-27

## 2024-01-16 NOTE — PROGRESS NOTES
Renzo Dejesus (:  1950) is a 73 y.o. male,Established patient, here for evaluation of the following chief complaint(s):  1 Year Follow Up            SUBJECTIVE/OBJECTIVE:  He has a known history of abdominal aortic aneurysm for 1 - 5 years.    He has not had abdominal pain.  He has not had back pain in the cervical spine, thoracic spine, lumbar spine, and sacral spine region. This pain is unchanged since the last visit. Pain is rated as 0.         I have personally reviewed the following: problem list, current meds, allergies, PMH, PSH, family hx, and social hx  Renzo Dejesus is a 73 y.o. male with the following history as recorded in [facility]:  Patient Active Problem List    Diagnosis Date Noted    History of amputation of lesser toe of right foot ([facility]) 2021    Open toe wound 2019    Chronic systolic CHF (congestive heart failure) ([facility]) 2019    Coronary artery disease involving native coronary artery of native heart without angina pectoris 2019    Hypotension due to drugs 2019    AAA (abdominal aortic aneurysm) without rupture ([facility]) 2019    History of acute congestive heart failure     NSTEMI (non-ST elevated myocardial infarction) ([facility]) 2019    Acute pain of right shoulder 2019    Hearing aid worn 10/03/2017    Idiopathic peripheral neuropathy 2016    Benign nodular prostatic hyperplasia without lower urinary tract symptoms 2016    Peripheral vascular disease (HCC) 2016    Tinea pedis of left foot 2016    Neuropathy 03/15/2016    Gout 2014    Anxiety 2014    Mixed hyperlipidemia 2014     Current Outpatient Medications   Medication Sig Dispense Refill    furosemide (LASIX) 20 MG tablet TAKE 1 TABLET BY MOUTH EVERY DAY 90 tablet 3    ENTRESTO 24-26 MG per tablet TAKE 1 TABLET BY MOUTH TWICE A  tablet 3    pentoxifylline (TRENTAL) 400 MG extended release tablet TAKE 1 TABLET BY MOUTH 3 TIMES DAILY WITH MEALS. 270

## 2024-01-22 ENCOUNTER — OFFICE VISIT (OUTPATIENT)
Dept: VASCULAR SURGERY | Age: 74
End: 2024-01-22
Payer: COMMERCIAL

## 2024-01-22 ENCOUNTER — HOSPITAL ENCOUNTER (OUTPATIENT)
Dept: ULTRASOUND IMAGING | Age: 74
Discharge: HOME | End: 2024-01-22

## 2024-01-22 VITALS
DIASTOLIC BLOOD PRESSURE: 74 MMHG | TEMPERATURE: 97.1 F | SYSTOLIC BLOOD PRESSURE: 120 MMHG | OXYGEN SATURATION: 99 % | HEART RATE: 74 BPM

## 2024-01-22 DIAGNOSIS — I71.43 INFRARENAL ABDOMINAL AORTIC ANEURYSM (AAA) WITHOUT RUPTURE (HCC): Primary | ICD-10-CM

## 2024-01-22 DIAGNOSIS — I71.40 AAA (ABDOMINAL AORTIC ANEURYSM) WITHOUT RUPTURE (HCC): ICD-10-CM

## 2024-01-22 PROCEDURE — 99214 OFFICE O/P EST MOD 30 MIN: CPT | Performed by: NURSE PRACTITIONER

## 2024-01-22 PROCEDURE — 93978 VASCULAR STUDY: CPT

## 2024-01-22 PROCEDURE — 1124F ACP DISCUSS-NO DSCNMKR DOCD: CPT | Performed by: NURSE PRACTITIONER

## 2024-01-28 DIAGNOSIS — M10.9 ACUTE GOUT OF LEFT WRIST, UNSPECIFIED CAUSE: ICD-10-CM

## 2024-01-29 RX ORDER — INDOMETHACIN 50 MG/1
CAPSULE ORAL
Qty: 30 CAPSULE | Refills: 0 | Status: SHIPPED | OUTPATIENT
Start: 2024-01-29

## 2024-02-13 PROCEDURE — 93294 REM INTERROG EVL PM/LDLS PM: CPT | Performed by: CLINICAL NURSE SPECIALIST

## 2024-02-14 DIAGNOSIS — Z95.0 PACEMAKER: Primary | ICD-10-CM

## 2024-02-14 DIAGNOSIS — I44.2 CHB (COMPLETE HEART BLOCK) (HCC): ICD-10-CM

## 2024-03-06 ENCOUNTER — OFFICE VISIT (OUTPATIENT)
Dept: FAMILY MEDICINE CLINIC | Age: 74
End: 2024-03-06
Payer: COMMERCIAL

## 2024-03-06 VITALS
WEIGHT: 258 LBS | OXYGEN SATURATION: 98 % | BODY MASS INDEX: 32.08 KG/M2 | DIASTOLIC BLOOD PRESSURE: 70 MMHG | SYSTOLIC BLOOD PRESSURE: 124 MMHG | HEIGHT: 75 IN | HEART RATE: 72 BPM | TEMPERATURE: 97.1 F

## 2024-03-06 DIAGNOSIS — I50.9 CONGESTIVE HEART FAILURE, UNSPECIFIED HF CHRONICITY, UNSPECIFIED HEART FAILURE TYPE (HCC): ICD-10-CM

## 2024-03-06 DIAGNOSIS — Z89.421 HISTORY OF AMPUTATION OF LESSER TOE OF RIGHT FOOT (HCC): ICD-10-CM

## 2024-03-06 DIAGNOSIS — R36.1 HEMATOSPERMIA: Primary | ICD-10-CM

## 2024-03-06 DIAGNOSIS — N52.9 ERECTILE DYSFUNCTION, UNSPECIFIED ERECTILE DYSFUNCTION TYPE: ICD-10-CM

## 2024-03-06 DIAGNOSIS — R36.1 HEMATOSPERMIA: ICD-10-CM

## 2024-03-06 LAB
ALBUMIN SERPL-MCNC: 4.4 G/DL (ref 3.5–5.2)
ALP SERPL-CCNC: 85 U/L (ref 40–130)
ALT SERPL-CCNC: 23 U/L (ref 5–41)
ANION GAP SERPL CALCULATED.3IONS-SCNC: 11 MMOL/L (ref 7–19)
AST SERPL-CCNC: 18 U/L (ref 5–40)
BACTERIA URNS QL MICRO: NEGATIVE /HPF
BILIRUB SERPL-MCNC: 1.1 MG/DL (ref 0.2–1.2)
BILIRUB UR QL STRIP: NEGATIVE
BUN SERPL-MCNC: 17 MG/DL (ref 8–23)
CALCIUM SERPL-MCNC: 9.5 MG/DL (ref 8.8–10.2)
CHLORIDE SERPL-SCNC: 107 MMOL/L (ref 98–111)
CHOLEST SERPL-MCNC: 136 MG/DL (ref 160–199)
CLARITY UR: CLEAR
CO2 SERPL-SCNC: 22 MMOL/L (ref 22–29)
COLOR UR: YELLOW
CREAT SERPL-MCNC: 1 MG/DL (ref 0.5–1.2)
CRYSTALS URNS MICRO: ABNORMAL /HPF
EPI CELLS #/AREA URNS AUTO: 0 /HPF (ref 0–5)
ERYTHROCYTE [DISTWIDTH] IN BLOOD BY AUTOMATED COUNT: 12.3 % (ref 11.5–14.5)
GLUCOSE SERPL-MCNC: 97 MG/DL (ref 74–109)
GLUCOSE UR STRIP.AUTO-MCNC: NEGATIVE MG/DL
HCT VFR BLD AUTO: 41.3 % (ref 42–52)
HDLC SERPL-MCNC: 48 MG/DL (ref 55–121)
HGB BLD-MCNC: 13.9 G/DL (ref 14–18)
HGB UR STRIP.AUTO-MCNC: NEGATIVE MG/L
HYALINE CASTS #/AREA URNS AUTO: 0 /HPF (ref 0–8)
KETONES UR STRIP.AUTO-MCNC: NEGATIVE MG/DL
LDLC SERPL CALC-MCNC: 67 MG/DL
LEUKOCYTE ESTERASE UR QL STRIP.AUTO: ABNORMAL
MCH RBC QN AUTO: 32.5 PG (ref 27–31)
MCHC RBC AUTO-ENTMCNC: 33.7 G/DL (ref 33–37)
MCV RBC AUTO: 96.5 FL (ref 80–94)
NITRITE UR QL STRIP.AUTO: NEGATIVE
PH UR STRIP.AUTO: 6.5 [PH] (ref 5–8)
PLATELET # BLD AUTO: 153 K/UL (ref 130–400)
PMV BLD AUTO: 10.2 FL (ref 9.4–12.4)
POTASSIUM SERPL-SCNC: 4.8 MMOL/L (ref 3.5–5)
PROT SERPL-MCNC: 7.2 G/DL (ref 6.6–8.7)
PROT UR STRIP.AUTO-MCNC: NEGATIVE MG/DL
PSA SERPL-MCNC: 1.79 NG/ML (ref 0–4)
RBC # BLD AUTO: 4.28 M/UL (ref 4.7–6.1)
RBC #/AREA URNS AUTO: 0 /HPF (ref 0–4)
SODIUM SERPL-SCNC: 140 MMOL/L (ref 136–145)
SP GR UR STRIP.AUTO: 1.01 (ref 1–1.03)
TRIGL SERPL-MCNC: 103 MG/DL (ref 0–149)
TSH SERPL DL<=0.005 MIU/L-ACNC: 2.66 UIU/ML (ref 0.27–4.2)
UROBILINOGEN UR STRIP.AUTO-MCNC: 0.2 E.U./DL
WBC # BLD AUTO: 5.4 K/UL (ref 4.8–10.8)
WBC #/AREA URNS AUTO: 4 /HPF (ref 0–5)

## 2024-03-06 PROCEDURE — 1124F ACP DISCUSS-NO DSCNMKR DOCD: CPT | Performed by: FAMILY MEDICINE

## 2024-03-06 PROCEDURE — 99214 OFFICE O/P EST MOD 30 MIN: CPT | Performed by: FAMILY MEDICINE

## 2024-03-06 RX ORDER — CARVEDILOL 6.25 MG/1
6.25 TABLET ORAL 2 TIMES DAILY WITH MEALS
COMMUNITY

## 2024-03-06 RX ORDER — TADALAFIL 20 MG/1
20 TABLET ORAL PRN
Qty: 6 TABLET | Refills: 5 | Status: SHIPPED | OUTPATIENT
Start: 2024-03-06

## 2024-03-06 SDOH — ECONOMIC STABILITY: HOUSING INSECURITY
IN THE LAST 12 MONTHS, WAS THERE A TIME WHEN YOU DID NOT HAVE A STEADY PLACE TO SLEEP OR SLEPT IN A SHELTER (INCLUDING NOW)?: NO

## 2024-03-06 SDOH — ECONOMIC STABILITY: FOOD INSECURITY: WITHIN THE PAST 12 MONTHS, YOU WORRIED THAT YOUR FOOD WOULD RUN OUT BEFORE YOU GOT MONEY TO BUY MORE.: NEVER TRUE

## 2024-03-06 SDOH — ECONOMIC STABILITY: INCOME INSECURITY: HOW HARD IS IT FOR YOU TO PAY FOR THE VERY BASICS LIKE FOOD, HOUSING, MEDICAL CARE, AND HEATING?: NOT VERY HARD

## 2024-03-06 SDOH — ECONOMIC STABILITY: FOOD INSECURITY: WITHIN THE PAST 12 MONTHS, THE FOOD YOU BOUGHT JUST DIDN'T LAST AND YOU DIDN'T HAVE MONEY TO GET MORE.: NEVER TRUE

## 2024-03-06 ASSESSMENT — PATIENT HEALTH QUESTIONNAIRE - PHQ9
SUM OF ALL RESPONSES TO PHQ QUESTIONS 1-9: 0
1. LITTLE INTEREST OR PLEASURE IN DOING THINGS: 0
SUM OF ALL RESPONSES TO PHQ QUESTIONS 1-9: 0
SUM OF ALL RESPONSES TO PHQ QUESTIONS 1-9: 0
2. FEELING DOWN, DEPRESSED OR HOPELESS: 0
SUM OF ALL RESPONSES TO PHQ QUESTIONS 1-9: 0
SUM OF ALL RESPONSES TO PHQ9 QUESTIONS 1 & 2: 0

## 2024-03-06 ASSESSMENT — ENCOUNTER SYMPTOMS
GASTROINTESTINAL NEGATIVE: 1
ALLERGIC/IMMUNOLOGIC NEGATIVE: 1
EYES NEGATIVE: 1
RESPIRATORY NEGATIVE: 1

## 2024-03-06 NOTE — PROGRESS NOTES
likely benign and secondary to Plavix use PSA, Diagnostic      2. Erectile dysfunction, unspecified erectile dysfunction type-not controlled tadalafil (CIALIS) 20 MG tablet      3. History of amputation of lesser toe of right foot (HCC)-stable       4. Congestive heart failure, unspecified HF chronicity, unspecified heart failure type (HCC)-stable CBC    Comprehensive Metabolic Panel    Lipid Panel    TSH    Urinalysis with Reflex to Culture           PLAN:    1.  Obtain PSA.  Urine culture.  Reassurance.  If worsens or frequent will refer to urology  2.  Trial of Cialis  3.  No further intervention.  Continue to follow-up with vascular.  4.  Continue treatment plan and follow-up with cardiology.  Follow-up 6 months

## 2024-03-11 DIAGNOSIS — M10.9 ACUTE GOUT OF LEFT WRIST, UNSPECIFIED CAUSE: ICD-10-CM

## 2024-03-11 DIAGNOSIS — F41.9 ANXIETY: Primary | ICD-10-CM

## 2024-03-11 DIAGNOSIS — G62.9 NEUROPATHY: ICD-10-CM

## 2024-03-11 RX ORDER — INDOMETHACIN 50 MG/1
CAPSULE ORAL
Qty: 30 CAPSULE | Refills: 0 | Status: SHIPPED | OUTPATIENT
Start: 2024-03-11

## 2024-03-12 DIAGNOSIS — G62.9 NEUROPATHY: ICD-10-CM

## 2024-03-12 DIAGNOSIS — F41.9 ANXIETY: ICD-10-CM

## 2024-03-12 LAB
FOLATE SERPL-MCNC: 12.5 NG/ML (ref 4.5–32.2)
VIT B12 SERPL-MCNC: 1020 PG/ML (ref 211–946)

## 2024-03-22 DIAGNOSIS — M10.9 ACUTE GOUT OF LEFT WRIST, UNSPECIFIED CAUSE: ICD-10-CM

## 2024-03-22 RX ORDER — INDOMETHACIN 50 MG/1
CAPSULE ORAL
Qty: 30 CAPSULE | Refills: 0 | Status: SHIPPED | OUTPATIENT
Start: 2024-03-22

## 2024-05-09 DIAGNOSIS — I50.9 HEART FAILURE, UNSPECIFIED (HCC): ICD-10-CM

## 2024-05-09 DIAGNOSIS — I50.9 CONGESTIVE HEART FAILURE, UNSPECIFIED HF CHRONICITY, UNSPECIFIED HEART FAILURE TYPE (HCC): ICD-10-CM

## 2024-05-09 DIAGNOSIS — I73.9 PERIPHERAL VASCULAR DISEASE (HCC): ICD-10-CM

## 2024-05-09 RX ORDER — SACUBITRIL AND VALSARTAN 24; 26 MG/1; MG/1
1 TABLET, FILM COATED ORAL 2 TIMES DAILY
Qty: 180 TABLET | Refills: 3 | OUTPATIENT
Start: 2024-05-09

## 2024-05-09 RX ORDER — ATORVASTATIN CALCIUM 40 MG/1
40 TABLET, FILM COATED ORAL NIGHTLY
Qty: 90 TABLET | Refills: 3 | Status: SHIPPED | OUTPATIENT
Start: 2024-05-09

## 2024-05-09 RX ORDER — SACUBITRIL AND VALSARTAN 24; 26 MG/1; MG/1
1 TABLET, FILM COATED ORAL 2 TIMES DAILY
Qty: 180 TABLET | Refills: 3 | Status: SHIPPED | OUTPATIENT
Start: 2024-05-09

## 2024-05-16 DIAGNOSIS — I44.2 CHB (COMPLETE HEART BLOCK) (HCC): ICD-10-CM

## 2024-05-16 DIAGNOSIS — Z95.0 PACEMAKER: Primary | ICD-10-CM

## 2024-06-17 DIAGNOSIS — M10.9 ACUTE GOUT OF LEFT WRIST, UNSPECIFIED CAUSE: ICD-10-CM

## 2024-06-17 RX ORDER — INDOMETHACIN 50 MG/1
CAPSULE ORAL
Qty: 30 CAPSULE | Refills: 2 | Status: SHIPPED | OUTPATIENT
Start: 2024-06-17

## 2024-06-18 DIAGNOSIS — I50.9 CONGESTIVE HEART FAILURE, UNSPECIFIED HF CHRONICITY, UNSPECIFIED HEART FAILURE TYPE (HCC): ICD-10-CM

## 2024-06-18 DIAGNOSIS — I73.9 PERIPHERAL VASCULAR DISEASE (HCC): ICD-10-CM

## 2024-06-18 RX ORDER — ATORVASTATIN CALCIUM 40 MG/1
40 TABLET, FILM COATED ORAL NIGHTLY
Qty: 90 TABLET | Refills: 3 | Status: SHIPPED | OUTPATIENT
Start: 2024-06-18

## 2024-06-18 RX ORDER — PENTOXIFYLLINE 400 MG/1
400 TABLET, EXTENDED RELEASE ORAL
Qty: 270 TABLET | Refills: 3 | Status: SHIPPED | OUTPATIENT
Start: 2024-06-18

## 2024-06-18 RX ORDER — CLOPIDOGREL BISULFATE 75 MG/1
75 TABLET ORAL DAILY
Qty: 90 TABLET | Refills: 3 | Status: SHIPPED | OUTPATIENT
Start: 2024-06-18

## 2024-06-19 DIAGNOSIS — I73.9 PERIPHERAL VASCULAR DISEASE (HCC): ICD-10-CM

## 2024-06-19 DIAGNOSIS — I50.9 CONGESTIVE HEART FAILURE, UNSPECIFIED HF CHRONICITY, UNSPECIFIED HEART FAILURE TYPE (HCC): ICD-10-CM

## 2024-06-20 RX ORDER — ATORVASTATIN CALCIUM 40 MG/1
40 TABLET, FILM COATED ORAL NIGHTLY
Qty: 90 TABLET | Refills: 3 | OUTPATIENT
Start: 2024-06-20

## 2024-08-12 ENCOUNTER — OFFICE VISIT (OUTPATIENT)
Dept: FAMILY MEDICINE CLINIC | Age: 74
End: 2024-08-12
Payer: MEDICARE

## 2024-08-12 VITALS
DIASTOLIC BLOOD PRESSURE: 80 MMHG | HEIGHT: 72 IN | OXYGEN SATURATION: 98 % | BODY MASS INDEX: 34.99 KG/M2 | TEMPERATURE: 97.4 F | HEART RATE: 85 BPM | SYSTOLIC BLOOD PRESSURE: 130 MMHG

## 2024-08-12 DIAGNOSIS — I73.9 PERIPHERAL VASCULAR DISEASE (HCC): ICD-10-CM

## 2024-08-12 DIAGNOSIS — E78.2 MIXED HYPERLIPIDEMIA: ICD-10-CM

## 2024-08-12 DIAGNOSIS — I50.9 CONGESTIVE HEART FAILURE, UNSPECIFIED HF CHRONICITY, UNSPECIFIED HEART FAILURE TYPE (HCC): Primary | ICD-10-CM

## 2024-08-12 DIAGNOSIS — I50.9 CONGESTIVE HEART FAILURE, UNSPECIFIED HF CHRONICITY, UNSPECIFIED HEART FAILURE TYPE (HCC): ICD-10-CM

## 2024-08-12 LAB
ALBUMIN SERPL-MCNC: 4.2 G/DL (ref 3.5–5.2)
ALP SERPL-CCNC: 83 U/L (ref 40–130)
ALT SERPL-CCNC: 29 U/L (ref 5–41)
ANION GAP SERPL CALCULATED.3IONS-SCNC: 13 MMOL/L (ref 7–19)
AST SERPL-CCNC: 21 U/L (ref 5–40)
BILIRUB SERPL-MCNC: 0.8 MG/DL (ref 0.2–1.2)
BUN SERPL-MCNC: 13 MG/DL (ref 8–23)
CALCIUM SERPL-MCNC: 9 MG/DL (ref 8.8–10.2)
CHLORIDE SERPL-SCNC: 106 MMOL/L (ref 98–111)
CHOLEST SERPL-MCNC: 142 MG/DL (ref 160–199)
CO2 SERPL-SCNC: 23 MMOL/L (ref 22–29)
CREAT SERPL-MCNC: 1 MG/DL (ref 0.5–1.2)
ERYTHROCYTE [DISTWIDTH] IN BLOOD BY AUTOMATED COUNT: 13.2 % (ref 11.5–14.5)
GLUCOSE SERPL-MCNC: 88 MG/DL (ref 74–109)
HCT VFR BLD AUTO: 42.3 % (ref 42–52)
HDLC SERPL-MCNC: 50 MG/DL (ref 55–121)
HGB BLD-MCNC: 14 G/DL (ref 14–18)
LDLC SERPL CALC-MCNC: 65 MG/DL
MCH RBC QN AUTO: 32.9 PG (ref 27–31)
MCHC RBC AUTO-ENTMCNC: 33.1 G/DL (ref 33–37)
MCV RBC AUTO: 99.3 FL (ref 80–94)
PLATELET # BLD AUTO: 144 K/UL (ref 130–400)
PMV BLD AUTO: 10.6 FL (ref 9.4–12.4)
POTASSIUM SERPL-SCNC: 4.6 MMOL/L (ref 3.5–5)
PROT SERPL-MCNC: 7 G/DL (ref 6.6–8.7)
RBC # BLD AUTO: 4.26 M/UL (ref 4.7–6.1)
SODIUM SERPL-SCNC: 142 MMOL/L (ref 136–145)
TRIGL SERPL-MCNC: 137 MG/DL (ref 0–149)
WBC # BLD AUTO: 5.9 K/UL (ref 4.8–10.8)

## 2024-08-12 PROCEDURE — G8417 CALC BMI ABV UP PARAM F/U: HCPCS | Performed by: FAMILY MEDICINE

## 2024-08-12 PROCEDURE — 1124F ACP DISCUSS-NO DSCNMKR DOCD: CPT | Performed by: FAMILY MEDICINE

## 2024-08-12 PROCEDURE — 1036F TOBACCO NON-USER: CPT | Performed by: FAMILY MEDICINE

## 2024-08-12 PROCEDURE — G8427 DOCREV CUR MEDS BY ELIG CLIN: HCPCS | Performed by: FAMILY MEDICINE

## 2024-08-12 PROCEDURE — 99214 OFFICE O/P EST MOD 30 MIN: CPT | Performed by: FAMILY MEDICINE

## 2024-08-12 SDOH — ECONOMIC STABILITY: FOOD INSECURITY: WITHIN THE PAST 12 MONTHS, YOU WORRIED THAT YOUR FOOD WOULD RUN OUT BEFORE YOU GOT MONEY TO BUY MORE.: NEVER TRUE

## 2024-08-12 SDOH — ECONOMIC STABILITY: FOOD INSECURITY: WITHIN THE PAST 12 MONTHS, THE FOOD YOU BOUGHT JUST DIDN'T LAST AND YOU DIDN'T HAVE MONEY TO GET MORE.: NEVER TRUE

## 2024-08-12 SDOH — ECONOMIC STABILITY: INCOME INSECURITY: HOW HARD IS IT FOR YOU TO PAY FOR THE VERY BASICS LIKE FOOD, HOUSING, MEDICAL CARE, AND HEATING?: NOT HARD AT ALL

## 2024-08-12 ASSESSMENT — ENCOUNTER SYMPTOMS
EYES NEGATIVE: 1
RESPIRATORY NEGATIVE: 1
ALLERGIC/IMMUNOLOGIC NEGATIVE: 1
GASTROINTESTINAL NEGATIVE: 1

## 2024-08-12 ASSESSMENT — PATIENT HEALTH QUESTIONNAIRE - PHQ9
SUM OF ALL RESPONSES TO PHQ QUESTIONS 1-9: 0
2. FEELING DOWN, DEPRESSED OR HOPELESS: NOT AT ALL
SUM OF ALL RESPONSES TO PHQ QUESTIONS 1-9: 0
SUM OF ALL RESPONSES TO PHQ9 QUESTIONS 1 & 2: 0
1. LITTLE INTEREST OR PLEASURE IN DOING THINGS: NOT AT ALL

## 2024-08-12 NOTE — PROGRESS NOTES
Radial pulses are 2+ on the right side and 2+ on the left side.      Heart sounds: Normal heart sounds, S1 normal and S2 normal. No murmur heard.  Pulmonary:      Effort: Pulmonary effort is normal. No accessory muscle usage.      Breath sounds: Normal breath sounds.   Abdominal:      General: Bowel sounds are normal. There is no distension or abdominal bruit.      Palpations: Abdomen is soft. There is no mass.      Tenderness: There is no abdominal tenderness.      Hernia: No hernia is present.   Musculoskeletal:         General: Normal range of motion.      Cervical back: Normal range of motion and neck supple.      Right lower leg: No edema.      Left lower leg: No edema.   Lymphadenopathy:      Cervical:      Right cervical: No superficial cervical adenopathy.     Left cervical: No superficial cervical adenopathy.   Skin:     General: Skin is warm and dry.      Coloration: Skin is not jaundiced or pale.      Findings: No lesion or rash.      Nails: There is no clubbing.   Neurological:      Mental Status: He is alert and oriented to person, place, and time.      Cranial Nerves: No facial asymmetry.      Motor: No weakness or tremor.      Coordination: Coordination normal.      Gait: Gait normal.      Deep Tendon Reflexes: Reflexes are normal and symmetric.   Psychiatric:         Attention and Perception: Attention normal.         Mood and Affect: Mood normal.         Speech: Speech normal.         Behavior: Behavior normal.         Thought Content: Thought content normal.         Cognition and Memory: Memory normal.         Judgment: Judgment normal.        /80 (Site: Left Upper Arm, Position: Sitting)   Pulse 85   Temp 97.4 °F (36.3 °C) (Temporal)   Ht 1.829 m (6')   SpO2 98%   BMI 34.99 kg/m²      ASSESSMENT:     Diagnosis Orders   1. Congestive heart failure, unspecified HF chronicity, unspecified heart failure type (HCC)-stable CBC    Comprehensive Metabolic Panel      2. Peripheral vascular

## 2024-08-13 DIAGNOSIS — I50.9 CONGESTIVE HEART FAILURE, UNSPECIFIED HF CHRONICITY, UNSPECIFIED HEART FAILURE TYPE (HCC): ICD-10-CM

## 2024-08-13 DIAGNOSIS — I50.9 HEART FAILURE, UNSPECIFIED (HCC): ICD-10-CM

## 2024-08-13 DIAGNOSIS — I73.9 PERIPHERAL VASCULAR DISEASE (HCC): ICD-10-CM

## 2024-08-13 DIAGNOSIS — M10.9 ACUTE GOUT OF LEFT WRIST, UNSPECIFIED CAUSE: ICD-10-CM

## 2024-08-13 RX ORDER — CLOPIDOGREL BISULFATE 75 MG/1
75 TABLET ORAL DAILY
Qty: 90 TABLET | Refills: 3 | Status: SHIPPED | OUTPATIENT
Start: 2024-08-13

## 2024-08-13 RX ORDER — FUROSEMIDE 20 MG/1
20 TABLET ORAL DAILY
Qty: 90 TABLET | Refills: 3 | Status: SHIPPED | OUTPATIENT
Start: 2024-08-13

## 2024-08-13 RX ORDER — SACUBITRIL AND VALSARTAN 24; 26 MG/1; MG/1
1 TABLET, FILM COATED ORAL 2 TIMES DAILY
Qty: 180 TABLET | Refills: 3 | Status: SHIPPED | OUTPATIENT
Start: 2024-08-13

## 2024-08-13 RX ORDER — ATORVASTATIN CALCIUM 40 MG/1
40 TABLET, FILM COATED ORAL NIGHTLY
Qty: 90 TABLET | Refills: 3 | Status: SHIPPED | OUTPATIENT
Start: 2024-08-13

## 2024-08-13 RX ORDER — INDOMETHACIN 50 MG/1
CAPSULE ORAL
Qty: 30 CAPSULE | Refills: 2 | Status: SHIPPED | OUTPATIENT
Start: 2024-08-13

## 2024-08-18 PROCEDURE — 93294 REM INTERROG EVL PM/LDLS PM: CPT | Performed by: CLINICAL NURSE SPECIALIST

## 2024-08-18 PROCEDURE — 93296 REM INTERROG EVL PM/IDS: CPT | Performed by: CLINICAL NURSE SPECIALIST

## 2024-08-19 ENCOUNTER — TELEPHONE (OUTPATIENT)
Dept: CARDIOLOGY CLINIC | Age: 74
End: 2024-08-19

## 2024-08-20 DIAGNOSIS — I44.2 CHB (COMPLETE HEART BLOCK) (HCC): ICD-10-CM

## 2024-08-20 DIAGNOSIS — Z95.0 PACEMAKER: Primary | ICD-10-CM

## 2024-11-18 DIAGNOSIS — I44.2 CHB (COMPLETE HEART BLOCK) (HCC): ICD-10-CM

## 2024-11-18 DIAGNOSIS — Z95.0 PACEMAKER: Primary | ICD-10-CM

## 2024-12-05 DIAGNOSIS — I73.9 PERIPHERAL VASCULAR DISEASE (HCC): ICD-10-CM

## 2024-12-06 DIAGNOSIS — I50.9 HEART FAILURE, UNSPECIFIED (HCC): ICD-10-CM

## 2024-12-06 DIAGNOSIS — I73.9 PERIPHERAL VASCULAR DISEASE (HCC): ICD-10-CM

## 2024-12-06 DIAGNOSIS — I50.9 CONGESTIVE HEART FAILURE, UNSPECIFIED HF CHRONICITY, UNSPECIFIED HEART FAILURE TYPE (HCC): ICD-10-CM

## 2024-12-06 RX ORDER — ATORVASTATIN CALCIUM 40 MG/1
40 TABLET, FILM COATED ORAL NIGHTLY
Qty: 90 TABLET | Refills: 3 | Status: SHIPPED | OUTPATIENT
Start: 2024-12-06

## 2024-12-06 RX ORDER — SACUBITRIL AND VALSARTAN 24; 26 MG/1; MG/1
1 TABLET, FILM COATED ORAL 2 TIMES DAILY
Qty: 180 TABLET | Refills: 3 | Status: SHIPPED | OUTPATIENT
Start: 2024-12-06

## 2024-12-06 RX ORDER — PENTOXIFYLLINE 400 MG/1
400 TABLET, EXTENDED RELEASE ORAL
Qty: 270 TABLET | Refills: 3 | Status: SHIPPED | OUTPATIENT
Start: 2024-12-06

## 2024-12-06 RX ORDER — CLOPIDOGREL BISULFATE 75 MG/1
75 TABLET ORAL DAILY
Qty: 90 TABLET | Refills: 3 | Status: SHIPPED | OUTPATIENT
Start: 2024-12-06

## 2024-12-06 RX ORDER — FUROSEMIDE 20 MG/1
20 TABLET ORAL DAILY
Qty: 90 TABLET | Refills: 3 | Status: SHIPPED | OUTPATIENT
Start: 2024-12-06

## 2025-01-22 DIAGNOSIS — N52.9 ERECTILE DYSFUNCTION, UNSPECIFIED ERECTILE DYSFUNCTION TYPE: ICD-10-CM

## 2025-01-23 RX ORDER — TADALAFIL 20 MG/1
20 TABLET ORAL PRN
Qty: 6 TABLET | Refills: 5 | Status: SHIPPED | OUTPATIENT
Start: 2025-01-23

## 2025-02-19 DIAGNOSIS — I47.29 NSVT (NONSUSTAINED VENTRICULAR TACHYCARDIA) (HCC): ICD-10-CM

## 2025-02-19 DIAGNOSIS — I44.2 CHB (COMPLETE HEART BLOCK) (HCC): ICD-10-CM

## 2025-02-19 DIAGNOSIS — Z95.0 PACEMAKER: Primary | ICD-10-CM

## 2025-02-22 DIAGNOSIS — M10.9 ACUTE GOUT OF LEFT WRIST, UNSPECIFIED CAUSE: ICD-10-CM

## 2025-02-22 DIAGNOSIS — I73.9 PERIPHERAL VASCULAR DISEASE: ICD-10-CM

## 2025-02-22 DIAGNOSIS — I50.9 CONGESTIVE HEART FAILURE, UNSPECIFIED HF CHRONICITY, UNSPECIFIED HEART FAILURE TYPE (HCC): ICD-10-CM

## 2025-02-22 DIAGNOSIS — I50.9 HEART FAILURE, UNSPECIFIED (HCC): ICD-10-CM

## 2025-02-22 DIAGNOSIS — Z95.5 HX OF HEART ARTERY STENT: ICD-10-CM

## 2025-02-24 RX ORDER — CLOPIDOGREL BISULFATE 75 MG/1
75 TABLET ORAL DAILY
Qty: 30 TABLET | Refills: 0 | Status: SHIPPED | OUTPATIENT
Start: 2025-02-24

## 2025-02-24 RX ORDER — SACUBITRIL AND VALSARTAN 24; 26 MG/1; MG/1
1 TABLET, FILM COATED ORAL 2 TIMES DAILY
Qty: 60 TABLET | Refills: 0 | Status: SHIPPED | OUTPATIENT
Start: 2025-02-24

## 2025-02-24 RX ORDER — ATORVASTATIN CALCIUM 40 MG/1
40 TABLET, FILM COATED ORAL NIGHTLY
Qty: 30 TABLET | Refills: 0 | Status: SHIPPED | OUTPATIENT
Start: 2025-02-24

## 2025-02-24 RX ORDER — NITROGLYCERIN 0.4 MG/1
TABLET SUBLINGUAL
Qty: 25 TABLET | Refills: 0 | Status: SHIPPED | OUTPATIENT
Start: 2025-02-24

## 2025-02-24 RX ORDER — INDOMETHACIN 50 MG/1
CAPSULE ORAL
Qty: 30 CAPSULE | Refills: 0 | Status: SHIPPED | OUTPATIENT
Start: 2025-02-24

## 2025-03-24 ASSESSMENT — PATIENT HEALTH QUESTIONNAIRE - PHQ9
SUM OF ALL RESPONSES TO PHQ QUESTIONS 1-9: 0
SUM OF ALL RESPONSES TO PHQ QUESTIONS 1-9: 0
2. FEELING DOWN, DEPRESSED OR HOPELESS: NOT AT ALL
SUM OF ALL RESPONSES TO PHQ9 QUESTIONS 1 & 2: 0
2. FEELING DOWN, DEPRESSED OR HOPELESS: NOT AT ALL
1. LITTLE INTEREST OR PLEASURE IN DOING THINGS: NOT AT ALL
SUM OF ALL RESPONSES TO PHQ QUESTIONS 1-9: 0
1. LITTLE INTEREST OR PLEASURE IN DOING THINGS: NOT AT ALL
SUM OF ALL RESPONSES TO PHQ QUESTIONS 1-9: 0

## 2025-03-25 ENCOUNTER — RESULTS FOLLOW-UP (OUTPATIENT)
Age: 75
End: 2025-03-25

## 2025-03-25 ENCOUNTER — OFFICE VISIT (OUTPATIENT)
Age: 75
End: 2025-03-25
Payer: COMMERCIAL

## 2025-03-25 VITALS
SYSTOLIC BLOOD PRESSURE: 132 MMHG | OXYGEN SATURATION: 99 % | TEMPERATURE: 97.4 F | DIASTOLIC BLOOD PRESSURE: 78 MMHG | BODY MASS INDEX: 33.86 KG/M2 | WEIGHT: 250 LBS | HEIGHT: 72 IN | HEART RATE: 77 BPM

## 2025-03-25 DIAGNOSIS — I25.10 CORONARY ARTERY DISEASE INVOLVING NATIVE CORONARY ARTERY OF NATIVE HEART WITHOUT ANGINA PECTORIS: ICD-10-CM

## 2025-03-25 DIAGNOSIS — I50.20 HFREF (HEART FAILURE WITH REDUCED EJECTION FRACTION) (HCC): ICD-10-CM

## 2025-03-25 DIAGNOSIS — M10.9 GOUT, UNSPECIFIED CAUSE, UNSPECIFIED CHRONICITY, UNSPECIFIED SITE: ICD-10-CM

## 2025-03-25 DIAGNOSIS — I73.9 PERIPHERAL VASCULAR DISEASE: ICD-10-CM

## 2025-03-25 DIAGNOSIS — I50.9 CONGESTIVE HEART FAILURE, UNSPECIFIED HF CHRONICITY, UNSPECIFIED HEART FAILURE TYPE (HCC): ICD-10-CM

## 2025-03-25 PROBLEM — S91.109A OPEN TOE WOUND: Status: RESOLVED | Noted: 2019-07-25 | Resolved: 2025-03-25

## 2025-03-25 PROBLEM — I95.2 HYPOTENSION DUE TO DRUGS: Status: RESOLVED | Noted: 2019-07-02 | Resolved: 2025-03-25

## 2025-03-25 PROBLEM — M25.511 ACUTE PAIN OF RIGHT SHOULDER: Status: RESOLVED | Noted: 2019-02-21 | Resolved: 2025-03-25

## 2025-03-25 PROBLEM — I21.4 NSTEMI (NON-ST ELEVATED MYOCARDIAL INFARCTION) (HCC): Status: RESOLVED | Noted: 2019-05-20 | Resolved: 2025-03-25

## 2025-03-25 LAB
ALBUMIN SERPL-MCNC: 4.3 G/DL (ref 3.5–5.2)
ALP SERPL-CCNC: 68 U/L (ref 40–129)
ALT SERPL-CCNC: 23 U/L (ref 10–50)
ANION GAP SERPL CALCULATED.3IONS-SCNC: 11 MMOL/L (ref 8–16)
AST SERPL-CCNC: 20 U/L (ref 10–50)
BILIRUB SERPL-MCNC: 1.2 MG/DL (ref 0.2–1.2)
BILIRUB UR QL STRIP: NEGATIVE
BUN SERPL-MCNC: 22 MG/DL (ref 8–23)
CALCIUM SERPL-MCNC: 10.1 MG/DL (ref 8.8–10.2)
CHLORIDE SERPL-SCNC: 105 MMOL/L (ref 98–107)
CHOLEST SERPL-MCNC: 112 MG/DL (ref 0–199)
CLARITY UR: CLEAR
CO2 SERPL-SCNC: 24 MMOL/L (ref 22–29)
COLOR UR: YELLOW
CREAT SERPL-MCNC: 1.2 MG/DL (ref 0.7–1.2)
ERYTHROCYTE [DISTWIDTH] IN BLOOD BY AUTOMATED COUNT: 13 % (ref 11.5–14.5)
GLUCOSE SERPL-MCNC: 100 MG/DL (ref 70–99)
GLUCOSE UR STRIP.AUTO-MCNC: NEGATIVE MG/DL
HCT VFR BLD AUTO: 42.6 % (ref 42–52)
HDLC SERPL-MCNC: 41 MG/DL (ref 40–60)
HGB BLD-MCNC: 14.1 G/DL (ref 14–18)
HGB UR STRIP.AUTO-MCNC: NEGATIVE MG/L
KETONES UR STRIP.AUTO-MCNC: NEGATIVE MG/DL
LDLC SERPL CALC-MCNC: 45 MG/DL
LEUKOCYTE ESTERASE UR QL STRIP.AUTO: NEGATIVE
MCH RBC QN AUTO: 32.9 PG (ref 27–31)
MCHC RBC AUTO-ENTMCNC: 33.1 G/DL (ref 33–37)
MCV RBC AUTO: 99.5 FL (ref 80–94)
NITRITE UR QL STRIP.AUTO: NEGATIVE
PH UR STRIP.AUTO: 5 [PH] (ref 5–8)
PLATELET # BLD AUTO: 141 K/UL (ref 130–400)
PMV BLD AUTO: 10.8 FL (ref 9.4–12.4)
POTASSIUM SERPL-SCNC: 5 MMOL/L (ref 3.5–5.1)
PROT SERPL-MCNC: 7 G/DL (ref 6.4–8.3)
PROT UR STRIP.AUTO-MCNC: NEGATIVE MG/DL
RBC # BLD AUTO: 4.28 M/UL (ref 4.7–6.1)
SODIUM SERPL-SCNC: 140 MMOL/L (ref 136–145)
SP GR UR STRIP.AUTO: 1.01 (ref 1–1.03)
TRIGL SERPL-MCNC: 129 MG/DL (ref 0–149)
TSH SERPL DL<=0.005 MIU/L-ACNC: 2.36 UIU/ML (ref 0.27–4.2)
UROBILINOGEN UR STRIP.AUTO-MCNC: 0.2 E.U./DL
WBC # BLD AUTO: 5.4 K/UL (ref 4.8–10.8)

## 2025-03-25 PROCEDURE — 1124F ACP DISCUSS-NO DSCNMKR DOCD: CPT | Performed by: FAMILY MEDICINE

## 2025-03-25 PROCEDURE — 99214 OFFICE O/P EST MOD 30 MIN: CPT | Performed by: FAMILY MEDICINE

## 2025-03-25 RX ORDER — INDOMETHACIN 50 MG/1
CAPSULE ORAL
Qty: 30 CAPSULE | Refills: 3 | Status: SHIPPED | OUTPATIENT
Start: 2025-03-25

## 2025-03-25 RX ORDER — ATORVASTATIN CALCIUM 40 MG/1
40 TABLET, FILM COATED ORAL NIGHTLY
Qty: 90 TABLET | Refills: 3 | Status: SHIPPED | OUTPATIENT
Start: 2025-03-25

## 2025-03-25 RX ORDER — CLOPIDOGREL BISULFATE 75 MG/1
75 TABLET ORAL DAILY
Qty: 90 TABLET | Refills: 3 | Status: SHIPPED | OUTPATIENT
Start: 2025-03-25

## 2025-03-25 RX ORDER — SACUBITRIL AND VALSARTAN 24; 26 MG/1; MG/1
1 TABLET, FILM COATED ORAL 2 TIMES DAILY
Qty: 60 TABLET | Refills: 3 | Status: SHIPPED | OUTPATIENT
Start: 2025-03-25

## 2025-03-25 RX ORDER — NITROGLYCERIN 0.4 MG/1
TABLET SUBLINGUAL
Qty: 25 TABLET | Refills: 0 | Status: SHIPPED | OUTPATIENT
Start: 2025-03-25

## 2025-03-25 SDOH — ECONOMIC STABILITY: FOOD INSECURITY: WITHIN THE PAST 12 MONTHS, YOU WORRIED THAT YOUR FOOD WOULD RUN OUT BEFORE YOU GOT MONEY TO BUY MORE.: NEVER TRUE

## 2025-03-25 SDOH — ECONOMIC STABILITY: FOOD INSECURITY: WITHIN THE PAST 12 MONTHS, THE FOOD YOU BOUGHT JUST DIDN'T LAST AND YOU DIDN'T HAVE MONEY TO GET MORE.: NEVER TRUE

## 2025-03-25 ASSESSMENT — ENCOUNTER SYMPTOMS
GASTROINTESTINAL NEGATIVE: 1
RESPIRATORY NEGATIVE: 1
ALLERGIC/IMMUNOLOGIC NEGATIVE: 1
EYES NEGATIVE: 1

## 2025-03-25 NOTE — PROGRESS NOTES
SUBJECTIVE:    Patient ID: Renzo Dejesus III is a 74 y.o.male.    HPI:   Patient here for follow-up of multiple medical problems  Patient is a 74-year-old male.  He have history of heart disease and heart failure with reduced ejection fraction.  He takes Entresto.  He is also seeing cardiology.  He is compliant with therapy.  Denies any chest pains.  He monitor his weight.  No orthopnea.  He is able to continue to work as a .  Would like to have a prescription for nitroglycerin just in case.  He is due for blood work.  Takes high potency statin therapy.  He also have history of peripheral vascular disease.  He is on Plavix.  Denies any leg pain with ambulation.  No open sores.  He also have history of gout.  Takes indomethacin only as needed.  Medication have been helpful.       Past Medical History:   Diagnosis Date   • AAA (abdominal aortic aneurysm)    • CAD (coronary artery disease)     CARDIAC STENTS X 4   • CHF (congestive heart failure) ([facility])    • Gout    • History of acute congestive heart failure    • Hyperlipidemia    • Hypertension    • Neuropathy involving both lower extremities       Current Outpatient Medications   Medication Sig Dispense Refill   • atorvastatin (LIPITOR) 40 MG tablet Take 1 tablet by mouth nightly 90 tablet 3   • clopidogrel (PLAVIX) 75 MG tablet Take 1 tablet by mouth daily 90 tablet 3   • indomethacin (INDOCIN) 50 MG capsule TAKE 1 CAPSULE BY MOUTH 3 TIMES A DAY FOR 2 DAYS THEN 1 TWICE A DAY FOR 2 DAYS THEN 1 DAILYTAKE 1 CAPSULE BY MOUTH 3 TIMES A DAY FOR 2 DAYS THEN 1 TWICE A DAY FOR 2 DAYS THEN 1 DAILY 30 capsule 3   • nitroGLYCERIN (NITROSTAT) 0.4 MG SL tablet up to max of 3 total doses. If no relief after 1 dose, call 911.up to max of 3 total doses. If no relief after 1 dose, call 911. 25 tablet 0   • sacubitril-valsartan (ENTRESTO) 24-26 MG per tablet Take 1 tablet by mouth 2 times daily 60 tablet 3   • tadalafil (CIALIS) 20 MG tablet Take 1 tablet by mouth as needed

## 2025-03-31 ENCOUNTER — PATIENT MESSAGE (OUTPATIENT)
Age: 75
End: 2025-03-31

## 2025-03-31 DIAGNOSIS — K04.7 INFECTED TOOTH: Primary | ICD-10-CM

## 2025-03-31 RX ORDER — CLINDAMYCIN HYDROCHLORIDE 300 MG/1
300 CAPSULE ORAL 3 TIMES DAILY
Qty: 30 CAPSULE | Refills: 0 | Status: SHIPPED | OUTPATIENT
Start: 2025-03-31 | End: 2025-04-10

## 2025-03-31 NOTE — TELEPHONE ENCOUNTER
Why he will antibiotics.  Does he have an infected tooth.  If so clindamycin 300 3 times daily for 10 days

## 2025-04-11 DIAGNOSIS — I50.20 HFREF (HEART FAILURE WITH REDUCED EJECTION FRACTION) (HCC): ICD-10-CM

## 2025-04-11 DIAGNOSIS — I73.9 PERIPHERAL VASCULAR DISEASE: ICD-10-CM

## 2025-04-11 RX ORDER — ATORVASTATIN CALCIUM 40 MG/1
40 TABLET, FILM COATED ORAL NIGHTLY
Qty: 90 TABLET | Refills: 3 | Status: SHIPPED | OUTPATIENT
Start: 2025-04-11

## 2025-05-21 ENCOUNTER — RESULTS FOLLOW-UP (OUTPATIENT)
Dept: CARDIOLOGY CLINIC | Age: 75
End: 2025-05-21

## 2025-05-21 DIAGNOSIS — I44.2 CHB (COMPLETE HEART BLOCK) (HCC): ICD-10-CM

## 2025-05-21 DIAGNOSIS — Z95.0 PACEMAKER: Primary | ICD-10-CM

## 2025-06-03 DIAGNOSIS — I50.20 HFREF (HEART FAILURE WITH REDUCED EJECTION FRACTION) (HCC): ICD-10-CM

## 2025-06-03 RX ORDER — SACUBITRIL AND VALSARTAN 24; 26 MG/1; MG/1
1 TABLET, FILM COATED ORAL 2 TIMES DAILY
Qty: 60 TABLET | Refills: 3 | Status: SHIPPED | OUTPATIENT
Start: 2025-06-03

## 2025-07-17 DIAGNOSIS — I73.9 PERIPHERAL VASCULAR DISEASE: ICD-10-CM

## 2025-07-17 DIAGNOSIS — I50.9 CONGESTIVE HEART FAILURE, UNSPECIFIED HF CHRONICITY, UNSPECIFIED HEART FAILURE TYPE (HCC): ICD-10-CM

## 2025-07-17 DIAGNOSIS — I50.20 HFREF (HEART FAILURE WITH REDUCED EJECTION FRACTION) (HCC): ICD-10-CM

## 2025-07-17 RX ORDER — CLOPIDOGREL BISULFATE 75 MG/1
75 TABLET ORAL DAILY
Qty: 90 TABLET | Refills: 3 | Status: SHIPPED | OUTPATIENT
Start: 2025-07-17

## 2025-07-17 RX ORDER — PENTOXIFYLLINE 400 MG/1
400 TABLET, EXTENDED RELEASE ORAL
Qty: 270 TABLET | Refills: 3 | Status: SHIPPED | OUTPATIENT
Start: 2025-07-17

## 2025-07-18 RX ORDER — FUROSEMIDE 20 MG/1
20 TABLET ORAL DAILY
Qty: 90 TABLET | Refills: 3 | Status: SHIPPED | OUTPATIENT
Start: 2025-07-18

## 2025-08-25 DIAGNOSIS — Z95.0 PACEMAKER: Primary | ICD-10-CM

## 2025-08-25 DIAGNOSIS — I44.2 CHB (COMPLETE HEART BLOCK) (HCC): ICD-10-CM

## (undated) DEVICE — Z DISCONTINUED USE 2272117 DRAPE SURG 3 QTR N INVASIVE 2 LAYR DISP

## (undated) DEVICE — ROYAL SILK SURGICAL GOWN, XXL: Brand: CONVERTORS

## (undated) DEVICE — GAUZE,SPONGE,FLUFF,6"X6.75",STRL,10/TRAY: Brand: MEDLINE

## (undated) DEVICE — SOLUTION IV IRRIG POUR BRL 0.9% SODIUM CHL 2F7124

## (undated) DEVICE — MINOR CDS: Brand: MEDLINE INDUSTRIES, INC.

## (undated) DEVICE — DRESSING PETRO W3XL3IN OIL EMUL N ADH GZ KNIT IMPREG CELOS

## (undated) DEVICE — SUTURE VCRL SZ 3-0 L18IN ABSRB UD L26MM SH 1/2 CIR J864D

## (undated) DEVICE — BANDAGE,GAUZE,4.5"X4.1YD,STERILE,LF: Brand: MEDLINE

## (undated) DEVICE — BANDAGE COMPR W4INXL15FT BGE E SGL LAYERED CLP CLSR

## (undated) DEVICE — TOWEL,OR,DSP,ST,BLUE,DLX,4/PK,20PK/CS: Brand: MEDLINE

## (undated) DEVICE — SUTURE ETHLN SZ 3-0 L18IN NONABSORBABLE BLK FS-1 L24MM 3/8 663H

## (undated) DEVICE — GLOVE SURG SZ 7 CRM LTX FREE POLYISOPRENE POLYMER BEAD ANTI

## (undated) DEVICE — SUTURE NONABSORBABLE MONOFILAMENT 4-0 RB-1 36 IN BLU PROLENE 8557H